# Patient Record
Sex: FEMALE | Race: BLACK OR AFRICAN AMERICAN | NOT HISPANIC OR LATINO | Employment: FULL TIME | URBAN - METROPOLITAN AREA
[De-identification: names, ages, dates, MRNs, and addresses within clinical notes are randomized per-mention and may not be internally consistent; named-entity substitution may affect disease eponyms.]

---

## 2019-10-15 ENCOUNTER — HOSPITAL ENCOUNTER (EMERGENCY)
Facility: HOSPITAL | Age: 54
Discharge: HOME | End: 2019-10-15
Attending: EMERGENCY MEDICINE
Payer: COMMERCIAL

## 2019-10-15 VITALS
TEMPERATURE: 98 F | HEART RATE: 98 BPM | SYSTOLIC BLOOD PRESSURE: 154 MMHG | WEIGHT: 143 LBS | HEIGHT: 63 IN | OXYGEN SATURATION: 98 % | DIASTOLIC BLOOD PRESSURE: 99 MMHG | BODY MASS INDEX: 25.34 KG/M2 | RESPIRATION RATE: 18 BRPM
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
## 2019-10-15 DIAGNOSIS — M25.512 ACUTE PAIN OF LEFT SHOULDER: ICD-10-CM

## 2019-10-15 LAB
B-HCG UR QL: NEGATIVE
BILIRUB UR QL STRIP: NEGATIVE
CLARITY UR: CLEAR
COLOR UR: YELLOW
CTP QC/QA: YES
GLUCOSE UR QL STRIP: NEGATIVE
HGB UR QL STRIP: ABNORMAL
KETONES UR QL STRIP: NEGATIVE
LEUKOCYTE ESTERASE UR QL STRIP: NEGATIVE
NITRITE UR QL STRIP: NEGATIVE
PH UR STRIP: 6 [PH] (ref 5–8)
PROT UR QL STRIP: NEGATIVE
SP GR UR STRIP: <=1.005 (ref 1–1.03)
URN SPEC COLLECT METH UR: ABNORMAL
UROBILINOGEN UR STRIP-ACNC: NEGATIVE EU/DL

## 2019-10-15 PROCEDURE — 25000003 PHARM REV CODE 250: Performed by: EMERGENCY MEDICINE

## 2019-10-15 PROCEDURE — 81003 URINALYSIS AUTO W/O SCOPE: CPT

## 2019-10-15 PROCEDURE — 81025 URINE PREGNANCY TEST: CPT | Performed by: EMERGENCY MEDICINE

## 2019-10-15 PROCEDURE — 99284 EMERGENCY DEPT VISIT MOD MDM: CPT | Mod: 25

## 2019-10-15 RX ORDER — ACETAMINOPHEN 325 MG/1
650 TABLET ORAL
Status: COMPLETED | OUTPATIENT
Start: 2019-10-15 | End: 2019-10-15

## 2019-10-15 RX ORDER — TRAMADOL HYDROCHLORIDE 50 MG/1
50 TABLET ORAL EVERY 8 HOURS PRN
Qty: 12 TABLET | Refills: 0 | Status: SHIPPED | OUTPATIENT
Start: 2019-10-15 | End: 2020-06-15

## 2019-10-15 RX ORDER — CYCLOBENZAPRINE HCL 10 MG
10 TABLET ORAL EVERY 8 HOURS PRN
Qty: 14 TABLET | Refills: 0 | Status: SHIPPED | OUTPATIENT
Start: 2019-10-15 | End: 2019-10-20

## 2019-10-15 RX ORDER — PROCHLORPERAZINE MALEATE 10 MG
10 TABLET ORAL EVERY 8 HOURS PRN
Qty: 14 TABLET | Refills: 0 | Status: SHIPPED | OUTPATIENT
Start: 2019-10-15 | End: 2020-06-15

## 2019-10-15 RX ADMIN — ACETAMINOPHEN 650 MG: 325 TABLET ORAL at 10:10
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
## 2019-10-16 NOTE — ED NOTES
Pt educated on discharge instructions, follow up care, and medications. Pt verbalized understanding.
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
## 2020-01-02 ENCOUNTER — HOSPITAL ENCOUNTER (EMERGENCY)
Facility: HOSPITAL | Age: 55
Discharge: HOME | End: 2020-01-02
Attending: EMERGENCY MEDICINE
Payer: COMMERCIAL

## 2020-01-02 VITALS
HEIGHT: 63 IN | DIASTOLIC BLOOD PRESSURE: 88 MMHG | WEIGHT: 153 LBS | TEMPERATURE: 98 F | OXYGEN SATURATION: 98 % | BODY MASS INDEX: 27.11 KG/M2 | SYSTOLIC BLOOD PRESSURE: 132 MMHG | HEART RATE: 82 BPM | RESPIRATION RATE: 20 BRPM

## 2020-01-02 DIAGNOSIS — R00.2 PALPITATIONS: ICD-10-CM

## 2020-01-02 DIAGNOSIS — G43.909 MIGRAINE WITHOUT STATUS MIGRAINOSUS, NOT INTRACTABLE, UNSPECIFIED MIGRAINE TYPE: Primary | ICD-10-CM

## 2020-01-02 LAB
ALBUMIN SERPL BCP-MCNC: 3.8 G/DL (ref 3.5–5.2)
ALP SERPL-CCNC: 47 U/L (ref 55–135)
ALT SERPL W/O P-5'-P-CCNC: 22 U/L (ref 10–44)
ANION GAP SERPL CALC-SCNC: 13 MMOL/L (ref 8–16)
AST SERPL-CCNC: 22 U/L (ref 10–40)
B-HCG UR QL: NEGATIVE
BASOPHILS # BLD AUTO: 0.03 K/UL (ref 0–0.2)
BASOPHILS NFR BLD: 0.4 % (ref 0–1.9)
BILIRUB SERPL-MCNC: 0.4 MG/DL (ref 0.1–1)
BUN SERPL-MCNC: 5 MG/DL (ref 6–20)
CALCIUM SERPL-MCNC: 9.8 MG/DL (ref 8.7–10.5)
CHLORIDE SERPL-SCNC: 101 MMOL/L (ref 95–110)
CO2 SERPL-SCNC: 24 MMOL/L (ref 23–29)
CREAT SERPL-MCNC: 0.7 MG/DL (ref 0.5–1.4)
CTP QC/QA: YES
DIFFERENTIAL METHOD: NORMAL
EOSINOPHIL # BLD AUTO: 0.1 K/UL (ref 0–0.5)
EOSINOPHIL NFR BLD: 1.5 % (ref 0–8)
ERYTHROCYTE [DISTWIDTH] IN BLOOD BY AUTOMATED COUNT: 13.5 % (ref 11.5–14.5)
EST. GFR  (AFRICAN AMERICAN): >60 ML/MIN/1.73 M^2
EST. GFR  (NON AFRICAN AMERICAN): >60 ML/MIN/1.73 M^2
GLUCOSE SERPL-MCNC: 99 MG/DL (ref 70–110)
HCT VFR BLD AUTO: 41.6 % (ref 37–48.5)
HGB BLD-MCNC: 14.2 G/DL (ref 12–16)
LYMPHOCYTES # BLD AUTO: 2.5 K/UL (ref 1–4.8)
LYMPHOCYTES NFR BLD: 31.2 % (ref 18–48)
MAGNESIUM SERPL-MCNC: 2.1 MG/DL (ref 1.6–2.6)
MCH RBC QN AUTO: 29.4 PG (ref 27–31)
MCHC RBC AUTO-ENTMCNC: 34.1 G/DL (ref 32–36)
MCV RBC AUTO: 86 FL (ref 82–98)
MONOCYTES # BLD AUTO: 0.5 K/UL (ref 0.3–1)
MONOCYTES NFR BLD: 5.7 % (ref 4–15)
NEUTROPHILS # BLD AUTO: 4.8 K/UL (ref 1.8–7.7)
NEUTROPHILS NFR BLD: 61.2 % (ref 38–73)
PLATELET # BLD AUTO: 314 K/UL (ref 150–350)
PMV BLD AUTO: 9.5 FL (ref 9.2–12.9)
POTASSIUM SERPL-SCNC: 3.8 MMOL/L (ref 3.5–5.1)
PROT SERPL-MCNC: 7.9 G/DL (ref 6–8.4)
RBC # BLD AUTO: 4.83 M/UL (ref 4–5.4)
SODIUM SERPL-SCNC: 138 MMOL/L (ref 136–145)
T4 FREE SERPL-MCNC: 0.94 NG/DL (ref 0.71–1.51)
TROPONIN I SERPL DL<=0.01 NG/ML-MCNC: <0.006 NG/ML (ref 0–0.03)
TSH SERPL DL<=0.005 MIU/L-ACNC: 6.73 UIU/ML (ref 0.4–4)
WBC # BLD AUTO: 7.91 K/UL (ref 3.9–12.7)

## 2020-01-02 PROCEDURE — 83735 ASSAY OF MAGNESIUM: CPT

## 2020-01-02 PROCEDURE — 81025 URINE PREGNANCY TEST: CPT | Performed by: EMERGENCY MEDICINE

## 2020-01-02 PROCEDURE — 96375 TX/PRO/DX INJ NEW DRUG ADDON: CPT

## 2020-01-02 PROCEDURE — 84439 ASSAY OF FREE THYROXINE: CPT

## 2020-01-02 PROCEDURE — 63600175 PHARM REV CODE 636 W HCPCS: Performed by: EMERGENCY MEDICINE

## 2020-01-02 PROCEDURE — 93005 ELECTROCARDIOGRAM TRACING: CPT

## 2020-01-02 PROCEDURE — 80053 COMPREHEN METABOLIC PANEL: CPT

## 2020-01-02 PROCEDURE — 96361 HYDRATE IV INFUSION ADD-ON: CPT

## 2020-01-02 PROCEDURE — 93010 ELECTROCARDIOGRAM REPORT: CPT | Mod: ,,, | Performed by: INTERNAL MEDICINE

## 2020-01-02 PROCEDURE — 99285 EMERGENCY DEPT VISIT HI MDM: CPT | Mod: 25

## 2020-01-02 PROCEDURE — 84443 ASSAY THYROID STIM HORMONE: CPT

## 2020-01-02 PROCEDURE — 93010 EKG 12-LEAD: ICD-10-PCS | Mod: ,,, | Performed by: INTERNAL MEDICINE

## 2020-01-02 PROCEDURE — 85025 COMPLETE CBC W/AUTO DIFF WBC: CPT

## 2020-01-02 PROCEDURE — 84484 ASSAY OF TROPONIN QUANT: CPT

## 2020-01-02 PROCEDURE — 96374 THER/PROPH/DIAG INJ IV PUSH: CPT

## 2020-01-02 RX ORDER — METOCLOPRAMIDE 10 MG/1
10 TABLET ORAL EVERY 6 HOURS PRN
Qty: 12 TABLET | Refills: 0 | Status: SHIPPED | OUTPATIENT
Start: 2020-01-02 | End: 2020-08-06

## 2020-01-02 RX ORDER — METOCLOPRAMIDE HYDROCHLORIDE 5 MG/ML
10 INJECTION INTRAMUSCULAR; INTRAVENOUS
Status: COMPLETED | OUTPATIENT
Start: 2020-01-02 | End: 2020-01-02

## 2020-01-02 RX ORDER — KETOROLAC TROMETHAMINE 30 MG/ML
15 INJECTION, SOLUTION INTRAMUSCULAR; INTRAVENOUS
Status: COMPLETED | OUTPATIENT
Start: 2020-01-02 | End: 2020-01-02

## 2020-01-02 RX ORDER — LEVOTHYROXINE SODIUM 125 UG/1
CAPSULE ORAL
COMMUNITY
End: 2020-08-06

## 2020-01-02 RX ORDER — MEDROXYPROGESTERONE ACETATE 10 MG/1
10 TABLET ORAL DAILY
COMMUNITY
End: 2020-08-06

## 2020-01-02 RX ORDER — MELOXICAM 7.5 MG/1
7.5 TABLET ORAL DAILY PRN
Qty: 5 TABLET | Refills: 0 | Status: SHIPPED | OUTPATIENT
Start: 2020-01-02 | End: 2020-05-14

## 2020-01-02 RX ADMIN — SODIUM CHLORIDE, SODIUM LACTATE, POTASSIUM CHLORIDE, AND CALCIUM CHLORIDE 1000 ML: .6; .31; .03; .02 INJECTION, SOLUTION INTRAVENOUS at 06:01

## 2020-01-02 RX ADMIN — METOCLOPRAMIDE 10 MG: 5 INJECTION, SOLUTION INTRAMUSCULAR; INTRAVENOUS at 06:01

## 2020-01-02 RX ADMIN — KETOROLAC TROMETHAMINE 15 MG: 30 INJECTION, SOLUTION INTRAMUSCULAR at 06:01

## 2020-01-02 NOTE — ED PROVIDER NOTES
Encounter Date: 1/2/2020    SCRIBE #1 NOTE: I, Clara Frank, am scribing for, and in the presence of,  Dr. Lyons. I have scribed the entire note.       History     Chief Complaint   Patient presents with    Palpitations     Chest palpitations that started yesterday at work around 1 pm. Palpitations have been intermittent. States she has been hospitalized in past for same symptoms.     Migraine     Migraine that start on Tuesday. Has been taking tylenol for pain. Hx of migraines with menstural cycle.      Mary Alice Garcia is a 54 y.o. female who  has a past medical history of Arthritis, Depression, Thyroid disease, and Tuberculosis.    The patient presents to the ED due to palpitations. She reports onset of symptoms was 1 day ago. The patient had a an episode of heart racing yesterday as well as this morning. This morning she was lying in bed when the symptoms began. The symptoms have been intermittent since onset. She denies any associated chest pain, leg swelling, dizziness, loss of consciousness leg swelling/pain or sweating. The patient has been evaluated in the past for palpitations years ago. However, the patient does not recall her diagnosis nor was she started on medications for arrhythmia.  In addition the patient complains of gradually worsening headache. She reports onset of symptoms was 2 day days ago. The pain is located on the left side of the head. She has associated nausea, blurred vision and light sensitivity but denies any vomiting or neck pain. The patient reports taking Tylenol for pain with minimal improvement. She typically has a Migraine when on her menstrual cycle. Pain is located to L side of head and associated with photophobia, similar to previous migraines. The patient admits she has been having vaginal bleeding since Dec. 4th. With occasional blood clots. The bleeding has improved after starting on Provera. Of note the patient is s/p thyroidectomy    The history is provided by the  patient.     Review of patient's allergies indicates:   Allergen Reactions    Paxil [paroxetine hcl] Rash     Past Medical History:   Diagnosis Date    Arthritis     Depression     Thyroid disease     Tuberculosis      Past Surgical History:   Procedure Laterality Date     SECTION      HEMORRHOID SURGERY      KNEE SURGERY      right knee    thyroid removed      TONSILLECTOMY      TUBAL LIGATION       Family History   Problem Relation Age of Onset    Cancer Mother         breast cancer    Hyperlipidemia Father     Hypertension Brother      Social History     Tobacco Use    Smoking status: Never Smoker   Substance Use Topics    Alcohol use: Yes     Alcohol/week: 3.0 standard drinks     Types: 3 Glasses of wine per week    Drug use: No     Review of Systems   Constitutional: Negative for chills and fever.   HENT: Negative for sore throat.    Respiratory: Negative for cough and shortness of breath.    Cardiovascular: Positive for palpitations. Negative for chest pain.   Gastrointestinal: Negative for nausea and vomiting.   Genitourinary: Negative for dysuria, frequency and urgency.   Musculoskeletal: Negative for back pain, neck pain and neck stiffness.   Skin: Negative for rash and wound.   Neurological: Positive for headaches. Negative for syncope and weakness.   Hematological: Does not bruise/bleed easily.   Psychiatric/Behavioral: Negative for agitation, behavioral problems and confusion.       Physical Exam     Initial Vitals [20 0540]   BP Pulse Resp Temp SpO2   (!) 160/82 89 18 98.1 °F (36.7 °C) 100 %      MAP       --         Physical Exam    Nursing note and vitals reviewed.  Constitutional: She appears well-developed and well-nourished. She is not diaphoretic. No distress.   HENT:   Head: Normocephalic and atraumatic.   Mouth/Throat: Oropharynx is clear and moist.   Eyes: Conjunctivae and EOM are normal. Pupils are equal, round, and reactive to light.   No visual field deficits    Neck: Normal range of motion. Neck supple.   No neck pain or stiffness; FROM   Cardiovascular: Normal rate, regular rhythm and normal heart sounds. Exam reveals no gallop and no friction rub.    No murmur heard.  Pulmonary/Chest: Breath sounds normal. She has no wheezes. She has no rhonchi. She has no rales.   Musculoskeletal: Normal range of motion. She exhibits no edema.   Lymphadenopathy:     She has no cervical adenopathy.   Neurological: She is alert and oriented to person, place, and time. She has normal strength.   CN 2-12 intact  Finger to nose within normal limits  Negative romberg  Gait normal  Equal strength to bilateral upper extremities, SILT  Equal strength to bilateral lower extremities, SILT   Skin: Skin is warm and dry. No rash noted.         ED Course   Procedures  Labs Reviewed   COMPREHENSIVE METABOLIC PANEL - Abnormal; Notable for the following components:       Result Value    BUN, Bld 5 (*)     Alkaline Phosphatase 47 (*)     All other components within normal limits   TSH - Abnormal; Notable for the following components:    TSH 6.734 (*)     All other components within normal limits   CBC W/ AUTO DIFFERENTIAL   TROPONIN I   MAGNESIUM   T4, FREE   POCT URINE PREGNANCY          Imaging Results          X-Ray Chest AP Portable (Final result)  Result time 01/02/20 07:00:20    Final result by Raquel Mena MD (01/02/20 07:00:20)                 Impression:      No acute intrathoracic abnormality identified on this single radiographic view of the chest.      Electronically signed by: Raquel Mena MD  Date:    01/02/2020  Time:    07:00             Narrative:    EXAMINATION:  XR CHEST AP PORTABLE    CLINICAL HISTORY:  palpitations;    TECHNIQUE:  Single frontal view of the chest was performed.    COMPARISON:  02/09/2015    FINDINGS:  Cardiac monitoring leads overlie the chest.  The visualized airway is unremarkable.  The lungs appear symmetrically aerated without definite focal alveolar  consolidation. No large pleural effusion or pneumothorax is appreciated.Visualized osseous structures are intact.                                 Medical Decision Making:   Differential Diagnosis:   Differential Diagnosis includes, but is not limited to:  Ischemic stroke, hemorrhagic stroke, subarachnoid hemorrhage/ruptured aneurysm, intracranial lesion/mass, meningitis/encephalitis, epidural hematoma, subdural hematoma, pseudotumor cerebri, venous sinus thrombosis, CO poisoning, hypertensive encephalopathy, MI/ACS, head trauma/contusion, concussion, sinus headache, dehydration, anxiety, medication non-compliance, primary headache (tension/cluster/migraine).  Arrhythmia, electrolyte abnormality, thyroid disorder, medication effect, ACS    Clinical Tests:   Lab Tests: Ordered and Reviewed  Radiological Study: Ordered and Reviewed  Medical Tests: Ordered and Reviewed  ED Management:  Patient's labs without significant abnormality.    EKG sinus rhythm.  Patient was observed on cardiac monitoring without evidence of dysrhythmia.    Will refer to cardiology.    Headache improved after headache cocktail.  Will discharge with Mobic and Reglan.    Return precautions were given.  Patient felt better on reassessment and is comfortable with discharge.    After taking into careful account the historical factors and physical exam findings of the patient's presentation today, in conjunction with the empirical and objective data obtained on ED workup, no acute emergent medical condition has been identified. The patient appears to be low risk for an emergent medical condition and I feel it is safe and appropriate at this time for the patient to be discharged to follow-up as detailed in their discharge instructions for reevaluation and possible continued outpatient workup and management. I have discussed the specifics of the workup with the patient and the patient has verbalized understanding of the details of the workup, the  diagnosis, the treatment plan, and the need for outpatient follow-up.  Although the patient has no emergent etiology today this does not preclude the development of an emergent condition so in addition, I have advised the patient that they can return to the ED and/or activate EMS at any time with worsening of their symptoms, change of their symptoms, or with any other medical complaint.  The patient remained comfortable and stable during their visit in the ED.  Discharge and follow-up instructions discussed with the patient who expressed understanding and willingness to comply with my recommendations.                     ED Course as of Jan 02 0819   Thu Jan 02, 2020   0603 ECG: rate 89 Normal sinus rhythm. No STEMI. No Ectopy.     [RN]   0620 Patient presents with palpitations and headache. She reports similar pattern of headache with her menstrual period.  She reports having been admitted many years ago in [place] for palpitations however does not remember her diagnosis or take any anti rhythmic medications.  On exam she has a nonfocal neuro exam her heart beat is regular will obtain labs treat symptoms and will reassess.    [RN]   0658 CBC auto differential [RN]   0658 Troponin I [RN]   0752 Patient's pain is improved.  She feels comfortable going home.  Labs reviewed no significant abnormalities.  Will refer to PCP cardiology palpitations become worse.  Return precautions for dizziness weakness loss of consciousness worsening headache numbness weakness vision changes or any other concerns.    [RN]      ED Course User Index  [RN] Deng Lyons Jr., MD                Clinical Impression:     1. Migraine without status migrainosus, not intractable, unspecified migraine type    2. Palpitations        Disposition:   Disposition: Discharged  Condition: Stable    Scribe attestation I, Deng Lyons,  personally performed the services described in this documentation. All medical record entries made by the scribe were  at my direction and in my presence.  I have reviewed the chart and agree that the record reflects my personal performance and is accurate and complete. Deng Lyons M.D. 8:23 AM01/02/2020    Portions of this note were dictated using voice recognition software and may contain dictation related errors in spelling/grammar/syntax not found on text review                   Deng Lyons Jr., MD  01/02/20 0811

## 2020-01-02 NOTE — PROVIDER PROGRESS NOTES - EMERGENCY DEPT.
Encounter Date: 1/2/2020    ED Physician Progress Notes         EKG - STEMI Decision  Initial Reading: No STEMI present.  Response: No Action Needed.     EKG:  Normal sinus rhythm at 89 bpm, nl axis, nl intervals, no hypertrophy, no ST-T changes as read by me (Dr. Cordoba).  No previous EKG for comparison impression normal.

## 2020-01-02 NOTE — ED NOTES
Patient stated HA pain was now an 8/10 from a 10. Fluids complete. Nurse let her know new nurse would be in soon for introduction. Still waiting for xray to result.

## 2020-01-02 NOTE — ED NOTES
Assumed care of this patient. Pt is resting on stretcher with eyes closed. SR up and CB in reach. No distress noted. Will continue to monitor.

## 2020-01-02 NOTE — ED TRIAGE NOTES
Patient presents to the ED with complaints of a left sided migraine that started on Tuesday and chest palpitations that started yesterday at 1 pm while at work. Patient has history of both. States migraines usually occur when she is on her period although she has been bleeding since beginning of December. Migraines always on left side. She has been to GYN and was started on hormone medication to help with bleeding. Bleeding let up on Tuesday but did become heavy again on Wednesday. Has not bled through a pad in less than an hour. She took tylenol last at midnight with minimal relief. Patient teary eyed. States pain 10/10 with photophobia. Chest palpitations occurred before coming to ER. Resolved at this point.     Review of patient's allergies indicates:   Allergen Reactions    Paxil [paroxetine hcl] Rash        Patient has verified the spelling of their name and  on armband.   APPEARANCE: Patient is alert, calm, oriented x 4, and teary eyed. Squinting eyes   SKIN: Skin is normal for race, warm, and dry. Normal skin turgor and mucous membranes moist.  CARDIAC: Normal rate and rhythm, no murmur heard. Denies chest pain or palpitations  RESPIRATORY:Normal rate and effort. Breath sounds clear bilaterally throughout chest. Respirations are equal and unlabored.    GASTRO: Bowel sounds normal, abdomen is soft, no tenderness, and no abdominal distention.  MUSCLE: Full ROM. No bony tenderness or soft tissue tenderness. No obvious deformity.  PERIPHERAL VASCULAR: peripheral pulses present. Normal cap refill. No edema. Warm to touch.  NEURO: 5/5 strength major flexors/extensors bilaterally. Sensory intact to light touch bilaterally. Sarah coma scale: eyes open spontaneously-4, oriented & converses-5, obeys commands-6. No neurological abnormalities.   MENTAL STATUS: awake, alert and aware of environment.  EYE: No overt deficits noted. No drainage. Sclera WNL +photophobia due to migraine  ENT: EARS: no obvious drainage.  NOSE: no active bleeding. THROAT: no redness or swelling.  : Voids without complication +vaginal bleeding which has been present since beginning of Dec. Has not bled through a pad in less than an hour   HEAD: left frontal/parietal lobe HA rated 10/10 on pain scale. Patient states her migraines normally reside on that side

## 2020-02-18 ENCOUNTER — TELEPHONE (OUTPATIENT)
Dept: SURGERY | Facility: CLINIC | Age: 55
End: 2020-02-18

## 2020-02-18 NOTE — TELEPHONE ENCOUNTER
----- Message from Meli Delacruz sent at 2/18/2020  3:52 PM CST -----  Contact: [phone]-self  Patient is requesting a call back concerning pt called yesterday to cancel her appt because the Dr wanted her to at least have six weeks of the medication before she sees the Dr, pt called because the appt was not canceled. Please call    2/18/20  4:09pm  Spoke to patient regarding above message. All questions answered. Patient verbalized understanding.

## 2020-04-07 ENCOUNTER — TELEPHONE (OUTPATIENT)
Dept: ORTHOPEDICS | Facility: CLINIC | Age: 55
End: 2020-04-07

## 2020-04-07 NOTE — TELEPHONE ENCOUNTER
----- Message from Zoila Scanlon sent at 4/7/2020 11:07 AM CDT -----  Contact: patient  Type:  Same Day Appointment Request    Caller is requesting a same day appointment.  Caller declined first available appointment listed below.    Name of Caller: Mary Alice  When is the first available appointment?unknown  Symptoms:Shoulder pain  Best Call Back Number:[phone]  Additional Information: Patient is complaining of shoulder pain and would like to be seen as soon as something is available

## 2020-04-20 ENCOUNTER — TELEPHONE (OUTPATIENT)
Dept: ORTHOPEDICS | Facility: CLINIC | Age: 55
End: 2020-04-20

## 2020-04-20 NOTE — TELEPHONE ENCOUNTER
----- Message from Deng Lutz Jr., MD sent at 4/20/2020  8:56 AM CDT -----  Contact: Patient   Needs appt please  ----- Message -----  From: Roshni Dixon  Sent: 4/13/2020   9:24 AM CDT  To: Deng Lutz Jr., MD    Name of caller: Mary Alice Garcia  Reason for Visit/Symptoms: problem with shoulder  Best contact # or Confirm MyChart preferred: [phone]  Preferred date/time of Appt. Yes time  Interested in Virtual visit? No  Added Info: Left messages for office and hasn't received a call back.

## 2020-05-11 ENCOUNTER — TELEPHONE (OUTPATIENT)
Dept: ORTHOPEDICS | Facility: CLINIC | Age: 55
End: 2020-05-11

## 2020-05-11 NOTE — TELEPHONE ENCOUNTER
----- Message from Joselin Lo sent at 5/11/2020  9:17 AM CDT -----  Contact: Self [phone]  Patient would like to speak with you about not canceling her appointment and needs to be seen today for shoulder pain. Please advise

## 2020-05-13 ENCOUNTER — TELEPHONE (OUTPATIENT)
Dept: ORTHOPEDICS | Facility: CLINIC | Age: 55
End: 2020-05-13

## 2020-05-13 DIAGNOSIS — M25.512 LEFT SHOULDER PAIN, UNSPECIFIED CHRONICITY: Primary | ICD-10-CM

## 2020-05-14 ENCOUNTER — OFFICE VISIT (OUTPATIENT)
Dept: ORTHOPEDICS | Facility: CLINIC | Age: 55
End: 2020-05-14
Payer: COMMERCIAL

## 2020-05-14 ENCOUNTER — HOSPITAL ENCOUNTER (OUTPATIENT)
Dept: RADIOLOGY | Facility: HOSPITAL | Age: 55
Discharge: HOME | End: 2020-05-14
Attending: ORTHOPAEDIC SURGERY
Payer: COMMERCIAL

## 2020-05-14 VITALS — HEIGHT: 63 IN | BODY MASS INDEX: 27.11 KG/M2 | WEIGHT: 153 LBS

## 2020-05-14 DIAGNOSIS — M25.512 LEFT SHOULDER PAIN, UNSPECIFIED CHRONICITY: ICD-10-CM

## 2020-05-14 DIAGNOSIS — S46.012A TRAUMATIC INCOMPLETE TEAR OF LEFT ROTATOR CUFF, INITIAL ENCOUNTER: Primary | ICD-10-CM

## 2020-05-14 PROCEDURE — 73030 X-RAY EXAM OF SHOULDER: CPT | Mod: 26,LT,, | Performed by: RADIOLOGY

## 2020-05-14 PROCEDURE — 99999 PR PBB SHADOW E&M-EST. PATIENT-LVL II: ICD-10-PCS | Mod: PBBFAC,,, | Performed by: ORTHOPAEDIC SURGERY

## 2020-05-14 PROCEDURE — 99203 PR OFFICE/OUTPT VISIT, NEW, LEVL III, 30-44 MIN: ICD-10-PCS | Mod: S$GLB,,, | Performed by: ORTHOPAEDIC SURGERY

## 2020-05-14 PROCEDURE — 99999 PR PBB SHADOW E&M-EST. PATIENT-LVL II: CPT | Mod: PBBFAC,,, | Performed by: ORTHOPAEDIC SURGERY

## 2020-05-14 PROCEDURE — 99203 OFFICE O/P NEW LOW 30 MIN: CPT | Mod: S$GLB,,, | Performed by: ORTHOPAEDIC SURGERY

## 2020-05-14 PROCEDURE — 73030 X-RAY EXAM OF SHOULDER: CPT | Mod: TC,PN,LT

## 2020-05-14 PROCEDURE — 3008F PR BODY MASS INDEX (BMI) DOCUMENTED: ICD-10-PCS | Mod: CPTII,S$GLB,, | Performed by: ORTHOPAEDIC SURGERY

## 2020-05-14 PROCEDURE — 3008F BODY MASS INDEX DOCD: CPT | Mod: CPTII,S$GLB,, | Performed by: ORTHOPAEDIC SURGERY

## 2020-05-14 PROCEDURE — 73030 XR SHOULDER COMPLETE 2 OR MORE VIEWS LEFT: ICD-10-PCS | Mod: 26,LT,, | Performed by: RADIOLOGY

## 2020-05-14 RX ORDER — NABUMETONE 750 MG/1
750 TABLET, FILM COATED ORAL 2 TIMES DAILY WITH MEALS
Qty: 60 TABLET | Refills: 1 | Status: SHIPPED | OUTPATIENT
Start: 2020-05-14 | End: 2020-06-15
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
## 2020-05-22 ENCOUNTER — TELEPHONE (OUTPATIENT)
Dept: ORTHOPEDICS | Facility: CLINIC | Age: 55
End: 2020-05-22

## 2020-05-22 NOTE — TELEPHONE ENCOUNTER
----- Message from Elisabeth Mclean sent at 5/22/2020  2:28 PM CDT -----  Contact: [phone]/self  Patient is requesting a call back regarding the MRI is too expensive. Please call her to discuss changing to a location that her insurance will cover fully. Thanks

## 2020-05-22 NOTE — TELEPHONE ENCOUNTER
----- Message from Shay Torres sent at 5/22/2020 11:41 AM CDT -----  Contact: Patient  Patient called in and wanted to speak with the office regarding her upcoming appointment for her MRI. She would like a call back from the office and can be reached at    [phone]

## 2020-05-29 ENCOUNTER — TELEPHONE (OUTPATIENT)
Dept: ENDOCRINOLOGY | Facility: CLINIC | Age: 55
End: 2020-05-29

## 2020-06-01 ENCOUNTER — OFFICE VISIT (OUTPATIENT)
Dept: ENDOCRINOLOGY | Facility: CLINIC | Age: 55
End: 2020-06-01
Payer: COMMERCIAL

## 2020-06-01 VITALS
SYSTOLIC BLOOD PRESSURE: 130 MMHG | BODY MASS INDEX: 28.12 KG/M2 | DIASTOLIC BLOOD PRESSURE: 70 MMHG | HEIGHT: 63 IN | WEIGHT: 158.69 LBS

## 2020-06-01 DIAGNOSIS — E66.3 OVERWEIGHT (BMI 25.0-29.9): ICD-10-CM

## 2020-06-01 DIAGNOSIS — K21.9 GASTROESOPHAGEAL REFLUX DISEASE, ESOPHAGITIS PRESENCE NOT SPECIFIED: ICD-10-CM

## 2020-06-01 DIAGNOSIS — E03.9 HYPOTHYROIDISM, UNSPECIFIED TYPE: ICD-10-CM

## 2020-06-01 PROCEDURE — 99204 PR OFFICE/OUTPT VISIT, NEW, LEVL IV, 45-59 MIN: ICD-10-PCS | Mod: S$GLB,,, | Performed by: INTERNAL MEDICINE

## 2020-06-01 PROCEDURE — 3075F SYST BP GE 130 - 139MM HG: CPT | Mod: CPTII,S$GLB,, | Performed by: INTERNAL MEDICINE

## 2020-06-01 PROCEDURE — 3008F BODY MASS INDEX DOCD: CPT | Mod: CPTII,S$GLB,, | Performed by: INTERNAL MEDICINE

## 2020-06-01 PROCEDURE — 3008F PR BODY MASS INDEX (BMI) DOCUMENTED: ICD-10-PCS | Mod: CPTII,S$GLB,, | Performed by: INTERNAL MEDICINE

## 2020-06-01 PROCEDURE — 3075F PR MOST RECENT SYSTOLIC BLOOD PRESS GE 130-139MM HG: ICD-10-PCS | Mod: CPTII,S$GLB,, | Performed by: INTERNAL MEDICINE

## 2020-06-01 PROCEDURE — 3078F PR MOST RECENT DIASTOLIC BLOOD PRESSURE < 80 MM HG: ICD-10-PCS | Mod: CPTII,S$GLB,, | Performed by: INTERNAL MEDICINE

## 2020-06-01 PROCEDURE — 99999 PR PBB SHADOW E&M-EST. PATIENT-LVL III: ICD-10-PCS | Mod: PBBFAC,,, | Performed by: INTERNAL MEDICINE

## 2020-06-01 PROCEDURE — 99999 PR PBB SHADOW E&M-EST. PATIENT-LVL III: CPT | Mod: PBBFAC,,, | Performed by: INTERNAL MEDICINE

## 2020-06-01 PROCEDURE — 3078F DIAST BP <80 MM HG: CPT | Mod: CPTII,S$GLB,, | Performed by: INTERNAL MEDICINE

## 2020-06-01 PROCEDURE — 99204 OFFICE O/P NEW MOD 45 MIN: CPT | Mod: S$GLB,,, | Performed by: INTERNAL MEDICINE

## 2020-06-01 NOTE — PROGRESS NOTES
ENDOCRINOLOGY INITIAL VISIT  06/01/2020    Reason for Visit:  Mary Alice Garcia is a 55 y.o. 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      With regards to her hypothyroidism:    was found a thyroid nodule in 2007 and had growth of nodule so underwent total thyroidectomy in 2009 at [facility], reportedly benign pathology.  She has been on levothyroxine replacement since then but reports fluctuating TSH despite taking levothyroxine appropriately.    PGM had thyroid problems requiring thyroidectomy, unknown problem.      Etiology determine to be: postsurgical      Labs:  Lab Results   Component Value Date    TSH 6.734 (H) 01/02/2020    TSH 2.511 02/05/2015    TSH 2.176 05/29/2013    FREET4 0.94 01/02/2020    FREET4 1.31 05/29/2013    FREET4 0.99 04/22/2013       Current medication:  generic lt4 112 mcg daily (reduced from 125 mcg 2 mo ago).  In the past was on 200 mcg has been gradually reduced.  On 10/2019 was changed from levothyroxine to unithroid due to fluctuations in TSH but she reports she had increased menstrual bleeding on this formulation which resolved when she switched back to levothyroxine.      Takes thyroid medication properly without food first thing in the morning at 3 am (with alarm), wakes at 5 am and eats.  Takes other blood pressure med at 9 am at the end of her am break at work ([employer]).  Takes Ca at least 4 hours after LT4.      Thyroid Symptoms  + fatigue since thyroidectomy    Weight change:  [x]  Gain []  Loss  []  Denies    up 10 lbs in past few months  Has been cutting back on starches  Active at work, walking all day at [facility] [employer] [facility] [employer]    Temperature intolerance:  [x]  Cold (chronic) []  Hot   []  Denies      GI:  []  Diarrhea [x]  Constipation []  Denies    Integument:  [x]  Hair loss []  Dry skin  []  Denies    Other:  [x]  Palpitation w/ exertion []  Tremor       [x]  Increased anxiety    []  Denies      Past Medical History:   Diagnosis Date    Arthritis     Depression     Thyroid disease     Tuberculosis        Past Surgical History:   Procedure Laterality Date     SECTION      HEMORRHOID SURGERY      KNEE SURGERY      right knee    thyroid removed      TONSILLECTOMY      TUBAL LIGATION         Review of patient's allergies indicates:   Allergen Reactions    Paxil [paroxetine hcl] Rash         Current Outpatient Medications:     acyclovir (ZOVIRAX) 800 MG Tab, Take 1 tablet (800 mg total) by mouth 2 (two) times daily., Disp: 14 tablet, Rfl: 1    calcium carbonate (OS-FLO) 500 mg calcium (1,250 mg) tablet, Take 2 tablets by mouth 2 (two) times daily.  , Disp: , Rfl:     fish oil-omega-3 fatty acids 300-1,000 mg capsule, Take 2 g by mouth once daily., Disp: , Rfl:     hydrOXYzine (ATARAX) 50 MG tablet, Take 1 tablet (50 mg total) by mouth nightly as needed for Itching. (Patient not taking: Reported on 2020), Disp: 90 tablet, Rfl: 0    levothyroxine 125 mcg Cap, Take by mouth., Disp: , Rfl:     medroxyPROGESTERone (PROVERA) 10 MG tablet, Take 10 mg by mouth once daily., Disp: , Rfl:     metoclopramide HCl (REGLAN) 10 MG tablet, Take 1 tablet (10 mg total) by mouth every 6 (six) hours as needed. (Patient not taking: Reported on 2020), Disp: 12 tablet, Rfl: 0    nabumetone (RELAFEN) 750 MG tablet, Take 1 tablet (750 mg total) by mouth 2 (two) times daily with meals., Disp: 60 tablet, Rfl: 1    prochlorperazine (COMPAZINE) 10 MG tablet, Take 1 tablet (10 mg total) by mouth every 8 (eight) hours as needed (Nausea). (Patient not taking: Reported on 2020), Disp: 14 tablet, Rfl: 0    ranitidine (ZANTAC) 150 MG tablet, Take 1 tablet (150 mg total) by mouth 2 (two) times daily., Disp: 60 tablet, Rfl: 0    traMADol (ULTRAM) 50 mg tablet, Take 1 tablet (50 mg total) by mouth every 8 (eight) hours as needed for Pain. (Patient not taking:  "Reported on 5/14/2020), Disp: 12 tablet, Rfl: 0    triamterene-hydrochlorothiazide 37.5-25 mg (MAXZIDE-25) 37.5-25 mg per tablet, Take 1 tablet by mouth once daily., Disp: 90 tablet, Rfl: 3    Current Facility-Administered Medications:     dexamethasone injection 4 mg, 4 mg, Other, 1 time in Clinic/HOD, Cecily Huang MD    Social History     Socioeconomic History    Marital status:      Spouse name: Not on file    Number of children: Not on file    Years of education: Not on file    Highest education level: Not on file   Occupational History    Not on file   Social Needs    Financial resource strain: Not on file    Food insecurity:     Worry: Not on file     Inability: Not on file    Transportation needs:     Medical: Not on file     Non-medical: Not on file   Tobacco Use    Smoking status: Never Smoker   Substance and Sexual Activity    Alcohol use: Yes     Alcohol/week: 3.0 standard drinks     Types: 3 Glasses of wine per week    Drug use: No    Sexual activity: Yes     Partners: Male   Lifestyle    Physical activity:     Days per week: Not on file     Minutes per session: Not on file    Stress: Not on file   Relationships    Social connections:     Talks on phone: Not on file     Gets together: Not on file     Attends [employer] service: Not on file     Active member of club or organization: Not on file     Attends meetings of clubs or organizations: Not on file     Relationship status: Not on file   Other Topics Concern    Not on file   Social History Narrative    Not on file       Family History   Problem Relation Age of Onset    Cancer Mother         breast cancer    Hyperlipidemia Father     Hypertension Brother        REVIEW OF SYSTEMS  A 14 point ROS was obtained with pertinent positives and negatives per HPI as well as positive for pruritic papular rash on right chest, all others negative.       PHYSICAL EXAM  /70   Ht 5' 3" (1.6 m)   Wt 72 kg (158 lb 11.2 oz)  "  BMI 28.11 kg/m²   Wt Readings from Last 3 Encounters:   06/01/20 72 kg (158 lb 11.2 oz)   05/14/20 69.4 kg (153 lb)   01/02/20 69.4 kg (153 lb)   ]    Constitutional:  Pleasant, in no acute distress.   HENT:   Head:    Normocephalic and atraumatic.   Mouth/Throat:   Oropharynx is clear and moist. No oropharyngeal exudate.   Eyes:    EOMI, No scleral icterus.   Neck:    No palpable thyroid tissue, no cervical LAD  Cardiovascular:  Normal rate, regular rhythm  Respiratory:   Effort normal and breath sounds normal.   Gastrointestinal: Soft, nontender  Neurological:  No tremor, normal speech  Skin:    Skin is warm, dry  Psych:   Normal mood and affect.   Extremity:  No edema or wounds, no deformity    LABORATORY REVIEW:  Thyroid Labs Latest Ref Rng & Units 5/29/2013 2/5/2015 1/2/2020   TSH 0.400 - 4.000 uIU/mL 2.176 2.511 6.734(H)   Free T4 0.71 - 1.51 ng/dL 1.31 - 0.94   Sodium 136 - 145 mmol/L - 137 138   Potassium 3.5 - 5.1 mmol/L - 4.4 3.8   Chloride 95 - 110 mmol/L - 103 101   Carbon Dioxide 23 - 29 mmol/L - 26 24   Glucose 70 - 110 mg/dL - 107 99   Blood Urea Nitrogen 6 - 20 mg/dL - 6 5(L)   Creatinine 0.5 - 1.4 mg/dL - 0.8 0.7   Calcium 8.7 - 10.5 mg/dL - 9.5 9.8   Total Protein 6.0 - 8.4 g/dL - 8.1 7.9   Albumin 3.5 - 5.2 g/dL - 3.6 3.8   Total Bilirubin 0.1 - 1.0 mg/dL - 0.4 0.4   AST 10 - 40 U/L - 20 22   ALT 10 - 44 U/L - 13 22   Anion Gap 8 - 16 mmol/L - 8 13   eGFR (African American) >60 mL/min/1.73 m:2 - >60.0 >60   eGFR (Non-African American) >60 mL/min/1.73 m:2 - >60.0 >60   WBC 3.90 - 12.70 K/uL - 8.82 7.91   RBC 4.00 - 5.40 M/uL - 4.87 4.83   Hemoglobin 12.0 - 16.0 g/dL - 14.1 14.2   Hematocrit 37.0 - 48.5 % - 41.1 41.6   MCV 82 - 98 fL - 84 86   MCH 27.0 - 31.0 pg - 29.0 29.4   MCHC 32.0 - 36.0 g/dL - 34.3 34.1   RDW 11.5 - 14.5 % - 13.2 13.5   Platelets 150 - 350 K/uL - 278 314   MPV 9.2 - 12.9 fL - 10.6 9.5   Gran # 1.8 - 7.7 K/uL - 5.3 4.8   Lymph # 1.0 - 4.8 K/uL - 2.8 2.5   Mono # 0.3 - 1.0  K/uL - 0.6 0.5   Eos # 0.0 - 0.5 K/uL - 0.1 0.1   Baso # 0.00 - 0.20 K/uL - 0.08 0.03   Gran % 38.0 - 73.0 % - 59.6 61.2   Lymph % 18.0 - 48.0 % - 31.2 31.2   Mono% 4.0 - 15.0 % - 6.5 5.7   Eos % 0.0 - 8.0 % - 1.6 1.5   Baso % 0.0 - 1.9 % - 0.9 0.4        ASSESSMENT/PLAN    Problem List Items Addressed This Visit        1 - High    Hypothyroid     Labs from January 2020 show elevated TSH indicative of under replacement.  At that time she was taking taking 125 mcg levothyroxine daily however her dose was decreased to 112 mcg daily which is her weight based dose.  She has been taking this dose appropriately for the past 2 months.  We discussed that the symptoms that she is having are nonspecific (some consistent with hyperthyroidism and others consistent with hypothyroidism).  Will titrate levothyroxine dose to keep TSH in the lower half of the normal range.  Will recheck TSH today to see if we need to adjust dose.  She reports history of fluctuating TSH however weight has been stable and she is taking levothyroxine as indicated and  Calcium in other meds by 4 hours.         Relevant Orders    TSH    TSH    Ambulatory referral/consult to Internal Medicine       2     GERD (gastroesophageal reflux disease)     Having acid reflux especially at night when laying down.  Currently not on any treatment but in the past was on medication which may have contributed to her fluctuating TSH.  I recommended getting a wedge to sleep on.            3     Overweight (BMI 25.0-29.9)     Discussed lifestyle modifications including improving diet with portion control and not snacking.  She will also try to increase physical activity with the goal of getting at least 150 minutes of moderate intensity aerobic activity weekly (separate from the activity she gets at work).               RTC 6 mo, labs today and again in 6 months.  Referral placed to Internal Medicine for her to reestablish care here    Anny Mills MD

## 2020-06-01 NOTE — ASSESSMENT & PLAN NOTE
Labs from January 2020 show elevated TSH indicative of under replacement.  At that time she was taking taking 125 mcg levothyroxine daily however her dose was decreased to 112 mcg daily which is her weight based dose.  She has been taking this dose appropriately for the past 2 months.  We discussed that the symptoms that she is having are nonspecific (some consistent with hyperthyroidism and others consistent with hypothyroidism).  Will titrate levothyroxine dose to keep TSH in the lower half of the normal range.  Will recheck TSH today to see if we need to adjust dose.  She reports history of fluctuating TSH however weight has been stable and she is taking levothyroxine as indicated and  Calcium in other meds by 4 hours.

## 2020-06-01 NOTE — ASSESSMENT & PLAN NOTE
Discussed lifestyle modifications including improving diet with portion control and not snacking.  She will also try to increase physical activity with the goal of getting at least 150 minutes of moderate intensity aerobic activity weekly (separate from the activity she gets at work).

## 2020-06-01 NOTE — ASSESSMENT & PLAN NOTE
Having acid reflux especially at night when laying down.  Currently not on any treatment but in the past was on medication which may have contributed to her fluctuating TSH.  I recommended getting a wedge to sleep on.

## 2020-06-01 NOTE — PATIENT INSTRUCTIONS
Thank you for enrolling in [facility]. Please follow the instructions below to securely access your online medical record. My allows you to send messages to your doctor, view your test results, renew your prescriptions, schedule appointments, and more.     How Do I Sign Up?  1. In your Internet browser, go to [email or web address].  2. In the lower right of the page, click the Activate Now link located under the Have Access Code? Title.  3. Enter your [facility] Access Code exactly as it appears below. You will not need to use this code after youve completed the sign-up process. If you do not sign up before the expiration date, you must request a new code.  [facility] Access Code: [email or web address]  Expires: 7/11/2020  9:26 AM    4. Enter Date of Birth (mm/dd/yyyy) as indicated and click the Next button. You will be taken to the next sign-up page.  5. Create a [facility] ID. This will be your new [facility] login ID and cannot be changed, so think of one that is secure and easy to remember.  6. Create a [facility] password.  Your password must be at least 8 characters long and contain at least 1 letter and 1 number.  You can change your password at any time.  7. Enter your Password Reset Question and Answer, then click the Next button.   8. Enter your e-mail address. You will receive e-mail notification when new information is available in [facility].  9. Click Sign Up. You can now view your medical record.     Additional Information  If you have questions, you can email [email or web address] or call [phone]  to talk to our [facility] staff. Remember, [facility] is NOT to be used for urgent needs. For medical emergencies, dial 911.

## 2020-06-09 ENCOUNTER — TELEPHONE (OUTPATIENT)
Dept: ORTHOPEDICS | Facility: CLINIC | Age: 55
End: 2020-06-09

## 2020-06-09 NOTE — TELEPHONE ENCOUNTER
Clinical notes faxed. Informed them that we did not cancel patients appointment. [facility] stated they need to reauthorize MRI.

## 2020-06-09 NOTE — TELEPHONE ENCOUNTER
----- Message from Alfreda Mccracken sent at 6/9/2020  9:38 AM CDT -----  Contact: [facility]  Type:  Needs Medical Advice    Who Called: [facility]  Reason:Needs authorization and clinical notes  Would the patient rather a call back or a response via [facility]? callback  Best Call Back Number: [phone] ext [phone]   Fax: [phone]  Additional Information: none

## 2020-06-16 ENCOUNTER — TELEPHONE (OUTPATIENT)
Dept: ORTHOPEDICS | Facility: CLINIC | Age: 55
End: 2020-06-16

## 2020-06-16 NOTE — TELEPHONE ENCOUNTER
----- Message from Amada Salcido sent at 6/16/2020  1:57 PM CDT -----  Regarding: MRI Results  Type:  Received MRI Results      Name of Caller   patient  need to speak with nurse want to know if doctor received results of MRI  When is the first available appointment?  Symptoms:  Best Call Back Number:[phone]  Additional Information:

## 2020-08-06 ENCOUNTER — OFFICE VISIT (OUTPATIENT)
Dept: CARDIOLOGY | Facility: CLINIC | Age: 55
End: 2020-08-06
Payer: COMMERCIAL

## 2020-08-06 VITALS
HEIGHT: 63 IN | DIASTOLIC BLOOD PRESSURE: 100 MMHG | BODY MASS INDEX: 27.8 KG/M2 | SYSTOLIC BLOOD PRESSURE: 136 MMHG | WEIGHT: 156.88 LBS | HEART RATE: 102 BPM

## 2020-08-06 DIAGNOSIS — I10 ESSENTIAL HYPERTENSION: ICD-10-CM

## 2020-08-06 DIAGNOSIS — E03.9 ACQUIRED HYPOTHYROIDISM: Primary | ICD-10-CM

## 2020-08-06 DIAGNOSIS — R07.9 CHEST PAIN OF UNCERTAIN ETIOLOGY: ICD-10-CM

## 2020-08-06 DIAGNOSIS — R00.0 SINUS TACHYCARDIA: ICD-10-CM

## 2020-08-06 DIAGNOSIS — R06.09 DYSPNEA ON EXERTION: ICD-10-CM

## 2020-08-06 DIAGNOSIS — R00.2 PALPITATIONS: ICD-10-CM

## 2020-08-06 PROCEDURE — 99205 PR OFFICE/OUTPT VISIT, NEW, LEVL V, 60-74 MIN: ICD-10-PCS | Mod: S$GLB,,, | Performed by: INTERNAL MEDICINE

## 2020-08-06 PROCEDURE — 3008F PR BODY MASS INDEX (BMI) DOCUMENTED: ICD-10-PCS | Mod: CPTII,S$GLB,, | Performed by: INTERNAL MEDICINE

## 2020-08-06 PROCEDURE — 99205 OFFICE O/P NEW HI 60 MIN: CPT | Mod: S$GLB,,, | Performed by: INTERNAL MEDICINE

## 2020-08-06 PROCEDURE — 3075F PR MOST RECENT SYSTOLIC BLOOD PRESS GE 130-139MM HG: ICD-10-PCS | Mod: CPTII,S$GLB,, | Performed by: INTERNAL MEDICINE

## 2020-08-06 PROCEDURE — 93000 EKG 12-LEAD: ICD-10-PCS | Mod: S$GLB,,, | Performed by: INTERNAL MEDICINE

## 2020-08-06 PROCEDURE — 3078F PR MOST RECENT DIASTOLIC BLOOD PRESSURE < 80 MM HG: ICD-10-PCS | Mod: CPTII,S$GLB,, | Performed by: INTERNAL MEDICINE

## 2020-08-06 PROCEDURE — 3008F BODY MASS INDEX DOCD: CPT | Mod: CPTII,S$GLB,, | Performed by: INTERNAL MEDICINE

## 2020-08-06 PROCEDURE — 99999 PR PBB SHADOW E&M-EST. PATIENT-LVL III: ICD-10-PCS | Mod: PBBFAC,,, | Performed by: INTERNAL MEDICINE

## 2020-08-06 PROCEDURE — 93000 ELECTROCARDIOGRAM COMPLETE: CPT | Mod: S$GLB,,, | Performed by: INTERNAL MEDICINE

## 2020-08-06 PROCEDURE — 99999 PR PBB SHADOW E&M-EST. PATIENT-LVL III: CPT | Mod: PBBFAC,,, | Performed by: INTERNAL MEDICINE

## 2020-08-06 PROCEDURE — 3075F SYST BP GE 130 - 139MM HG: CPT | Mod: CPTII,S$GLB,, | Performed by: INTERNAL MEDICINE

## 2020-08-06 PROCEDURE — 3078F DIAST BP <80 MM HG: CPT | Mod: CPTII,S$GLB,, | Performed by: INTERNAL MEDICINE

## 2020-08-06 RX ORDER — DOCUSATE SODIUM 100 MG/1
100 CAPSULE, LIQUID FILLED ORAL DAILY
COMMUNITY
End: 2023-07-11

## 2020-08-06 RX ORDER — LEVOTHYROXINE SODIUM 112 UG/1
TABLET ORAL
COMMUNITY
Start: 2020-07-31 | End: 2020-12-22

## 2020-08-06 RX ORDER — CHOLECALCIFEROL (VITAMIN D3) 125 MCG
5000 CAPSULE ORAL DAILY
COMMUNITY
End: 2024-02-26

## 2020-08-06 RX ORDER — LOSARTAN POTASSIUM 25 MG/1
25 TABLET ORAL DAILY
Qty: 90 TABLET | Refills: 3 | Status: SHIPPED | OUTPATIENT
Start: 2020-08-06 | End: 2023-07-11

## 2020-08-06 RX ORDER — HYDROCHLOROTHIAZIDE 25 MG/1
25 TABLET ORAL DAILY
Qty: 90 TABLET | Refills: 3 | Status: SHIPPED | OUTPATIENT
Start: 2020-08-06 | End: 2020-10-16 | Stop reason: SDUPTHER

## 2020-08-06 RX ORDER — HYDROCHLOROTHIAZIDE 25 MG/1
25 TABLET ORAL DAILY
COMMUNITY
Start: 2020-06-23 | End: 2020-08-06 | Stop reason: SDUPTHER

## 2020-08-06 NOTE — PROGRESS NOTES
Subjective:      Patient ID: Mary Alice Garcia is a 55 y.o. female.    Chief Complaint: Hypertension (Ref by Dr Arjun Martinez), Shortness of Breath (with exertion), and Headache (for 1 1/2 weeks)    HPI:  BP on pt's machine has been elevated 142/116 and pt had been experiencing headaches for the past week and a half.    Pt  C/o pain behind her left eye.  Last Tuesday the left eye was red.    Last eye exam was about 8 months ago.    Pt works for [employer].    Pt maintains equipment    Review of Systems   Cardiovascular: Positive for chest pain (Pt had pain under her right breast for 3 hours yesterday.  Pt took 2 Tylenol.  Pt has had several episodes of chest tightness over the past few days.  ), dyspnea on exertion (when climbing stairs), palpitations (Pt feels her her heart beat fast for about a minute.  Palpitations occur twice a day for the past 7 days) and syncope (fainted a year ago while taking a passport photo--hospitalized for 3 days  at [facility] in [place].  Pt needed BP meds and potassium and thyroid meds.  Pt had a CT scan). Negative for claudication, irregular heartbeat, leg swelling, near-syncope and orthopnea.      Pt has had a stress test and a holter monitor in the past    I have migraine headaches every time I have my menses    Pt is contemplating left rotator cuff surgery      Past Medical History:   Diagnosis Date    Arthritis     Depression     Hyperlipidemia     Hypertension     2013    Syncope and collapse     Thyroid disease     Tuberculosis         Past Surgical History:   Procedure Laterality Date     SECTION      HEMORRHOID SURGERY      KNEE SURGERY      right knee    thyroid removed      TONSILLECTOMY      TUBAL LIGATION         Family History   Problem Relation Age of Onset    Cancer Mother         breast cancer    Hypertension Mother     Hyperlipidemia Father     Hypertension Brother        Social History     Socioeconomic History    Marital  status:      Spouse name: Not on file    Number of children: Not on file    Years of education: Not on file    Highest education level: Not on file   Occupational History    Not on file   Social Needs    Financial resource strain: Not on file    Food insecurity     Worry: Not on file     Inability: Not on file    Transportation needs     Medical: Not on file     Non-medical: Not on file   Tobacco Use    Smoking status: Never Smoker    Smokeless tobacco: Never Used   Substance and Sexual Activity    Alcohol use: Not Currently     Alcohol/week: 3.0 standard drinks     Types: 3 Glasses of wine per week    Drug use: No    Sexual activity: Yes     Partners: Male   Lifestyle    Physical activity     Days per week: Not on file     Minutes per session: Not on file    Stress: Not on file   Relationships    Social connections     Talks on phone: Not on file     Gets together: Not on file     Attends [employer] service: Not on file     Active member of club or organization: Not on file     Attends meetings of clubs or organizations: Not on file     Relationship status: Not on file   Other Topics Concern    Not on file   Social History Narrative    Not on file       Current Outpatient Medications on File Prior to Visit   Medication Sig Dispense Refill    calcium carbonate (OS-FLO) 500 mg calcium (1,250 mg) tablet Take 2 tablets by mouth 2 (two) times daily.        cholecalciferol, vitamin D3, 125 mcg (5,000 unit) capsule Take 5,000 Units by mouth once daily.      docusate sodium (COLACE) 100 MG capsule Take 100 mg by mouth once daily.      fish oil-omega-3 fatty acids 300-1,000 mg capsule Take 2 g by mouth once daily.      hydroCHLOROthiazide (HYDRODIURIL) 25 MG tablet Take 25 mg by mouth once daily.       levothyroxine (SYNTHROID) 112 MCG tablet       [DISCONTINUED] acyclovir (ZOVIRAX) 800 MG Tab Take 1 tablet (800 mg total) by mouth 2 (two) times daily. 14 tablet 1    [DISCONTINUED]  "levothyroxine 125 mcg Cap Take by mouth.      [DISCONTINUED] medroxyPROGESTERone (PROVERA) 10 MG tablet Take 10 mg by mouth once daily.      [DISCONTINUED] metoclopramide HCl (REGLAN) 10 MG tablet Take 1 tablet (10 mg total) by mouth every 6 (six) hours as needed. 12 tablet 0    [DISCONTINUED] triamterene-hydrochlorothiazide 37.5-25 mg (MAXZIDE-25) 37.5-25 mg per tablet Take 1 tablet by mouth once daily. 90 tablet 3     Current Facility-Administered Medications on File Prior to Visit   Medication Dose Route Frequency Provider Last Rate Last Dose    [DISCONTINUED] dexamethasone injection 4 mg  4 mg Other 1 time in Clinic/HOD Cecily Huang MD           Review of patient's allergies indicates:   Allergen Reactions    Paroxetine hcl Rash and Nausea And Vomiting     Objective:     Vitals:    08/06/20 1555 08/06/20 1626   BP: 129/88 (!) 124/94   BP Location: Left arm Left arm   Patient Position: Sitting Sitting   BP Method: Large (Automatic)    Pulse: 102    Weight: 71.2 kg (156 lb 13.7 oz)    Height: 5' 3" (1.6 m)         Physical Exam   Constitutional: She is oriented to person, place, and time. She appears well-developed and well-nourished.   Eyes: No scleral icterus.   Neck: No JVD present. Carotid bruit is not present.   Cardiovascular: Regular rhythm. Tachycardia present. Exam reveals no gallop.   Murmur (II/VI systolic) heard.  Pulses:       Dorsalis pedis pulses are 2+ on the right side and 2+ on the left side.   Pulmonary/Chest: Breath sounds normal.   Abdominal: Soft. She exhibits no abdominal bruit, no pulsatile midline mass and no mass. There is no hepatosplenomegaly. There is no abdominal tenderness.   Musculoskeletal:         General: No edema.   Neurological: She is alert and oriented to person, place, and time.   Skin: Skin is warm and dry.   Psychiatric: She has a normal mood and affect. Her behavior is normal. Judgment and thought content normal.   Vitals reviewed.     ECG: sinus " tachycardia, otherwise normal ECG    Lab 6/1/20: TSH 2.48    Lab 1/2/20 Hgb 14.2, CMP WNL    Lab 2/5/15  Chol 193    HDL 47      Assessment:     1. Acquired hypothyroidism    2. Essential hypertension    3. Palpitations    4. Chest pain of uncertain etiology    5. Dyspnea on exertion    6. Sinus tachycardia      Plan:   Mary Alice was seen today for hypertension, shortness of breath and headache.    Diagnoses and all orders for this visit:    Acquired hypothyroidism  -     IN OFFICE EKG 12-LEAD (to Muse)  -     TSH; Future  -     T4, free; Future    Essential hypertension  -     IN OFFICE EKG 12-LEAD (to Muse)  -     CBC auto differential; Future  -     Comprehensive metabolic panel; Future  -     Lipid Panel; Future    Palpitations  -     IN OFFICE EKG 12-LEAD (to Muse)  -     Holter monitor - 24 hour; Future    Chest pain of uncertain etiology  -     IN OFFICE EKG 12-LEAD (to Muse)  -     Exercise Stress - EKG  -     Echo Color Flow Doppler? Yes; Future    Dyspnea on exertion  -     IN OFFICE EKG 12-LEAD (to Muse)  -     Comprehensive metabolic panel; Future  -     Echo Color Flow Doppler? Yes; Future    Sinus tachycardia  -     CBC auto differential; Future  -     Comprehensive metabolic panel; Future  -     TSH; Future  -     T4, free; Future  -     Echo Color Flow Doppler? Yes; Future  -     Holter monitor - 24 hour; Future    Other orders  -     losartan (COZAAR) 25 MG tablet; Take 1 tablet (25 mg total) by mouth once daily.    f/u  With eye MD    Add losartan 25 mg daily to HCTZ 25 mg daily    Stress test     Echocardiogram with doppler    24 hour holter     Repeat lab including TFT's and CBC and CMP and lipids    RTC one month    Bring in home machine next visit    Follow up in about 4 weeks (around 9/3/2020).

## 2020-08-07 ENCOUNTER — TELEPHONE (OUTPATIENT)
Dept: GASTROENTEROLOGY | Facility: CLINIC | Age: 55
End: 2020-08-07

## 2020-08-07 ENCOUNTER — TELEPHONE (OUTPATIENT)
Dept: ENDOCRINOLOGY | Facility: CLINIC | Age: 55
End: 2020-08-07

## 2020-08-07 NOTE — TELEPHONE ENCOUNTER
Advised pt that  does not have any sooner appts. Pt verbalized understanding.      ----- Message from Bassam Pollard sent at 8/7/2020  8:39 AM CDT -----  Contact: Patient  Pt called and would like to schedule an appt    She is having upper stomach pain    Pt can be reached at [phone]

## 2020-08-07 NOTE — TELEPHONE ENCOUNTER
Contacted PT to inform here that provider said it it too early for labs. PT understood and said she will get them done at a later date.      ----- Message from Anny Mills MD sent at 8/7/2020  2:11 PM CDT -----  Regarding: Thyroid labs  It looks like she just had her thyroid blood work done when she saw me 2 months ago.  The next time she needs to get it done is 4 months from now so getting it done now is too early.  ----- Message -----  From: Malorie Olivier MA  Sent: 8/7/2020  10:22 AM CDT  To: Anny Mills MD    PT wanted to know if she can get labs done that you had ordered for her today due to her seeing her cardiologist yesterday  ----- Message -----  From: Shiloh Elena  Sent: 8/7/2020   9:28 AM CDT  To: Gene Mccormick Staff    Pt states her cardiologist yesterday and told her thyroid levels are off she states she would like to do blood work early and come back this month instead of next month please call back to discuss

## 2020-08-13 ENCOUNTER — OFFICE VISIT (OUTPATIENT)
Dept: INTERNAL MEDICINE | Facility: CLINIC | Age: 55
End: 2020-08-13
Payer: COMMERCIAL

## 2020-08-13 VITALS
WEIGHT: 156.94 LBS | DIASTOLIC BLOOD PRESSURE: 86 MMHG | BODY MASS INDEX: 26.79 KG/M2 | HEIGHT: 64 IN | RESPIRATION RATE: 16 BRPM | OXYGEN SATURATION: 99 % | TEMPERATURE: 98 F | HEART RATE: 94 BPM | SYSTOLIC BLOOD PRESSURE: 116 MMHG

## 2020-08-13 DIAGNOSIS — E03.9 ACQUIRED HYPOTHYROIDISM: ICD-10-CM

## 2020-08-13 DIAGNOSIS — K21.9 GASTROESOPHAGEAL REFLUX DISEASE, ESOPHAGITIS PRESENCE NOT SPECIFIED: ICD-10-CM

## 2020-08-13 DIAGNOSIS — F33.9 EPISODE OF RECURRENT MAJOR DEPRESSIVE DISORDER, UNSPECIFIED DEPRESSION EPISODE SEVERITY: Primary | ICD-10-CM

## 2020-08-13 DIAGNOSIS — Z83.3 FAMILY HISTORY OF DIABETES MELLITUS: ICD-10-CM

## 2020-08-13 DIAGNOSIS — E87.6 HYPOKALEMIA: ICD-10-CM

## 2020-08-13 DIAGNOSIS — I10 ESSENTIAL HYPERTENSION: ICD-10-CM

## 2020-08-13 DIAGNOSIS — R35.0 URINARY FREQUENCY: ICD-10-CM

## 2020-08-13 DIAGNOSIS — G43.909 MIGRAINE WITHOUT STATUS MIGRAINOSUS, NOT INTRACTABLE, UNSPECIFIED MIGRAINE TYPE: ICD-10-CM

## 2020-08-13 DIAGNOSIS — E55.9 VITAMIN D DEFICIENCY: ICD-10-CM

## 2020-08-13 DIAGNOSIS — R63.1 INCREASED THIRST: ICD-10-CM

## 2020-08-13 DIAGNOSIS — Z76.89 ENCOUNTER TO ESTABLISH CARE: ICD-10-CM

## 2020-08-13 PROCEDURE — 99386 PR PREVENTIVE VISIT,NEW,40-64: ICD-10-PCS | Mod: S$GLB,,, | Performed by: NURSE PRACTITIONER

## 2020-08-13 PROCEDURE — 99386 PREV VISIT NEW AGE 40-64: CPT | Mod: S$GLB,,, | Performed by: NURSE PRACTITIONER

## 2020-08-13 PROCEDURE — 99999 PR PBB SHADOW E&M-EST. PATIENT-LVL IV: ICD-10-PCS | Mod: PBBFAC,,, | Performed by: NURSE PRACTITIONER

## 2020-08-13 PROCEDURE — 99999 PR PBB SHADOW E&M-EST. PATIENT-LVL IV: CPT | Mod: PBBFAC,,, | Performed by: NURSE PRACTITIONER

## 2020-08-13 RX ORDER — FLUOXETINE 10 MG/1
10 CAPSULE ORAL DAILY
Qty: 30 CAPSULE | Refills: 11 | Status: SHIPPED | OUTPATIENT
Start: 2020-08-13 | End: 2020-10-21

## 2020-08-13 RX ORDER — BUTALBITAL, ACETAMINOPHEN AND CAFFEINE 50; 325; 40 MG/1; MG/1; MG/1
1 TABLET ORAL EVERY 4 HOURS PRN
Qty: 30 TABLET | Refills: 0 | Status: SHIPPED | OUTPATIENT
Start: 2020-08-13 | End: 2021-04-16

## 2020-08-13 NOTE — PROGRESS NOTES
[facility] Note    Chief Complaint      Chief Complaint   Patient presents with    Pain     head, lower back and center, middle asb    Establish Care    Medication Management     History of Present Illness      Mary Alice Garcia is a 55 y.o. female patient who is new to me and presents today for establish care. , denies shortness of breath or chest pain, reviewed meds and history patient.  Patient reports she suffers with depression on and off, is currently having depression over the past few months.  Would like to restart Prozac.  Has tried Zoloft and Wellbutrin as well over the past years without resolution of depression with these meds.  Suffers with migraine headaches-causes sensitivity to light and sound during episode.    Labs-ordered  Colonoscopy-2-3 years ago  Gyn- saud Rivas np  Mammogram-yearly, mother had breast cancer    Problem List Items Addressed This Visit        Cardiac/Vascular    Essential hypertension       Endocrine    Hypothyroid    Overview     Postsurgical hypothyroidism since 2009 after thyroidectomy for growing nodule, found to be benign            GI    GERD (gastroesophageal reflux disease)      Other Visit Diagnoses     Episode of recurrent major depressive disorder, unspecified depression episode severity    -  Primary    Relevant Medications    FLUoxetine 10 MG capsule    Other Relevant Orders    Lipid Panel (Completed)    TSH (Completed)    T4, free (Completed)    CBC auto differential (Completed)    Comprehensive metabolic panel (Completed)    Hemoglobin A1C (Completed)    Vitamin D (Completed)    URINALYSIS    Encounter to establish care        Relevant Orders    Lipid Panel (Completed)    TSH (Completed)    T4, free (Completed)    CBC auto differential (Completed)    Comprehensive metabolic panel (Completed)    Hemoglobin A1C (Completed)    Vitamin D (Completed)    URINALYSIS    Increased thirst        Relevant Orders    Lipid Panel (Completed)    TSH  (Completed)    T4, free (Completed)    CBC auto differential (Completed)    Comprehensive metabolic panel (Completed)    Hemoglobin A1C (Completed)    Vitamin D (Completed)    URINALYSIS    Urinary frequency        Relevant Orders    Lipid Panel (Completed)    TSH (Completed)    T4, free (Completed)    CBC auto differential (Completed)    Comprehensive metabolic panel (Completed)    Hemoglobin A1C (Completed)    Vitamin D (Completed)    URINALYSIS    Family history of diabetes mellitus        Relevant Orders    Lipid Panel (Completed)    TSH (Completed)    T4, free (Completed)    CBC auto differential (Completed)    Comprehensive metabolic panel (Completed)    Hemoglobin A1C (Completed)    Vitamin D (Completed)    URINALYSIS    Vitamin D deficiency        Relevant Orders    Lipid Panel (Completed)    TSH (Completed)    T4, free (Completed)    CBC auto differential (Completed)    Comprehensive metabolic panel (Completed)    Hemoglobin A1C (Completed)    Vitamin D (Completed)    URINALYSIS    Hypokalemia        Relevant Orders    Lipid Panel (Completed)    TSH (Completed)    T4, free (Completed)    CBC auto differential (Completed)    Comprehensive metabolic panel (Completed)    Hemoglobin A1C (Completed)    Vitamin D (Completed)    URINALYSIS    Migraine without status migrainosus, not intractable, unspecified migraine type        Relevant Medications    butalbital-acetaminophen-caffeine -40 mg (FIORICET, ESGIC) -40 mg per tablet          Health Maintenance   Topic Date Due    Hepatitis C Screening  1965    TETANUS VACCINE  1985    Mammogram  2021    Lipid Panel  2025       Past Medical History:   Diagnosis Date    Arthritis     Depression     Hyperlipidemia     Hypertension     2013    Syncope and collapse     Thyroid disease     Tuberculosis        Past Surgical History:   Procedure Laterality Date     SECTION      HEMORRHOID SURGERY      KNEE SURGERY       right knee    thyroid removed      TONSILLECTOMY      TUBAL LIGATION         family history includes Cancer in her mother; Hyperlipidemia in her father; Hypertension in her brother and mother.    Social History     Tobacco Use    Smoking status: Never Smoker    Smokeless tobacco: Never Used   Substance Use Topics    Alcohol use: Not Currently     Alcohol/week: 3.0 standard drinks     Types: 3 Glasses of wine per week    Drug use: No       Review of Systems   Constitutional: Negative for chills and fever.   HENT: Negative for congestion and sore throat.    Eyes: Negative for blurred vision.   Respiratory: Negative for shortness of breath.    Cardiovascular: Negative for chest pain and palpitations.   Gastrointestinal: Negative for abdominal pain, constipation, diarrhea, nausea and vomiting.   Genitourinary: Negative for dysuria.   Musculoskeletal: Negative for myalgias.   Neurological: Negative for dizziness, weakness and headaches.   Psychiatric/Behavioral: Positive for depression. Negative for suicidal ideas. The patient is not nervous/anxious.         Outpatient Encounter Medications as of 8/13/2020   Medication Sig Dispense Refill    calcium carbonate (OS-FLO) 500 mg calcium (1,250 mg) tablet Take 2 tablets by mouth 2 (two) times daily.        cholecalciferol, vitamin D3, 125 mcg (5,000 unit) capsule Take 5,000 Units by mouth once daily.      docusate sodium (COLACE) 100 MG capsule Take 100 mg by mouth once daily.      fish oil-omega-3 fatty acids 300-1,000 mg capsule Take 2 g by mouth once daily.      hydroCHLOROthiazide (HYDRODIURIL) 25 MG tablet Take 1 tablet (25 mg total) by mouth once daily. 90 tablet 3    levothyroxine (SYNTHROID) 112 MCG tablet       losartan (COZAAR) 25 MG tablet Take 1 tablet (25 mg total) by mouth once daily. 90 tablet 3    butalbital-acetaminophen-caffeine -40 mg (FIORICET, ESGIC) -40 mg per tablet Take 1 tablet by mouth every 4 (four) hours as needed for  "Pain or Headaches. 30 tablet 0    FLUoxetine 10 MG capsule Take 1 capsule (10 mg total) by mouth once daily. 30 capsule 11     No facility-administered encounter medications on file as of 8/13/2020.         Review of patient's allergies indicates:   Allergen Reactions    Paroxetine hcl Rash and Nausea And Vomiting       Physical Exam      Vital Signs  Temp: 97.7 °F (36.5 °C)  Temp src: Temporal  Pulse: 94  Resp: 16  SpO2: 99 %  BP: 116/86  BP Location: Left arm  Patient Position: Sitting  Pain Score:   9  Pain Loc: Abdomen  Height and Weight  Height: 5' 3.5" (161.3 cm)  Weight: 71.2 kg (156 lb 15.5 oz)  BSA (Calculated - sq m): 1.79 sq meters  BMI (Calculated): 27.4  Weight in (lb) to have BMI = 25: 143.1]    Physical Exam  Vitals signs and nursing note reviewed.   Constitutional:       Appearance: Normal appearance. She is well-developed.   HENT:      Head: Normocephalic and atraumatic.      Right Ear: External ear normal.      Left Ear: External ear normal.   Eyes:      Conjunctiva/sclera: Conjunctivae normal.      Pupils: Pupils are equal, round, and reactive to light.   Neck:      Musculoskeletal: Normal range of motion and neck supple.      Thyroid: No thyromegaly.      Vascular: No JVD.      Trachea: No tracheal deviation.   Cardiovascular:      Rate and Rhythm: Normal rate and regular rhythm.      Heart sounds: Normal heart sounds. No murmur.   Pulmonary:      Effort: Pulmonary effort is normal.      Breath sounds: Normal breath sounds.   Abdominal:      General: Bowel sounds are normal.      Palpations: Abdomen is soft.   Musculoskeletal: Normal range of motion.   Lymphadenopathy:      Cervical: No cervical adenopathy.   Skin:     General: Skin is warm and dry.   Neurological:      Mental Status: She is alert and oriented to person, place, and time.   Psychiatric:         Behavior: Behavior normal.         Thought Content: Thought content normal.         Judgment: Judgment normal.      Comments: Patient " appears to be depressed          Laboratory:  CBC:  Recent Labs   Lab Result Units 08/14/20  0720   WBC K/uL 7.63   RBC M/uL 4.75   Hemoglobin g/dL 13.6   Hematocrit % 40.7   Platelets K/uL 320   Mean Corpuscular Volume fL 86   Mean Corpuscular Hemoglobin pg 28.6   Mean Corpuscular Hemoglobin Conc g/dL 33.4     CMP:  Recent Labs   Lab Result Units 08/14/20  0720   Glucose mg/dL 101   Calcium mg/dL 9.4   Albumin g/dL 3.8   Total Protein g/dL 7.7   Sodium mmol/L 139   Potassium mmol/L 4.0   CO2 mmol/L 28   Chloride mmol/L 101   BUN, Bld mg/dL 10   Alkaline Phosphatase U/L 46*   ALT U/L 17   AST U/L 19   Total Bilirubin mg/dL 0.4     URINALYSIS:  No results for input(s): COLORU, CLARITYU, SPECGRAV, PHUR, PROTEINUA, GLUCOSEU, BILIRUBINCON, BLOODU, WBCU, RBCU, BACTERIA, MUCUS, NITRITE, LEUKOCYTESUR, UROBILINOGEN, HYALINECASTS in the last 2160 hours.   LIPIDS:  Recent Labs   Lab Result Units 06/01/20  1705 08/14/20  0720   TSH uIU/mL 2.480 3.486   HDL mg/dL  --  45   Cholesterol mg/dL  --  202*   Triglycerides mg/dL  --  107   LDL Cholesterol mg/dL  --  135.6   Hdl/Cholesterol Ratio %  --  22.3   Non-HDL Cholesterol mg/dL  --  157   Total Cholesterol/HDL Ratio   --  4.5     TSH:  Recent Labs   Lab Result Units 06/01/20  1705 08/14/20  0720   TSH uIU/mL 2.480 3.486     A1C:  Recent Labs   Lab Result Units 08/14/20  0720   Hemoglobin A1C % 5.6         Assessment/Plan     Mary Alice Garcia is a 55 y.o.female with:    1. Encounter to establish care  - Lipid Panel; Future  - TSH; Future  - T4, free; Future  - CBC auto differential; Future  - Comprehensive metabolic panel; Future  - Hemoglobin A1C; Future  - Vitamin D; Future  - URINALYSIS    2. Episode of recurrent major depressive disorder, unspecified depression episode severity  - FLUoxetine 10 MG capsule; Take 1 capsule (10 mg total) by mouth once daily.  Dispense: 30 capsule; Refill: 11  - Lipid Panel; Future  - TSH; Future  - T4, free; Future  - CBC auto differential;  Future  - Comprehensive metabolic panel; Future  - Hemoglobin A1C; Future  - Vitamin D; Future  - URINALYSIS    3. Acquired hypothyroidism    4. Increased thirst  - Lipid Panel; Future  - TSH; Future  - T4, free; Future  - CBC auto differential; Future  - Comprehensive metabolic panel; Future  - Hemoglobin A1C; Future  - Vitamin D; Future  - URINALYSIS    5. Urinary frequency  - Lipid Panel; Future  - TSH; Future  - T4, free; Future  - CBC auto differential; Future  - Comprehensive metabolic panel; Future  - Hemoglobin A1C; Future  - Vitamin D; Future  - URINALYSIS    6. Family history of diabetes mellitus  - Lipid Panel; Future  - TSH; Future  - T4, free; Future  - CBC auto differential; Future  - Comprehensive metabolic panel; Future  - Hemoglobin A1C; Future  - Vitamin D; Future  - URINALYSIS    7. Vitamin D deficiency  - Lipid Panel; Future  - TSH; Future  - T4, free; Future  - CBC auto differential; Future  - Comprehensive metabolic panel; Future  - Hemoglobin A1C; Future  - Vitamin D; Future  - URINALYSIS    8. Hypokalemia  - Lipid Panel; Future  - TSH; Future  - T4, free; Future  - CBC auto differential; Future  - Comprehensive metabolic panel; Future  - Hemoglobin A1C; Future  - Vitamin D; Future  - URINALYSIS    9. Migraine without status migrainosus, not intractable, unspecified migraine type  Patient will trial med  - butalbital-acetaminophen-caffeine -40 mg (FIORICET, ESGIC) -40 mg per tablet; Take 1 tablet by mouth every 4 (four) hours as needed for Pain or Headaches.  Dispense: 30 tablet; Refill: 0    10. Essential hypertension  /86    11. Gastroesophageal reflux disease, esophagitis presence not specified    Will restart Prozac at 10 mg, patient return to clinic in a month for follow-up or sooner for any concerns.    -Continue current medications and maintain follow up with specialists.  Return to clinic in 3 months for establish with physician.        Erin Jiminez, NP-C [facility] [place]

## 2020-08-14 ENCOUNTER — LAB VISIT (OUTPATIENT)
Dept: LAB | Facility: HOSPITAL | Age: 55
End: 2020-08-14
Attending: NURSE PRACTITIONER
Payer: COMMERCIAL

## 2020-08-14 DIAGNOSIS — R35.0 URINARY FREQUENCY: ICD-10-CM

## 2020-08-14 DIAGNOSIS — Z00.00 ANNUAL PHYSICAL EXAM: ICD-10-CM

## 2020-08-14 DIAGNOSIS — F33.9 EPISODE OF RECURRENT MAJOR DEPRESSIVE DISORDER, UNSPECIFIED DEPRESSION EPISODE SEVERITY: ICD-10-CM

## 2020-08-14 DIAGNOSIS — R63.1 INCREASED THIRST: ICD-10-CM

## 2020-08-14 DIAGNOSIS — E55.9 VITAMIN D DEFICIENCY: ICD-10-CM

## 2020-08-14 DIAGNOSIS — Z83.3 FAMILY HISTORY OF DIABETES MELLITUS: ICD-10-CM

## 2020-08-14 DIAGNOSIS — E87.6 HYPOKALEMIA: ICD-10-CM

## 2020-08-14 LAB
25(OH)D3+25(OH)D2 SERPL-MCNC: 62 NG/ML (ref 30–96)
ALBUMIN SERPL BCP-MCNC: 3.8 G/DL (ref 3.5–5.2)
ALP SERPL-CCNC: 46 U/L (ref 55–135)
ALT SERPL W/O P-5'-P-CCNC: 17 U/L (ref 10–44)
ANION GAP SERPL CALC-SCNC: 10 MMOL/L (ref 8–16)
AST SERPL-CCNC: 19 U/L (ref 10–40)
BASOPHILS # BLD AUTO: 0.07 K/UL (ref 0–0.2)
BASOPHILS NFR BLD: 0.9 % (ref 0–1.9)
BILIRUB SERPL-MCNC: 0.4 MG/DL (ref 0.1–1)
BUN SERPL-MCNC: 10 MG/DL (ref 6–20)
CALCIUM SERPL-MCNC: 9.4 MG/DL (ref 8.7–10.5)
CHLORIDE SERPL-SCNC: 101 MMOL/L (ref 95–110)
CHOLEST SERPL-MCNC: 202 MG/DL (ref 120–199)
CHOLEST/HDLC SERPL: 4.5 {RATIO} (ref 2–5)
CO2 SERPL-SCNC: 28 MMOL/L (ref 23–29)
CREAT SERPL-MCNC: 0.7 MG/DL (ref 0.5–1.4)
DIFFERENTIAL METHOD: NORMAL
EOSINOPHIL # BLD AUTO: 0.3 K/UL (ref 0–0.5)
EOSINOPHIL NFR BLD: 3.8 % (ref 0–8)
ERYTHROCYTE [DISTWIDTH] IN BLOOD BY AUTOMATED COUNT: 12.6 % (ref 11.5–14.5)
EST. GFR  (AFRICAN AMERICAN): >60 ML/MIN/1.73 M^2
EST. GFR  (NON AFRICAN AMERICAN): >60 ML/MIN/1.73 M^2
ESTIMATED AVG GLUCOSE: 114 MG/DL (ref 68–131)
GLUCOSE SERPL-MCNC: 101 MG/DL (ref 70–110)
HBA1C MFR BLD HPLC: 5.6 % (ref 4–5.6)
HCT VFR BLD AUTO: 40.7 % (ref 37–48.5)
HDLC SERPL-MCNC: 45 MG/DL (ref 40–75)
HDLC SERPL: 22.3 % (ref 20–50)
HGB BLD-MCNC: 13.6 G/DL (ref 12–16)
IMM GRANULOCYTES # BLD AUTO: 0.01 K/UL (ref 0–0.04)
IMM GRANULOCYTES NFR BLD AUTO: 0.1 % (ref 0–0.5)
LDLC SERPL CALC-MCNC: 135.6 MG/DL (ref 63–159)
LYMPHOCYTES # BLD AUTO: 3.2 K/UL (ref 1–4.8)
LYMPHOCYTES NFR BLD: 42.1 % (ref 18–48)
MCH RBC QN AUTO: 28.6 PG (ref 27–31)
MCHC RBC AUTO-ENTMCNC: 33.4 G/DL (ref 32–36)
MCV RBC AUTO: 86 FL (ref 82–98)
MONOCYTES # BLD AUTO: 0.5 K/UL (ref 0.3–1)
MONOCYTES NFR BLD: 6.9 % (ref 4–15)
NEUTROPHILS # BLD AUTO: 3.5 K/UL (ref 1.8–7.7)
NEUTROPHILS NFR BLD: 46.2 % (ref 38–73)
NONHDLC SERPL-MCNC: 157 MG/DL
NRBC BLD-RTO: 0 /100 WBC
PLATELET # BLD AUTO: 320 K/UL (ref 150–350)
PMV BLD AUTO: 9.8 FL (ref 9.2–12.9)
POTASSIUM SERPL-SCNC: 4 MMOL/L (ref 3.5–5.1)
PROT SERPL-MCNC: 7.7 G/DL (ref 6–8.4)
RBC # BLD AUTO: 4.75 M/UL (ref 4–5.4)
SODIUM SERPL-SCNC: 139 MMOL/L (ref 136–145)
T4 FREE SERPL-MCNC: 1.03 NG/DL (ref 0.71–1.51)
TRIGL SERPL-MCNC: 107 MG/DL (ref 30–150)
TSH SERPL DL<=0.005 MIU/L-ACNC: 3.49 UIU/ML (ref 0.4–4)
WBC # BLD AUTO: 7.63 K/UL (ref 3.9–12.7)

## 2020-08-14 PROCEDURE — 80053 COMPREHEN METABOLIC PANEL: CPT

## 2020-08-14 PROCEDURE — 84443 ASSAY THYROID STIM HORMONE: CPT

## 2020-08-14 PROCEDURE — 82306 VITAMIN D 25 HYDROXY: CPT

## 2020-08-14 PROCEDURE — 36415 COLL VENOUS BLD VENIPUNCTURE: CPT

## 2020-08-14 PROCEDURE — 80061 LIPID PANEL: CPT

## 2020-08-14 PROCEDURE — 84439 ASSAY OF FREE THYROXINE: CPT

## 2020-08-14 PROCEDURE — 85025 COMPLETE CBC W/AUTO DIFF WBC: CPT

## 2020-08-14 PROCEDURE — 83036 HEMOGLOBIN GLYCOSYLATED A1C: CPT

## 2020-08-17 ENCOUNTER — PATIENT MESSAGE (OUTPATIENT)
Dept: INTERNAL MEDICINE | Facility: CLINIC | Age: 55
End: 2020-08-17

## 2020-08-17 NOTE — TELEPHONE ENCOUNTER
All labs look good  Total cholesterol at 202, want less than 200, not concerned, triglycerides and LDL are normal

## 2020-08-18 ENCOUNTER — TELEPHONE (OUTPATIENT)
Dept: ORTHOPEDICS | Facility: CLINIC | Age: 55
End: 2020-08-18

## 2020-08-18 ENCOUNTER — PATIENT MESSAGE (OUTPATIENT)
Dept: INTERNAL MEDICINE | Facility: CLINIC | Age: 55
End: 2020-08-18

## 2020-08-18 NOTE — TELEPHONE ENCOUNTER
Spoke to patient. Answered questions she had regarding surgery and how long she would be out of work.

## 2020-08-18 NOTE — TELEPHONE ENCOUNTER
----- Message from Meli Delacruz sent at 8/18/2020  3:37 PM CDT -----  Contact: [phone]  Type:  Needs Medical Advice    Who Called:  PT  Reason for Call: regarding if the Dr accepts Federal Comp Cases  Would the patient rather a call back or a response via [facility]?  Call back  Best Call Back Number: [phone]

## 2020-08-18 NOTE — TELEPHONE ENCOUNTER
----- Message from Meli Delacruz sent at 8/18/2020 10:56 AM CDT -----  Contact: [phone]  Type:  Needs Medical Advice    Who Called:  PT  Reason for call; regarding pt would like to speak with a nurse, pt did not say why  Would the patient rather a call back or a response via [facility]?  Call back  Best Call Back Number: [phone]

## 2020-08-18 NOTE — TELEPHONE ENCOUNTER
----- Message from Bryson Pressley sent at 8/18/2020 11:22 AM CDT -----  Regarding: Call back  Type:  Patient Returning Call    Who Called: patient  Who Left Message for Patient: Kenya  Does the patient know what this is regarding?: yes  Would the patient rather a call back or a response via [facility]?  Call back  Best Call Back Number: [phone]  Additional Information:  n/a

## 2020-08-18 NOTE — TELEPHONE ENCOUNTER
Spoke to patient. Gave number for [employer] in work comp. To give to  to have work comp claim placed in patients chart.

## 2020-08-19 ENCOUNTER — TELEPHONE (OUTPATIENT)
Dept: INTERNAL MEDICINE | Facility: CLINIC | Age: 55
End: 2020-08-19

## 2020-09-08 ENCOUNTER — PATIENT MESSAGE (OUTPATIENT)
Dept: INTERNAL MEDICINE | Facility: CLINIC | Age: 55
End: 2020-09-08

## 2020-09-15 ENCOUNTER — PATIENT MESSAGE (OUTPATIENT)
Dept: INTERNAL MEDICINE | Facility: CLINIC | Age: 55
End: 2020-09-15

## 2020-09-15 NOTE — TELEPHONE ENCOUNTER
When did she start the med, I saw her a month ago, she wouldn't be having a rash reaction now, it would have been in the first week. She can take zyrtec in am and benadryl in pm, stay hydrated with water.     The dose is low, we can increase it, or change it to something else. My suggestion is to increase med before trying to change

## 2020-10-16 RX ORDER — HYDROCHLOROTHIAZIDE 25 MG/1
25 TABLET ORAL DAILY
Qty: 90 TABLET | Refills: 3 | Status: SHIPPED | OUTPATIENT
Start: 2020-10-16 | End: 2023-07-11

## 2020-10-21 ENCOUNTER — HOSPITAL ENCOUNTER (OUTPATIENT)
Dept: RADIOLOGY | Facility: HOSPITAL | Age: 55
Discharge: HOME | End: 2020-10-21
Attending: NURSE PRACTITIONER
Payer: COMMERCIAL

## 2020-10-21 ENCOUNTER — OFFICE VISIT (OUTPATIENT)
Dept: INTERNAL MEDICINE | Facility: CLINIC | Age: 55
End: 2020-10-21
Payer: COMMERCIAL

## 2020-10-21 VITALS
SYSTOLIC BLOOD PRESSURE: 150 MMHG | HEIGHT: 64 IN | TEMPERATURE: 97 F | RESPIRATION RATE: 18 BRPM | BODY MASS INDEX: 26.72 KG/M2 | OXYGEN SATURATION: 98 % | HEART RATE: 88 BPM | WEIGHT: 156.5 LBS | DIASTOLIC BLOOD PRESSURE: 86 MMHG

## 2020-10-21 DIAGNOSIS — F41.9 ANXIETY AND DEPRESSION: ICD-10-CM

## 2020-10-21 DIAGNOSIS — M54.50 RECURRENT LOW BACK PAIN: ICD-10-CM

## 2020-10-21 DIAGNOSIS — T14.8XXA MUSCLE STRAIN: ICD-10-CM

## 2020-10-21 DIAGNOSIS — F32.A ANXIETY AND DEPRESSION: ICD-10-CM

## 2020-10-21 DIAGNOSIS — M54.50 ACUTE BILATERAL LOW BACK PAIN WITHOUT SCIATICA: Primary | ICD-10-CM

## 2020-10-21 DIAGNOSIS — M54.9 DORSALGIA, UNSPECIFIED: ICD-10-CM

## 2020-10-21 DIAGNOSIS — K21.9 GASTROESOPHAGEAL REFLUX DISEASE, UNSPECIFIED WHETHER ESOPHAGITIS PRESENT: ICD-10-CM

## 2020-10-21 DIAGNOSIS — R06.02 SOB (SHORTNESS OF BREATH): ICD-10-CM

## 2020-10-21 PROCEDURE — 99215 OFFICE O/P EST HI 40 MIN: CPT | Mod: S$GLB,,, | Performed by: NURSE PRACTITIONER

## 2020-10-21 PROCEDURE — 72110 X-RAY EXAM L-2 SPINE 4/>VWS: CPT | Mod: TC,PO

## 2020-10-21 PROCEDURE — 72110 XR LUMBAR SPINE COMPLETE 5 VIEW: ICD-10-PCS | Mod: 26,,, | Performed by: RADIOLOGY

## 2020-10-21 PROCEDURE — 72110 X-RAY EXAM L-2 SPINE 4/>VWS: CPT | Mod: 26,,, | Performed by: RADIOLOGY

## 2020-10-21 PROCEDURE — 99215 PR OFFICE/OUTPT VISIT, EST, LEVL V, 40-54 MIN: ICD-10-PCS | Mod: S$GLB,,, | Performed by: NURSE PRACTITIONER

## 2020-10-21 PROCEDURE — 99999 PR PBB SHADOW E&M-EST. PATIENT-LVL IV: CPT | Mod: PBBFAC,,, | Performed by: NURSE PRACTITIONER

## 2020-10-21 PROCEDURE — 99999 PR PBB SHADOW E&M-EST. PATIENT-LVL IV: ICD-10-PCS | Mod: PBBFAC,,, | Performed by: NURSE PRACTITIONER

## 2020-10-21 RX ORDER — METHYLPREDNISOLONE 4 MG/1
TABLET ORAL
Qty: 1 PACKAGE | Refills: 0 | Status: SHIPPED | OUTPATIENT
Start: 2020-10-21 | End: 2020-11-11

## 2020-10-21 RX ORDER — ESCITALOPRAM OXALATE 10 MG/1
10 TABLET ORAL DAILY
Qty: 30 TABLET | Refills: 6 | Status: SHIPPED | OUTPATIENT
Start: 2020-10-21 | End: 2021-04-16

## 2020-10-21 RX ORDER — PANTOPRAZOLE SODIUM 20 MG/1
20 TABLET, DELAYED RELEASE ORAL DAILY
Qty: 30 TABLET | Refills: 0 | Status: SHIPPED | OUTPATIENT
Start: 2020-10-21 | End: 2020-11-30

## 2020-10-21 RX ORDER — METHOCARBAMOL 500 MG/1
500 TABLET, FILM COATED ORAL 2 TIMES DAILY PRN
Qty: 20 TABLET | Refills: 0 | Status: SHIPPED | OUTPATIENT
Start: 2020-10-21 | End: 2020-10-31

## 2020-10-21 RX ORDER — METHOCARBAMOL 500 MG/1
500 TABLET, FILM COATED ORAL 4 TIMES DAILY
Qty: 40 TABLET | Refills: 0 | Status: SHIPPED | OUTPATIENT
Start: 2020-10-21 | End: 2020-10-21

## 2020-10-21 NOTE — PROGRESS NOTES
[facility] Note    Chief Complaint      Chief Complaint   Patient presents with    Back Pain    Shortness of Breath    Gastroesophageal Reflux     Emily seltzer, Rolaids, Tums and Pepto Bismul is not effective     History of Present Illness      Mary Alice Garcia is a 55 y.o. female patient of Dr. Azul who presents today for c/o worsening reflux, dyspneic on exertion, anxiety/depression, low back pain.  BP noted elevated in clinic today    Patient also complains worsening depression lately.  Patient has tried Zoloft, Wellbutrin, Paxil, and Prozac.  Patient would like to start another medication.  Discussed Lexapro.    Problem List Items Addressed This Visit        GI    GERD (gastroesophageal reflux disease)    Relevant Medications    escitalopram oxalate (LEXAPRO) 10 MG tablet    pantoprazole (PROTONIX) 20 MG tablet    methylPREDNISolone (MEDROL DOSEPACK) 4 mg tablet    methocarbamoL (ROBAXIN) 500 MG Tab      Other Visit Diagnoses     Acute bilateral low back pain without sciatica    -  Primary    Relevant Medications    escitalopram oxalate (LEXAPRO) 10 MG tablet    pantoprazole (PROTONIX) 20 MG tablet    methylPREDNISolone (MEDROL DOSEPACK) 4 mg tablet    methocarbamoL (ROBAXIN) 500 MG Tab    Muscle strain        Relevant Medications    escitalopram oxalate (LEXAPRO) 10 MG tablet    pantoprazole (PROTONIX) 20 MG tablet    methylPREDNISolone (MEDROL DOSEPACK) 4 mg tablet    methocarbamoL (ROBAXIN) 500 MG Tab    Other Relevant Orders    X-Ray Lumbar Spine Complete 5 View (Completed)    Dorsalgia, unspecified        Relevant Orders    X-Ray Lumbar Spine Complete 5 View (Completed)    Recurrent low back pain        Relevant Orders    X-Ray Lumbar Spine Complete 5 View (Completed)    Anxiety and depression        Relevant Medications    escitalopram oxalate (LEXAPRO) 10 MG tablet    pantoprazole (PROTONIX) 20 MG tablet    methylPREDNISolone (MEDROL DOSEPACK) 4 mg tablet    methocarbamoL (ROBAXIN) 500 MG  Tab    SOB (shortness of breath)        Relevant Medications    escitalopram oxalate (LEXAPRO) 10 MG tablet    pantoprazole (PROTONIX) 20 MG tablet    methylPREDNISolone (MEDROL DOSEPACK) 4 mg tablet    methocarbamoL (ROBAXIN) 500 MG Tab          Health Maintenance   Topic Date Due    Hepatitis C Screening  1965    TETANUS VACCINE  1985    Mammogram  2021    Lipid Panel  2025       Past Medical History:   Diagnosis Date    Arthritis     Depression     Hyperlipidemia     Hypertension     2013    Syncope and collapse     Thyroid disease     Tuberculosis        Past Surgical History:   Procedure Laterality Date     SECTION      HEMORRHOID SURGERY      KNEE SURGERY      right knee    thyroid removed      TONSILLECTOMY      TUBAL LIGATION         family history includes Cancer in her mother; Hyperlipidemia in her father; Hypertension in her brother and mother.    Social History     Tobacco Use    Smoking status: Never Smoker    Smokeless tobacco: Never Used   Substance Use Topics    Alcohol use: Not Currently     Alcohol/week: 3.0 standard drinks     Types: 3 Glasses of wine per week    Drug use: No       Review of Systems   Constitutional: Negative for fever.   Respiratory: Negative for shortness of breath.    Cardiovascular: Negative for chest pain and palpitations.   Gastrointestinal: Positive for heartburn. Negative for abdominal pain, constipation, diarrhea, nausea and vomiting.   Musculoskeletal: Positive for back pain.   Neurological: Negative for headaches.   Psychiatric/Behavioral: Positive for depression. The patient is nervous/anxious.         Outpatient Encounter Medications as of 10/21/2020   Medication Sig Dispense Refill    butalbital-acetaminophen-caffeine -40 mg (FIORICET, ESGIC) -40 mg per tablet Take 1 tablet by mouth every 4 (four) hours as needed for Pain or Headaches. 30 tablet 0    calcium carbonate (OS-FLO) 500 mg calcium (1,250  "mg) tablet Take 2 tablets by mouth 2 (two) times daily.        cholecalciferol, vitamin D3, 125 mcg (5,000 unit) capsule Take 5,000 Units by mouth once daily.      docusate sodium (COLACE) 100 MG capsule Take 100 mg by mouth once daily.      fish oil-omega-3 fatty acids 300-1,000 mg capsule Take 2 g by mouth once daily.      hydroCHLOROthiazide (HYDRODIURIL) 25 MG tablet Take 1 tablet (25 mg total) by mouth once daily. 90 tablet 3    levothyroxine (SYNTHROID) 112 MCG tablet       losartan (COZAAR) 25 MG tablet Take 1 tablet (25 mg total) by mouth once daily. 90 tablet 3    [DISCONTINUED] FLUoxetine 10 MG capsule Take 1 capsule (10 mg total) by mouth once daily. 30 capsule 11    escitalopram oxalate (LEXAPRO) 10 MG tablet Take 1 tablet (10 mg total) by mouth once daily. 30 tablet 6    methocarbamoL (ROBAXIN) 500 MG Tab Take 1 tablet (500 mg total) by mouth 2 (two) times daily as needed. 20 tablet 0    methylPREDNISolone (MEDROL DOSEPACK) 4 mg tablet use as directed 1 Package 0    pantoprazole (PROTONIX) 20 MG tablet Take 1 tablet (20 mg total) by mouth once daily. 30 tablet 0    [DISCONTINUED] methocarbamoL (ROBAXIN) 500 MG Tab Take 1 tablet (500 mg total) by mouth 4 (four) times daily. for 10 days 40 tablet 0     No facility-administered encounter medications on file as of 10/21/2020.         Review of patient's allergies indicates:   Allergen Reactions    Paroxetine hcl Rash and Nausea And Vomiting       Physical Exam      Vital Signs  Temp: 97.3 °F (36.3 °C)  Temp src: Temporal  Pulse: 88  Resp: 18  SpO2: 98 %  BP: (!) 150/86  BP Location: Left arm  Patient Position: Sitting  Pain Score: 0-No pain  Height and Weight  Height: 5' 3.5" (161.3 cm)  Weight: 71 kg (156 lb 8.4 oz)  BSA (Calculated - sq m): 1.78 sq meters  BMI (Calculated): 27.3  Weight in (lb) to have BMI = 25: 143.1]    Physical Exam  Vitals signs and nursing note reviewed.   Constitutional:       Appearance: Normal appearance. She is " well-developed.   HENT:      Head: Normocephalic and atraumatic.      Right Ear: External ear normal.      Left Ear: External ear normal.   Eyes:      Conjunctiva/sclera: Conjunctivae normal.   Neck:      Musculoskeletal: Normal range of motion and neck supple.      Thyroid: No thyromegaly.      Vascular: No JVD.      Trachea: No tracheal deviation.   Cardiovascular:      Rate and Rhythm: Normal rate and regular rhythm.      Heart sounds: Normal heart sounds.   Pulmonary:      Effort: Pulmonary effort is normal.      Breath sounds: Normal breath sounds.   Abdominal:      Palpations: Abdomen is soft.   Musculoskeletal: Normal range of motion.         General: Tenderness and signs of injury present. No swelling or deformity.      Right lower leg: No edema.      Left lower leg: No edema.   Skin:     General: Skin is warm and dry.   Neurological:      Mental Status: She is alert and oriented to person, place, and time.   Psychiatric:         Behavior: Behavior normal.         Thought Content: Thought content normal.         Judgment: Judgment normal.          Laboratory:  CBC:  Recent Labs   Lab Result Units 08/14/20  0720   WBC K/uL 7.63   RBC M/uL 4.75   Hemoglobin g/dL 13.6   Hematocrit % 40.7   Platelets K/uL 320   MCV fL 86   MCH pg 28.6   MCHC g/dL 33.4     CMP:  Recent Labs   Lab Result Units 08/14/20  0720   Glucose mg/dL 101   Calcium mg/dL 9.4   Albumin g/dL 3.8   Total Protein g/dL 7.7   Sodium mmol/L 139   Potassium mmol/L 4.0   CO2 mmol/L 28   Chloride mmol/L 101   BUN mg/dL 10   Alkaline Phosphatase U/L 46*   ALT U/L 17   AST U/L 19   Total Bilirubin mg/dL 0.4     URINALYSIS:  No results for input(s): COLORU, CLARITYU, SPECGRAV, PHUR, PROTEINUA, GLUCOSEU, BILIRUBINCON, BLOODU, WBCU, RBCU, BACTERIA, MUCUS, NITRITE, LEUKOCYTESUR, UROBILINOGEN, HYALINECASTS in the last 2160 hours.   LIPIDS:  Recent Labs   Lab Result Units 08/14/20  0720   TSH uIU/mL 3.486   HDL mg/dL 45   Cholesterol mg/dL 202*    Triglycerides mg/dL 107   LDL Cholesterol mg/dL 135.6   HDL/Cholesterol Ratio % 22.3   Non-HDL Cholesterol mg/dL 157   Total Cholesterol/HDL Ratio  4.5     TSH:  Recent Labs   Lab Result Units 08/14/20  0720   TSH uIU/mL 3.486     A1C:  Recent Labs   Lab Result Units 08/14/20  0720   Hemoglobin A1C % 5.6         Assessment/Plan     Mary Alice Garcia is a 55 y.o.female with:    Encounter Diagnoses   Name Primary?    Acute bilateral low back pain without sciatica Yes    Muscle strain     Dorsalgia, unspecified     Recurrent low back pain     Anxiety and depression     Gastroesophageal reflux disease, unspecified whether esophagitis present     SOB (shortness of breath)         PLAN    Stay hydrated, lytes stretches, may apply warm and cold compresses to site.  Return to clinic in 1 month for follow-up on depression/anxiety med.    -Continue current medications and maintain follow up with specialists.  Return to clinic as needed for any concerns.            Erin Jiminez, NP-C [facility] [place]

## 2020-10-22 ENCOUNTER — PATIENT MESSAGE (OUTPATIENT)
Dept: INTERNAL MEDICINE | Facility: CLINIC | Age: 55
End: 2020-10-22

## 2020-10-22 NOTE — TELEPHONE ENCOUNTER
Back xray:    FINDINGS:  Vertebral body heights are well maintained.  Disc spaces are maintained.  Mild degenerative facet hypertrophic changes L5-S1.  AP alignment is anatomic.     Impression:     Mild degenerative change lower lumbar spine.  Complete rx but for future  Take anti-inflammatory when having a flare, watch posture, practice good body mechanics.

## 2020-10-29 NOTE — TELEPHONE ENCOUNTER
I recommend that the patient taken over-the-counter probiotic to assist with diarrhea.  It is okay for the patient take a few days off of work.  Does the patient need a letter?

## 2020-11-02 ENCOUNTER — PATIENT MESSAGE (OUTPATIENT)
Dept: INTERNAL MEDICINE | Facility: CLINIC | Age: 55
End: 2020-11-02

## 2020-11-02 NOTE — LETTER
November 2, 2020      [facility]  [address]  [place] [address]  Phone: [phone]  Fax: [phone]       Patient: Mary Alice Garcia   YOB: 1965  Date of Visit: 11/02/2020    To Whom It May Concern:    Anuel Garcia  was at [facility] on 10/29/2020. She may return to work/school on 11/03/2020 with no restrictions. If you have any questions or concerns, or if I can be of further assistance, please do not hesitate to contact me.    Sincerely,    Kelley Badillo, NPC

## 2020-11-18 ENCOUNTER — OFFICE VISIT (OUTPATIENT)
Dept: GASTROENTEROLOGY | Facility: CLINIC | Age: 55
End: 2020-11-18
Payer: COMMERCIAL

## 2020-11-18 ENCOUNTER — TELEPHONE (OUTPATIENT)
Dept: GASTROENTEROLOGY | Facility: CLINIC | Age: 55
End: 2020-11-18

## 2020-11-18 VITALS
BODY MASS INDEX: 27.59 KG/M2 | WEIGHT: 161.63 LBS | HEIGHT: 64 IN | SYSTOLIC BLOOD PRESSURE: 143 MMHG | DIASTOLIC BLOOD PRESSURE: 97 MMHG

## 2020-11-18 DIAGNOSIS — R10.13 EPIGASTRIC PAIN: ICD-10-CM

## 2020-11-18 DIAGNOSIS — R14.0 ABDOMINAL BLOATING: ICD-10-CM

## 2020-11-18 DIAGNOSIS — K21.9 GASTROESOPHAGEAL REFLUX DISEASE, UNSPECIFIED WHETHER ESOPHAGITIS PRESENT: Primary | ICD-10-CM

## 2020-11-18 DIAGNOSIS — K59.00 CONSTIPATION, UNSPECIFIED CONSTIPATION TYPE: ICD-10-CM

## 2020-11-18 PROCEDURE — 1126F AMNT PAIN NOTED NONE PRSNT: CPT | Mod: S$GLB,,, | Performed by: NURSE PRACTITIONER

## 2020-11-18 PROCEDURE — 3080F PR MOST RECENT DIASTOLIC BLOOD PRESSURE >= 90 MM HG: ICD-10-PCS | Mod: CPTII,S$GLB,, | Performed by: NURSE PRACTITIONER

## 2020-11-18 PROCEDURE — 99214 PR OFFICE/OUTPT VISIT, EST, LEVL IV, 30-39 MIN: ICD-10-PCS | Mod: S$GLB,,, | Performed by: NURSE PRACTITIONER

## 2020-11-18 PROCEDURE — 3077F PR MOST RECENT SYSTOLIC BLOOD PRESSURE >= 140 MM HG: ICD-10-PCS | Mod: CPTII,S$GLB,, | Performed by: NURSE PRACTITIONER

## 2020-11-18 PROCEDURE — 3080F DIAST BP >= 90 MM HG: CPT | Mod: CPTII,S$GLB,, | Performed by: NURSE PRACTITIONER

## 2020-11-18 PROCEDURE — 3008F BODY MASS INDEX DOCD: CPT | Mod: CPTII,S$GLB,, | Performed by: NURSE PRACTITIONER

## 2020-11-18 PROCEDURE — 3077F SYST BP >= 140 MM HG: CPT | Mod: CPTII,S$GLB,, | Performed by: NURSE PRACTITIONER

## 2020-11-18 PROCEDURE — 3008F PR BODY MASS INDEX (BMI) DOCUMENTED: ICD-10-PCS | Mod: CPTII,S$GLB,, | Performed by: NURSE PRACTITIONER

## 2020-11-18 PROCEDURE — 99999 PR PBB SHADOW E&M-EST. PATIENT-LVL III: ICD-10-PCS | Mod: PBBFAC,,, | Performed by: NURSE PRACTITIONER

## 2020-11-18 PROCEDURE — 1126F PR PAIN SEVERITY QUANTIFIED, NO PAIN PRESENT: ICD-10-PCS | Mod: S$GLB,,, | Performed by: NURSE PRACTITIONER

## 2020-11-18 PROCEDURE — 99999 PR PBB SHADOW E&M-EST. PATIENT-LVL III: CPT | Mod: PBBFAC,,, | Performed by: NURSE PRACTITIONER

## 2020-11-18 PROCEDURE — 99214 OFFICE O/P EST MOD 30 MIN: CPT | Mod: S$GLB,,, | Performed by: NURSE PRACTITIONER

## 2020-11-18 NOTE — H&P (VIEW-ONLY)
GASTROENTEROLOGY CLINIC NOTE    Chief Complaint: There were no encounter diagnoses.  Referring provider/PCP: Giovanni Martinez MD    HPI:  Mary Alice Garcia is a 55 y.o. female who is a new patient to me with a PMH that is significant for Arthritis, Depression, Hyperlipidemia, Hypertension, Syncope and collapse, Thyroid disease, and Tuberculosis.  Shee is here today to establish care for GERD and bloating.  This is a new problem that has been intermittent over the last three months.  She reports her symptoms would come an go over the last three months but have worsened over the last few weeks and are occurring daily.  She reports epigastric pain, bloated, feeling full, water brash, occasional dysphagia, and occasional regurgitation. She currently denies nocturnal symptoms but reports it has occurred in the past. Symptoms occur approximately 30-45 minutes following meals.  She has tried Alkaseltzer, Pepto Bismol,and Rolaids. She is currently taking Protonix 20mg and reports it is not helping with her symptoms. She denies hoarseness, coughing, or constant throat clearing. She denies late meals and NSAID usage. She does endorse occasional caffeine intake and reports daily ETOH use of 12 ox beer. She does also report to me she has a h/o constipation but is controlled with OTC stool softeners. Denies melena, hematochezia, changes in bowel habits, or unexplained weight loss.     Prior Upper Endoscopy: No  Prior Colonoscopy: 2017. Per patient, no abnormal finding.   Family h/o Colon Cancer: No  Family h/o Crohn's Disease or Ulcerative Colitis: No  Abdominal Surgeries: No    GI ROS:  Reflux: Yes  Dysphagia: No   Constipation: yes, controlled.   Diarrhea: No  Rectal bleeding/Melena/hematemesis: No  NSAIDs: No  Anticoagulation or Antiplatelet: No    Review of Systems   Constitutional: Negative for weight loss.   HENT: Negative for sore throat.    Eyes: Negative for blurred vision.   Respiratory: Negative for cough.     Cardiovascular: Negative for chest pain.   Gastrointestinal: Positive for abdominal pain (Epigastric), constipation, heartburn and nausea. Negative for blood in stool, diarrhea, melena and vomiting.   Genitourinary: Negative for dysuria.   Musculoskeletal: Negative for myalgias.   Skin: Negative for rash.   Neurological: Negative for headaches.   Endo/Heme/Allergies: Negative for environmental allergies.   Psychiatric/Behavioral: Negative for suicidal ideas. The patient is not nervous/anxious.        Past Medical History: has a past medical history of Arthritis, Depression, Hyperlipidemia, Hypertension, Syncope and collapse, Thyroid disease, and Tuberculosis.    Past Surgical History: has a past surgical history that includes  section; Tonsillectomy; Tubal ligation; Knee surgery; Hemorrhoid surgery; and thyroid removed.    Family History:family history includes Cancer in her mother; Hyperlipidemia in her father; Hypertension in her brother and mother.    Allergies:   Review of patient's allergies indicates:   Allergen Reactions    Paroxetine hcl Rash and Nausea And Vomiting       Social History: reports that she has never smoked. She has never used smokeless tobacco. She reports previous alcohol use of about 3.0 standard drinks of alcohol per week. She reports that she does not use drugs.    Home medications:   Current Outpatient Medications on File Prior to Visit   Medication Sig Dispense Refill    butalbital-acetaminophen-caffeine -40 mg (FIORICET, ESGIC) -40 mg per tablet Take 1 tablet by mouth every 4 (four) hours as needed for Pain or Headaches. 30 tablet 0    calcium carbonate (OS-FLO) 500 mg calcium (1,250 mg) tablet Take 2 tablets by mouth 2 (two) times daily.        cholecalciferol, vitamin D3, 125 mcg (5,000 unit) capsule Take 5,000 Units by mouth once daily.      docusate sodium (COLACE) 100 MG capsule Take 100 mg by mouth once daily.      fish oil-omega-3 fatty acids 300-1,000  "mg capsule Take 2 g by mouth once daily.      hydroCHLOROthiazide (HYDRODIURIL) 25 MG tablet Take 1 tablet (25 mg total) by mouth once daily. 90 tablet 3    levothyroxine (SYNTHROID) 112 MCG tablet       losartan (COZAAR) 25 MG tablet Take 1 tablet (25 mg total) by mouth once daily. 90 tablet 3    pantoprazole (PROTONIX) 20 MG tablet Take 1 tablet (20 mg total) by mouth once daily. 30 tablet 0    escitalopram oxalate (LEXAPRO) 10 MG tablet Take 1 tablet (10 mg total) by mouth once daily. (Patient not taking: Reported on 11/18/2020) 30 tablet 6     No current facility-administered medications on file prior to visit.        Vital signs:  BP (!) 143/97   Ht 5' 3.5" (1.613 m)   Wt 73.3 kg (161 lb 9.6 oz)   BMI 28.18 kg/m²     Physical Exam  Vitals signs reviewed.   Constitutional:       General: She is not in acute distress.     Appearance: Normal appearance. She is not ill-appearing.   HENT:      Head: Normocephalic.   Cardiovascular:      Rate and Rhythm: Normal rate and regular rhythm.      Heart sounds: Normal heart sounds. No murmur.   Pulmonary:      Effort: Pulmonary effort is normal. No respiratory distress.      Breath sounds: Normal breath sounds.   Chest:      Chest wall: No tenderness.   Abdominal:      General: Bowel sounds are normal. There is no distension.      Palpations: Abdomen is soft.      Tenderness: There is no abdominal tenderness. Negative signs include Samuel's sign.      Hernia: No hernia is present.   Skin:     General: Skin is warm.   Neurological:      Mental Status: She is alert and oriented to person, place, and time.   Psychiatric:         Mood and Affect: Mood normal.         Behavior: Behavior normal.         Routine labs:  Lab Results   Component Value Date    WBC 7.63 08/14/2020    HGB 13.6 08/14/2020    HCT 40.7 08/14/2020    MCV 86 08/14/2020     08/14/2020     No results found for: INR  No results found for: IRON, FERRITIN, TIBC, FESATURATED  Lab Results   Component " Value Date     08/14/2020    K 4.0 08/14/2020     08/14/2020    CO2 28 08/14/2020    BUN 10 08/14/2020    CREATININE 0.7 08/14/2020     Lab Results   Component Value Date    ALBUMIN 3.8 08/14/2020    ALT 17 08/14/2020    AST 19 08/14/2020    ALKPHOS 46 (L) 08/14/2020    BILITOT 0.4 08/14/2020     No results found for: GLUCOSE  Lab Results   Component Value Date    TSH 3.486 08/14/2020     Lab Results   Component Value Date    CALCIUM 9.4 08/14/2020       Imaging:      I have reviewed prior labs, imaging, and notes.      Assessment:  1. Gastroesophageal reflux disease, unspecified whether esophagitis present    2. Epigastric pain    3. Abdominal bloating    4. Constipation, unspecified constipation type        Plan:  Orders Placed This Encounter    COVID-19 Routine Screening    Case request GI: EGD (ESOPHAGOGASTRODUODENOSCOPY)     EGD. Consider biopsy for H.pylori  Continue Protonix 20mg daily. Take 30 minutes before first meal of the day.  Continue stool softener as needed for constipation    Consider increasing Protonix dose to 40mg daily pending EGD results.     Plan of care discussed with patient who is in agreement and verbalized understanding.           JAM Kyle,FNP-BC  [facility]

## 2020-11-18 NOTE — LETTER
November 18, 2020      [facility]  [address], [address]  [place] [address]  Phone: [phone]       Patient: Mary Alice Garcia   YOB: 1965  Date of Visit: 11/18/2020    To Whom It May Concern:    Anuel Garcia  was at [facility] on 11/18/2020. She may return to work/school on 11/19/20 with no restrictions. If you have any questions or concerns, or if I can be of further assistance, please do not hesitate to contact me.    Sincerely,          Ly Astorga NP

## 2020-11-18 NOTE — TELEPHONE ENCOUNTER
----- Message from Alfreda Mccracken sent at 11/18/2020  8:04 AM CST -----  Type:  Needs Medical Advice    Who Called: self  Reason:returning call  Would the patient rather a call back or a response via [email or web address]? call  Best Call Back Number: [phone]  Additional Information: none

## 2020-11-18 NOTE — PATIENT INSTRUCTIONS
EGD Prep Instructions    [facility] [address] [place] [address]    You are scheduled for an EGD with Dr. Galeano on 11/23/20 at [facility] located at [address] [place] [address].  Check in at the admit desk, first floor of the hospital (which is the building on the left).   You will receive a call 2-3 days before your EGD to tell you the time to arrive.  If you have not received a call by the day before your procedure, call the Endoscopy Lab at [phone].    A responsible adult (family member or friend) must come with you and transport you home.  You are not allowed to drive, take a taxi/ride share or bus or leave the Endoscopy Center alone.  If you do not have a responsible adult with you to take you home, your exam will be cancelled.     Screening Covid test is mandated 72 hours before procedure.      Nothing to eat or drink after midnight before the procedure.  You MAY brush your teeth.    You MAY take your blood pressure, heart, and seizure medication on the morning of the procedure, with a SIP of water.  Hold ALL other medications until after the procedure.    If you are on blood thinners THAT YOU HAVE BEEN INSTRUCTED TO HOLD BY YOUR DOCTOR FOR THIS PROCEDURE, then do NOT take this the morning of your EGD.  Do NOT stop these medications on your own, they must be approved to be held by your doctor.  Your EGD can NOT be done if you are on these medications.  Examples of blood thinners include: Coumadin, Aggrenox, Plavix, Pradaxa, Reapro, Pletal, Xarelto, Ticagrelor, Brilinta, Eliquis, and high dose aspirin (325 mg).  You do not have to stop baby aspirin 81 mg.    If  you have questions about procedure or procedure instructions, please call the office at .    Thank you for choosing [facility].

## 2020-11-18 NOTE — PROGRESS NOTES
GASTROENTEROLOGY CLINIC NOTE    Chief Complaint: There were no encounter diagnoses.  Referring provider/PCP: Giovanni Martinez MD    HPI:  Mary Alice Garcia is a 55 y.o. female who is a new patient to me with a PMH that is significant for Arthritis, Depression, Hyperlipidemia, Hypertension, Syncope and collapse, Thyroid disease, and Tuberculosis.  Shee is here today to establish care for GERD and bloating.  This is a new problem that has been intermittent over the last three months.  She reports her symptoms would come an go over the last three months but have worsened over the last few weeks and are occurring daily.  She reports epigastric pain, bloated, feeling full, water brash, occasional dysphagia, and occasional regurgitation. She currently denies nocturnal symptoms but reports it has occurred in the past. Symptoms occur approximately 30-45 minutes following meals.  She has tried Alkaseltzer, Pepto Bismol,and Rolaids. She is currently taking Protonix 20mg and reports it is not helping with her symptoms. She denies hoarseness, coughing, or constant throat clearing. She denies late meals and NSAID usage. She does endorse occasional caffeine intake and reports daily ETOH use of 12 ox beer. She does also report to me she has a h/o constipation but is controlled with OTC stool softeners. Denies melena, hematochezia, changes in bowel habits, or unexplained weight loss.     Prior Upper Endoscopy: No  Prior Colonoscopy: 2017. Per patient, no abnormal finding.   Family h/o Colon Cancer: No  Family h/o Crohn's Disease or Ulcerative Colitis: No  Abdominal Surgeries: No    GI ROS:  Reflux: Yes  Dysphagia: No   Constipation: yes, controlled.   Diarrhea: No  Rectal bleeding/Melena/hematemesis: No  NSAIDs: No  Anticoagulation or Antiplatelet: No    Review of Systems   Constitutional: Negative for weight loss.   HENT: Negative for sore throat.    Eyes: Negative for blurred vision.   Respiratory: Negative for cough.     Cardiovascular: Negative for chest pain.   Gastrointestinal: Positive for abdominal pain (Epigastric), constipation, heartburn and nausea. Negative for blood in stool, diarrhea, melena and vomiting.   Genitourinary: Negative for dysuria.   Musculoskeletal: Negative for myalgias.   Skin: Negative for rash.   Neurological: Negative for headaches.   Endo/Heme/Allergies: Negative for environmental allergies.   Psychiatric/Behavioral: Negative for suicidal ideas. The patient is not nervous/anxious.        Past Medical History: has a past medical history of Arthritis, Depression, Hyperlipidemia, Hypertension, Syncope and collapse, Thyroid disease, and Tuberculosis.    Past Surgical History: has a past surgical history that includes  section; Tonsillectomy; Tubal ligation; Knee surgery; Hemorrhoid surgery; and thyroid removed.    Family History:family history includes Cancer in her mother; Hyperlipidemia in her father; Hypertension in her brother and mother.    Allergies:   Review of patient's allergies indicates:   Allergen Reactions    Paroxetine hcl Rash and Nausea And Vomiting       Social History: reports that she has never smoked. She has never used smokeless tobacco. She reports previous alcohol use of about 3.0 standard drinks of alcohol per week. She reports that she does not use drugs.    Home medications:   Current Outpatient Medications on File Prior to Visit   Medication Sig Dispense Refill    butalbital-acetaminophen-caffeine -40 mg (FIORICET, ESGIC) -40 mg per tablet Take 1 tablet by mouth every 4 (four) hours as needed for Pain or Headaches. 30 tablet 0    calcium carbonate (OS-FLO) 500 mg calcium (1,250 mg) tablet Take 2 tablets by mouth 2 (two) times daily.        cholecalciferol, vitamin D3, 125 mcg (5,000 unit) capsule Take 5,000 Units by mouth once daily.      docusate sodium (COLACE) 100 MG capsule Take 100 mg by mouth once daily.      fish oil-omega-3 fatty acids 300-1,000  "mg capsule Take 2 g by mouth once daily.      hydroCHLOROthiazide (HYDRODIURIL) 25 MG tablet Take 1 tablet (25 mg total) by mouth once daily. 90 tablet 3    levothyroxine (SYNTHROID) 112 MCG tablet       losartan (COZAAR) 25 MG tablet Take 1 tablet (25 mg total) by mouth once daily. 90 tablet 3    pantoprazole (PROTONIX) 20 MG tablet Take 1 tablet (20 mg total) by mouth once daily. 30 tablet 0    escitalopram oxalate (LEXAPRO) 10 MG tablet Take 1 tablet (10 mg total) by mouth once daily. (Patient not taking: Reported on 11/18/2020) 30 tablet 6     No current facility-administered medications on file prior to visit.        Vital signs:  BP (!) 143/97   Ht 5' 3.5" (1.613 m)   Wt 73.3 kg (161 lb 9.6 oz)   BMI 28.18 kg/m²     Physical Exam  Vitals signs reviewed.   Constitutional:       General: She is not in acute distress.     Appearance: Normal appearance. She is not ill-appearing.   HENT:      Head: Normocephalic.   Cardiovascular:      Rate and Rhythm: Normal rate and regular rhythm.      Heart sounds: Normal heart sounds. No murmur.   Pulmonary:      Effort: Pulmonary effort is normal. No respiratory distress.      Breath sounds: Normal breath sounds.   Chest:      Chest wall: No tenderness.   Abdominal:      General: Bowel sounds are normal. There is no distension.      Palpations: Abdomen is soft.      Tenderness: There is no abdominal tenderness. Negative signs include Samuel's sign.      Hernia: No hernia is present.   Skin:     General: Skin is warm.   Neurological:      Mental Status: She is alert and oriented to person, place, and time.   Psychiatric:         Mood and Affect: Mood normal.         Behavior: Behavior normal.         Routine labs:  Lab Results   Component Value Date    WBC 7.63 08/14/2020    HGB 13.6 08/14/2020    HCT 40.7 08/14/2020    MCV 86 08/14/2020     08/14/2020     No results found for: INR  No results found for: IRON, FERRITIN, TIBC, FESATURATED  Lab Results   Component " Value Date     08/14/2020    K 4.0 08/14/2020     08/14/2020    CO2 28 08/14/2020    BUN 10 08/14/2020    CREATININE 0.7 08/14/2020     Lab Results   Component Value Date    ALBUMIN 3.8 08/14/2020    ALT 17 08/14/2020    AST 19 08/14/2020    ALKPHOS 46 (L) 08/14/2020    BILITOT 0.4 08/14/2020     No results found for: GLUCOSE  Lab Results   Component Value Date    TSH 3.486 08/14/2020     Lab Results   Component Value Date    CALCIUM 9.4 08/14/2020       Imaging:      I have reviewed prior labs, imaging, and notes.      Assessment:  1. Gastroesophageal reflux disease, unspecified whether esophagitis present    2. Epigastric pain    3. Abdominal bloating    4. Constipation, unspecified constipation type        Plan:  Orders Placed This Encounter    COVID-19 Routine Screening    Case request GI: EGD (ESOPHAGOGASTRODUODENOSCOPY)     EGD. Consider biopsy for H.pylori  Continue Protonix 20mg daily. Take 30 minutes before first meal of the day.  Continue stool softener as needed for constipation    Consider increasing Protonix dose to 40mg daily pending EGD results.     Plan of care discussed with patient who is in agreement and verbalized understanding.           JAM Kyle,FNP-BC  [facility]

## 2020-11-19 ENCOUNTER — TELEPHONE (OUTPATIENT)
Dept: ENDOSCOPY | Facility: HOSPITAL | Age: 55
End: 2020-11-19

## 2020-11-19 NOTE — TELEPHONE ENCOUNTER
Left message instructing patient to call dept @ [phone] between 8am-4pm.    Arrival time to be given @ 0700  EGD  (Message sent via [facility] portal)

## 2020-11-19 NOTE — TELEPHONE ENCOUNTER
Spoke with patient about arrival time @ 0700.   Covid test = pnd    NPO status reviewed: Patient must have nothing to eat after midnight.   Medications: Do not take Insulin or oral diabetic medications the day of the procedure.  Take as prescribed: heart, seizure and blood pressure medication in the morning with a sip of water (less than an ounce).  Take any breathing medications and bring inhalers to hospital with you Leave all valuables and jewelry at home.     Wear comfortable clothes to procedure to change into hospital gown You cannot drive for 24 hours after your procedure because you will receive sedation for your procedure to make you comfortable.  A ride must be provided at discharge.

## 2020-11-20 ENCOUNTER — LAB VISIT (OUTPATIENT)
Dept: FAMILY MEDICINE | Facility: CLINIC | Age: 55
End: 2020-11-20
Payer: COMMERCIAL

## 2020-11-20 DIAGNOSIS — K21.9 GASTROESOPHAGEAL REFLUX DISEASE, UNSPECIFIED WHETHER ESOPHAGITIS PRESENT: ICD-10-CM

## 2020-11-20 PROCEDURE — U0003 INFECTIOUS AGENT DETECTION BY NUCLEIC ACID (DNA OR RNA); SEVERE ACUTE RESPIRATORY SYNDROME CORONAVIRUS 2 (SARS-COV-2) (CORONAVIRUS DISEASE [COVID-19]), AMPLIFIED PROBE TECHNIQUE, MAKING USE OF HIGH THROUGHPUT TECHNOLOGIES AS DESCRIBED BY CMS-2020-01-R: HCPCS

## 2020-11-21 LAB — SARS-COV-2 RNA RESP QL NAA+PROBE: NOT DETECTED

## 2020-11-23 ENCOUNTER — ANESTHESIA EVENT (OUTPATIENT)
Dept: ENDOSCOPY | Facility: HOSPITAL | Age: 55
End: 2020-11-23
Payer: COMMERCIAL

## 2020-11-23 ENCOUNTER — ANESTHESIA (OUTPATIENT)
Dept: ENDOSCOPY | Facility: HOSPITAL | Age: 55
End: 2020-11-23
Payer: COMMERCIAL

## 2020-11-23 ENCOUNTER — PATIENT MESSAGE (OUTPATIENT)
Dept: GASTROENTEROLOGY | Facility: CLINIC | Age: 55
End: 2020-11-23

## 2020-11-23 ENCOUNTER — HOSPITAL ENCOUNTER (OUTPATIENT)
Facility: HOSPITAL | Age: 55
Discharge: HOME | End: 2020-11-23
Attending: INTERNAL MEDICINE | Admitting: INTERNAL MEDICINE
Payer: COMMERCIAL

## 2020-11-23 VITALS
WEIGHT: 160 LBS | TEMPERATURE: 98 F | DIASTOLIC BLOOD PRESSURE: 88 MMHG | OXYGEN SATURATION: 100 % | HEART RATE: 68 BPM | HEIGHT: 63 IN | RESPIRATION RATE: 18 BRPM | SYSTOLIC BLOOD PRESSURE: 130 MMHG | BODY MASS INDEX: 28.35 KG/M2

## 2020-11-23 DIAGNOSIS — K21.9 GERD (GASTROESOPHAGEAL REFLUX DISEASE): ICD-10-CM

## 2020-11-23 LAB
B-HCG UR QL: NEGATIVE
CTP QC/QA: YES

## 2020-11-23 PROCEDURE — 88305 TISSUE EXAM BY PATHOLOGIST: CPT | Mod: 26,,, | Performed by: PATHOLOGY

## 2020-11-23 PROCEDURE — 63600175 PHARM REV CODE 636 W HCPCS: Performed by: NURSE ANESTHETIST, CERTIFIED REGISTERED

## 2020-11-23 PROCEDURE — 88305 TISSUE EXAM BY PATHOLOGIST: CPT | Performed by: PATHOLOGY

## 2020-11-23 PROCEDURE — 88342 IMHCHEM/IMCYTCHM 1ST ANTB: CPT | Performed by: PATHOLOGY

## 2020-11-23 PROCEDURE — 37000008 HC ANESTHESIA 1ST 15 MINUTES: Performed by: INTERNAL MEDICINE

## 2020-11-23 PROCEDURE — 25000003 PHARM REV CODE 250: Performed by: INTERNAL MEDICINE

## 2020-11-23 PROCEDURE — 43239 EGD BIOPSY SINGLE/MULTIPLE: CPT | Performed by: INTERNAL MEDICINE

## 2020-11-23 PROCEDURE — 81025 URINE PREGNANCY TEST: CPT | Performed by: INTERNAL MEDICINE

## 2020-11-23 PROCEDURE — 88305 TISSUE EXAM BY PATHOLOGIST: ICD-10-PCS | Mod: 26,,, | Performed by: PATHOLOGY

## 2020-11-23 PROCEDURE — 43248 EGD GUIDE WIRE INSERTION: CPT | Mod: ,,, | Performed by: INTERNAL MEDICINE

## 2020-11-23 PROCEDURE — 88342 IMHCHEM/IMCYTCHM 1ST ANTB: CPT | Mod: 26,,, | Performed by: PATHOLOGY

## 2020-11-23 PROCEDURE — 27201012 HC FORCEPS, HOT/COLD, DISP: Performed by: INTERNAL MEDICINE

## 2020-11-23 PROCEDURE — 43248 PR EGD, FLEX, W/DILATION OVER GUIDEWIRE: ICD-10-PCS | Mod: ,,, | Performed by: INTERNAL MEDICINE

## 2020-11-23 PROCEDURE — 43239 PR EGD, FLEX, W/BIOPSY, SGL/MULTI: ICD-10-PCS | Mod: 59,,, | Performed by: INTERNAL MEDICINE

## 2020-11-23 PROCEDURE — 25000003 PHARM REV CODE 250: Performed by: NURSE ANESTHETIST, CERTIFIED REGISTERED

## 2020-11-23 PROCEDURE — 43239 EGD BIOPSY SINGLE/MULTIPLE: CPT | Mod: 59,,, | Performed by: INTERNAL MEDICINE

## 2020-11-23 PROCEDURE — 43248 EGD GUIDE WIRE INSERTION: CPT | Performed by: INTERNAL MEDICINE

## 2020-11-23 PROCEDURE — 88342 CHG IMMUNOCYTOCHEMISTRY: ICD-10-PCS | Mod: 26,,, | Performed by: PATHOLOGY

## 2020-11-23 PROCEDURE — 37000009 HC ANESTHESIA EA ADD 15 MINS: Performed by: INTERNAL MEDICINE

## 2020-11-23 PROCEDURE — C1769 GUIDE WIRE: HCPCS | Performed by: INTERNAL MEDICINE

## 2020-11-23 RX ORDER — SODIUM CHLORIDE 0.9 % (FLUSH) 0.9 %
10 SYRINGE (ML) INJECTION
Status: DISCONTINUED | OUTPATIENT
Start: 2020-11-23 | End: 2020-11-23 | Stop reason: HOSPADM

## 2020-11-23 RX ORDER — SODIUM CHLORIDE 9 MG/ML
INJECTION, SOLUTION INTRAVENOUS CONTINUOUS
Status: DISCONTINUED | OUTPATIENT
Start: 2020-11-23 | End: 2020-11-23 | Stop reason: HOSPADM

## 2020-11-23 RX ORDER — LIDOCAINE HYDROCHLORIDE 20 MG/ML
INJECTION INTRAVENOUS
Status: DISCONTINUED | OUTPATIENT
Start: 2020-11-23 | End: 2020-11-23

## 2020-11-23 RX ORDER — PROPOFOL 10 MG/ML
VIAL (ML) INTRAVENOUS
Status: DISCONTINUED | OUTPATIENT
Start: 2020-11-23 | End: 2020-11-23

## 2020-11-23 RX ORDER — FENTANYL CITRATE 50 UG/ML
INJECTION, SOLUTION INTRAMUSCULAR; INTRAVENOUS
Status: DISCONTINUED | OUTPATIENT
Start: 2020-11-23 | End: 2020-11-23

## 2020-11-23 RX ADMIN — PROPOFOL 20 MG: 10 INJECTION, EMULSION INTRAVENOUS at 08:11

## 2020-11-23 RX ADMIN — FENTANYL CITRATE 100 MCG: 50 INJECTION, SOLUTION INTRAMUSCULAR; INTRAVENOUS at 08:11

## 2020-11-23 RX ADMIN — PROPOFOL 30 MG: 10 INJECTION, EMULSION INTRAVENOUS at 08:11

## 2020-11-23 RX ADMIN — LIDOCAINE HYDROCHLORIDE 100 MG: 20 INJECTION, SOLUTION INTRAVENOUS at 08:11

## 2020-11-23 RX ADMIN — PROPOFOL 70 MG: 10 INJECTION, EMULSION INTRAVENOUS at 08:11

## 2020-11-23 RX ADMIN — SODIUM CHLORIDE: 0.9 INJECTION, SOLUTION INTRAVENOUS at 07:11

## 2020-11-23 NOTE — [employer] PATIENT INSTRUCTIONS
Discharge Summary/Instructions after an Endoscopic Procedure  Patient Name: Mary Alice Garcia  Patient MRN: [id number]  Patient YOB: 1965 Monday, November 23, 2020  Rio Galeano MD  Your health is very important to us during the Covid Crisis. Following your   procedure today, you will receive a daily text for 2 weeks asking about   signs or symptoms of Covid 19.  Please respond to this text when you   receive it so we can follow up and keep you as safe as possible.   RESTRICTIONS:  During your procedure today, you received medications for sedation.  These   medications may affect your judgment, balance and coordination.  Therefore,   for 24 hours, you have the following restrictions:   - DO NOT drive a car, operate machinery, make legal/financial decisions,   sign important papers or drink alcohol.    ACTIVITY:  Today: no heavy lifting, straining or running due to procedural   sedation/anesthesia.  The following day: return to full activity including work.  DIET:  Eat and drink normally unless instructed otherwise.     TREATMENT FOR COMMON SIDE EFFECTS:  - Mild abdominal pain, nausea, belching, bloating or excessive gas:  rest,   eat lightly and use a heating pad.  - Sore Throat: treat with throat lozenges and/or gargle with warm salt   water.  - Because air was used during the procedure, expelling large amounts of air   from your rectum or belching is normal.  - If a bowel prep was taken, you may not have a bowel movement for 1-3 days.    This is normal.  SYMPTOMS TO WATCH FOR AND REPORT TO YOUR PHYSICIAN:  1. Abdominal pain or bloating, other than gas cramps.  2. Chest pain.  3. Back pain.  4. Signs of infection such as: chills or fever occurring within 24 hours   after the procedure.  5. Rectal bleeding, which would show as bright red, maroon, or black stools.   (A tablespoon of blood from the rectum is not serious, especially if   hemorrhoids are present.)  6. Vomiting.  7. Weakness or dizziness.  GO  DIRECTLY TO THE NEAREST EMERGENCY ROOM IF YOU HAVE ANY OF THE FOLLOWING:      Difficulty breathing              Chills and/or fever over 101 F   Persistent vomiting and/or vomiting blood   Severe abdominal pain   Severe chest pain   Black, tarry stools   Bleeding- more than one tablespoon   Any other symptom or condition that you feel may need urgent attention  Your doctor recommends these additional instructions:  If any biopsies were taken, your doctors clinic will contact you in 1 to 2   weeks with any results.  - Discharge patient to home.   - Patient has a contact number available for emergencies.  The signs and   symptoms of potential delayed complications were discussed with the   patient.  Return to normal activities tomorrow.  Written discharge   instructions were provided to the patient.   - Resume previous diet.   - Continue present medications.   - Await pathology results.   - Return to nurse practitioner PRN.  For questions, problems or results please call your physician - Rio Galeano MD.  EMERGENCY PHONE NUMBER: [phone],  LAB RESULTS: [phone]  IF A COMPLICATION OR EMERGENCY SITUATION ARISES AND YOU ARE UNABLE TO REACH   YOUR PHYSICIAN - GO DIRECTLY TO THE EMERGENCY ROOM.  Rio Galeano MD  11/23/2020 8:37:58 AM  This report has been verified and signed electronically.  [employer]

## 2020-11-23 NOTE — ANESTHESIA POSTPROCEDURE EVALUATION
Anesthesia Post Evaluation    Patient: Mary Alice Garcia    Procedure(s) Performed: Procedure(s) (LRB):  EGD (ESOPHAGOGASTRODUODENOSCOPY) (N/A)    Final Anesthesia Type: MAC    Patient location during evaluation: GI PACU  Patient participation: Yes- Able to Participate  Level of consciousness: awake and alert and oriented  Post-procedure vital signs: reviewed and stable  Pain management: adequate  Airway patency: patent    PONV status at discharge: No PONV  Anesthetic complications: no      Cardiovascular status: blood pressure returned to baseline, hemodynamically stable and stable  Respiratory status: unassisted, spontaneous ventilation and room air  Hydration status: euvolemic  Follow-up not needed.          Vitals Value Taken Time   /80 11/23/20 0748   Temp 36.1 °C (97 °F) 11/23/20 0748   Pulse 94 11/23/20 0748   Resp 18 11/23/20 0748   SpO2 99 % 11/23/20 0748         No case tracking events are documented in the log.      Pain/Nahomy Score: No data recorded

## 2020-11-23 NOTE — TELEPHONE ENCOUNTER
Spoke with patient.  She went home from procedure and had macaroni and cheese and breakfast sausage and then a few hours later ate toasters strudel and drank milk.  She is now c/o epigastric burning.  Pain stated after eating the macaroni and sausage and worsened after second meal.  She did not take her Protonix today.  Advised patient to take her protonix today and resume taking it tomorrow in the morning as she regularly takes it.  Also recommend bland foods for today and over the next two days. Sent lifestyle modifications for reflux to patient via portal. Patient verbalized understanding.

## 2020-11-23 NOTE — TRANSFER OF CARE
"Anesthesia Transfer of Care Note    Patient: Mary Alice Garcia    Procedure(s) Performed: Procedure(s) (LRB):  EGD (ESOPHAGOGASTRODUODENOSCOPY) (N/A)    Patient location: GI    Anesthesia Type: MAC    Transport from OR: Transported from OR on room air with adequate spontaneous ventilation    Post pain: adequate analgesia    Post assessment: no apparent anesthetic complications and tolerated procedure well    Post vital signs: stable    Level of consciousness: awake, alert and oriented    Nausea/Vomiting: no nausea/vomiting    Complications: none    Transfer of care protocol was followed      Last vitals:   Visit Vitals  /80 (BP Location: Left arm, Patient Position: Lying)   Pulse 94   Temp 36.1 °C (97 °F) (Skin)   Resp 18   Ht 5' 3" (1.6 m)   Wt 72.6 kg (160 lb)   SpO2 99%   Breastfeeding No   BMI 28.34 kg/m²     "

## 2020-11-23 NOTE — ANESTHESIA PREPROCEDURE EVALUATION
2020  Mary Alice Garcia is a 55 y.o., female for EGD under MAC    Past Medical History:   Diagnosis Date    Arthritis     Depression     Hyperlipidemia     Hypertension     2013    Syncope and collapse     Thyroid disease     Tuberculosis      Past Surgical History:   Procedure Laterality Date     SECTION      HEMORRHOID SURGERY      KNEE SURGERY      right knee    thyroid removed      TONSILLECTOMY      TUBAL LIGATION           Anesthesia Evaluation    I have reviewed the Patient Summary Reports.   I have reviewed the NPO Status.   I have reviewed the Medications.     Review of Systems  Social:  Non-Smoker        Physical Exam  General:  Well nourished    Airway/Jaw/Neck:  Airway Findings: Mallampati: II      Chest/Lungs:  Chest/Lungs Clear    Heart/Vascular:  Heart Findings: Normal            Anesthesia Plan  Type of Anesthesia, risks & benefits discussed:  Anesthesia Type:  MAC  Patient's Preference:   Intra-op Monitoring Plan: standard ASA monitors  Intra-op Monitoring Plan Comments:   Post Op Pain Control Plan: multimodal analgesia  Post Op Pain Control Plan Comments:   Induction:    Beta Blocker:  Patient is not currently on a Beta-Blocker (No further documentation required).       Informed Consent: Patient understands risks and agrees with Anesthesia plan.  Questions answered. Anesthesia consent signed with patient.  ASA Score: 2     Day of Surgery Review of History & Physical:            Ready For Surgery From Anesthesia Perspective.

## 2020-11-27 ENCOUNTER — PATIENT MESSAGE (OUTPATIENT)
Dept: GASTROENTEROLOGY | Facility: CLINIC | Age: 55
End: 2020-11-27

## 2020-11-30 ENCOUNTER — PATIENT MESSAGE (OUTPATIENT)
Dept: GASTROENTEROLOGY | Facility: CLINIC | Age: 55
End: 2020-11-30

## 2020-11-30 RX ORDER — PANTOPRAZOLE SODIUM 40 MG/1
40 TABLET, DELAYED RELEASE ORAL DAILY
Qty: 30 TABLET | Refills: 12 | Status: SHIPPED | OUTPATIENT
Start: 2020-11-30 | End: 2023-07-11

## 2020-12-02 ENCOUNTER — LAB VISIT (OUTPATIENT)
Dept: LAB | Facility: HOSPITAL | Age: 55
End: 2020-12-02
Attending: INTERNAL MEDICINE
Payer: COMMERCIAL

## 2020-12-02 DIAGNOSIS — E03.9 HYPOTHYROIDISM, UNSPECIFIED TYPE: ICD-10-CM

## 2020-12-02 LAB
T4 FREE SERPL-MCNC: 0.98 NG/DL (ref 0.71–1.51)
TSH SERPL DL<=0.005 MIU/L-ACNC: 12.84 UIU/ML (ref 0.4–4)

## 2020-12-02 PROCEDURE — 84439 ASSAY OF FREE THYROXINE: CPT

## 2020-12-02 PROCEDURE — 84443 ASSAY THYROID STIM HORMONE: CPT

## 2020-12-02 PROCEDURE — 36415 COLL VENOUS BLD VENIPUNCTURE: CPT

## 2020-12-03 ENCOUNTER — PATIENT MESSAGE (OUTPATIENT)
Dept: GASTROENTEROLOGY | Facility: CLINIC | Age: 55
End: 2020-12-03

## 2020-12-03 LAB
FINAL PATHOLOGIC DIAGNOSIS: NORMAL
GROSS: NORMAL
Lab: NORMAL
SUPPLEMENTAL DIAGNOSIS: NORMAL

## 2020-12-04 ENCOUNTER — TELEPHONE (OUTPATIENT)
Dept: GASTROENTEROLOGY | Facility: CLINIC | Age: 55
End: 2020-12-04

## 2020-12-04 NOTE — TELEPHONE ENCOUNTER
Spoke with patient. Biopsy was negative. Discussed results with patient. Continue Protonix 40mg daily.  Recommend follow up in 3 months. Patient verbalized understanding.

## 2020-12-06 ENCOUNTER — PATIENT OUTREACH (OUTPATIENT)
Dept: ADMINISTRATIVE | Facility: OTHER | Age: 55
End: 2020-12-06

## 2020-12-06 DIAGNOSIS — Z12.11 ENCOUNTER FOR FIT (FECAL IMMUNOCHEMICAL TEST) SCREENING: Primary | ICD-10-CM

## 2020-12-07 ENCOUNTER — TELEPHONE (OUTPATIENT)
Dept: ENDOCRINOLOGY | Facility: CLINIC | Age: 55
End: 2020-12-07

## 2020-12-07 NOTE — TELEPHONE ENCOUNTER
----- Message from Estela Nix sent at 12/7/2020  8:30 AM CST -----  Contact: MARY ALICE NICE [[id number]]  Type:  Patient Requesting Call    Who Called: Mary Alice Brown Call Back Number: [phone]  Additional Information:  requesting call back in regards to pt's thyroid levels

## 2020-12-07 NOTE — PROGRESS NOTES
Care Everywhere: updated  Immunization: updated  Health Maintenance: updated  Media Review: review for outside colon cancer report  Legacy Review:   Order placed: fit kit   Upcoming appts:

## 2020-12-16 ENCOUNTER — PATIENT MESSAGE (OUTPATIENT)
Dept: GASTROENTEROLOGY | Facility: CLINIC | Age: 55
End: 2020-12-16

## 2020-12-21 ENCOUNTER — TELEPHONE (OUTPATIENT)
Dept: ENDOCRINOLOGY | Facility: CLINIC | Age: 55
End: 2020-12-21

## 2020-12-21 NOTE — PROGRESS NOTES
Subjective:      Patient ID: Mary Alice Garcia is a 55 y.o. female.    Chief Complaint:  No chief complaint on file.    History of Present Illness  Mary Alice Garcia is here for follow up of hypothyroidism.  Previously seen by Dr. Mills.  Last seen 6/1/2020.  This is their first visit with me.    This is a [employer] video visit.    The patient location is: [place]  The chief complaint leading to consultation is: hypothyroidism  Visit type: Virtual visit with synchronous audio and video  Total time spent with patient: see below  Each patient to whom he or she provides medical services by telemedicine is:  (1) informed of the relationship between the physician and patient and the respective role of any other health care provider with respect to management of the patient; and (2) notified that he or she may decline to receive medical services by telemedicine and may withdraw from such care at any time.    With regards to hypothyroidism:    She was found a thyroid nodule in 2007 and had growth of nodule so underwent total thyroidectomy in 2009 at [facility], reportedly benign pathology.    Lab Results   Component Value Date    TSH 12.838 (H) 12/02/2020    FREET4 0.98 12/02/2020     Current Medication:  LT4 112mcg daily    Takes thyroid medication properly without food first thing in the morning.    Current Symptoms:   + Weight gain  + Fatigue   - Constipation   + Hair loss  - Brittle nails  - Mental fog   - CP or sob  + Palpitations - rare  - Heat intolerance  + Cold intolerance      Review of Systems   Constitutional: Positive for fatigue and unexpected weight change (weight gain).   Eyes: Negative for visual disturbance.   Respiratory: Negative for shortness of breath.    Cardiovascular: Negative for chest pain.   Gastrointestinal: Negative for abdominal pain.   Endocrine: Positive for cold intolerance.   Musculoskeletal: Negative for arthralgias.   Skin: Negative for wound.   Neurological: Negative for headaches.    Hematological: Does not bruise/bleed easily.   Psychiatric/Behavioral: Negative for sleep disturbance.       Still having regular menstrual cycles.     There were no vitals taken for this visit.    There is no height or weight on file to calculate BMI.    Lab Review:   Lab Results   Component Value Date    HGBA1C 5.6 08/14/2020       Lab Results   Component Value Date    CHOL 202 (H) 08/14/2020    HDL 45 08/14/2020    LDLCALC 135.6 08/14/2020    TRIG 107 08/14/2020    CHOLHDL 22.3 08/14/2020     Lab Results   Component Value Date     08/14/2020    K 4.0 08/14/2020     08/14/2020    CO2 28 08/14/2020     08/14/2020    BUN 10 08/14/2020    CREATININE 0.7 08/14/2020    CALCIUM 9.4 08/14/2020    PROT 7.7 08/14/2020    ALBUMIN 3.8 08/14/2020    BILITOT 0.4 08/14/2020    ALKPHOS 46 (L) 08/14/2020    AST 19 08/14/2020    ALT 17 08/14/2020    ANIONGAP 10 08/14/2020    ESTGFRAFRICA >60 08/14/2020    EGFRNONAA >60 08/14/2020    TSH 12.838 (H) 12/02/2020     Vit D, 25-Hydroxy   Date Value Ref Range Status   08/14/2020 62 30 - 96 ng/mL Final     Comment:     Vitamin D deficiency.........<10 ng/mL                              Vitamin D insufficiency......10-29 ng/mL       Vitamin D sufficiency........> or equal to 30 ng/mL  Vitamin D toxicity............>100 ng/mL       Assessment and Plan     1. Acquired hypothyroidism  levothyroxine (SYNTHROID) 125 MCG tablet    TSH   2. Essential hypertension         Hypothyroid  -- Labs in 6 weeks.  -- Medication Changes:   CHANGE:  LT4 125mcg daily.  -- Clinically and biochemically hypothyroid.  -- Goal is a normal TSH.  -- Avoid exogenous hyperthyroidism as this can accelerate bone loss and increase risk of CV complications.  -- Advised to take LT4 on an empty stomach with water and to wait 30-45 minutes before eating or taking other medications   -- Reviewed usual times of thyroid hormone changes  -- Reviewed that symptoms of hypothyroidism may not correlate with tsh,  and a normal TSH is the goal of therapy. Symptoms are not a justification for over treatment.    Essential hypertension  -- BP goals discussed.      Follow up in about 1 year (around 12/22/2021).    I spent 18 minutes face-to-face with the patient, over half of the visit was spent on counseling and/or coordinating the care of the patient.    Counseling includes:  Diagnostic results, impressions, recommendations   Prognosis   Risk and benefits of management/treatment options   Instructions for management treatment and or follow-up   Importance of compliance with management   Risk factor reduction   Patient education

## 2020-12-22 ENCOUNTER — OFFICE VISIT (OUTPATIENT)
Dept: ENDOCRINOLOGY | Facility: CLINIC | Age: 55
End: 2020-12-22
Payer: COMMERCIAL

## 2020-12-22 ENCOUNTER — PATIENT MESSAGE (OUTPATIENT)
Dept: ENDOCRINOLOGY | Facility: CLINIC | Age: 55
End: 2020-12-22

## 2020-12-22 DIAGNOSIS — E03.9 ACQUIRED HYPOTHYROIDISM: Primary | ICD-10-CM

## 2020-12-22 DIAGNOSIS — I10 ESSENTIAL HYPERTENSION: ICD-10-CM

## 2020-12-22 PROCEDURE — 99213 OFFICE O/P EST LOW 20 MIN: CPT | Mod: 95,,, | Performed by: NURSE PRACTITIONER

## 2020-12-22 PROCEDURE — 99213 PR OFFICE/OUTPT VISIT, EST, LEVL III, 20-29 MIN: ICD-10-PCS | Mod: 95,,, | Performed by: NURSE PRACTITIONER

## 2020-12-22 RX ORDER — LEVOTHYROXINE SODIUM 125 UG/1
125 TABLET ORAL
Qty: 30 TABLET | Refills: 11 | Status: SHIPPED | OUTPATIENT
Start: 2020-12-22 | End: 2021-03-24 | Stop reason: SDUPTHER

## 2020-12-22 NOTE — ASSESSMENT & PLAN NOTE
-- Labs in 6 weeks.  -- Medication Changes:   CHANGE:  LT4 125mcg daily.  -- Clinically and biochemically hypothyroid.  -- Goal is a normal TSH.  -- Avoid exogenous hyperthyroidism as this can accelerate bone loss and increase risk of CV complications.  -- Advised to take LT4 on an empty stomach with water and to wait 30-45 minutes before eating or taking other medications   -- Reviewed usual times of thyroid hormone changes  -- Reviewed that symptoms of hypothyroidism may not correlate with tsh, and a normal TSH is the goal of therapy. Symptoms are not a justification for over treatment.

## 2021-01-05 DIAGNOSIS — R06.02 SOB (SHORTNESS OF BREATH): ICD-10-CM

## 2021-01-05 DIAGNOSIS — M54.50 ACUTE BILATERAL LOW BACK PAIN WITHOUT SCIATICA: ICD-10-CM

## 2021-01-05 DIAGNOSIS — K21.9 GASTROESOPHAGEAL REFLUX DISEASE, UNSPECIFIED WHETHER ESOPHAGITIS PRESENT: ICD-10-CM

## 2021-01-05 DIAGNOSIS — F41.9 ANXIETY AND DEPRESSION: ICD-10-CM

## 2021-01-05 DIAGNOSIS — F32.A ANXIETY AND DEPRESSION: ICD-10-CM

## 2021-01-05 DIAGNOSIS — T14.8XXA MUSCLE STRAIN: ICD-10-CM

## 2021-01-05 RX ORDER — METHOCARBAMOL 500 MG/1
TABLET, FILM COATED ORAL
Qty: 40 TABLET | Refills: 0 | OUTPATIENT
Start: 2021-01-05

## 2021-03-24 ENCOUNTER — PATIENT OUTREACH (OUTPATIENT)
Dept: ADMINISTRATIVE | Facility: HOSPITAL | Age: 56
End: 2021-03-24

## 2021-03-24 DIAGNOSIS — E03.9 ACQUIRED HYPOTHYROIDISM: ICD-10-CM

## 2021-03-24 RX ORDER — LEVOTHYROXINE SODIUM 125 UG/1
125 TABLET ORAL
Qty: 30 TABLET | Refills: 6 | Status: SHIPPED | OUTPATIENT
Start: 2021-03-24 | End: 2023-07-11 | Stop reason: DRUGHIGH

## 2021-04-06 ENCOUNTER — PATIENT OUTREACH (OUTPATIENT)
Dept: ADMINISTRATIVE | Facility: HOSPITAL | Age: 56
End: 2021-04-06

## 2021-06-29 ENCOUNTER — PATIENT MESSAGE (OUTPATIENT)
Dept: ADMINISTRATIVE | Facility: HOSPITAL | Age: 56
End: 2021-06-29

## 2021-06-29 ENCOUNTER — PATIENT OUTREACH (OUTPATIENT)
Dept: ADMINISTRATIVE | Facility: HOSPITAL | Age: 56
End: 2021-06-29

## 2023-06-12 ENCOUNTER — OFFICE VISIT (OUTPATIENT)
Dept: OBSTETRICS AND GYNECOLOGY | Facility: CLINIC | Age: 58
End: 2023-06-12
Payer: COMMERCIAL

## 2023-06-12 VITALS
WEIGHT: 154.31 LBS | SYSTOLIC BLOOD PRESSURE: 130 MMHG | DIASTOLIC BLOOD PRESSURE: 84 MMHG | BODY MASS INDEX: 27.34 KG/M2

## 2023-06-12 DIAGNOSIS — Z01.419 GYNECOLOGIC EXAM NORMAL: ICD-10-CM

## 2023-06-12 DIAGNOSIS — R10.2 PELVIC PAIN: ICD-10-CM

## 2023-06-12 DIAGNOSIS — Z12.31 BREAST CANCER SCREENING BY MAMMOGRAM: ICD-10-CM

## 2023-06-12 DIAGNOSIS — N95.1 MENOPAUSAL SYMPTOMS: Primary | ICD-10-CM

## 2023-06-12 DIAGNOSIS — Z80.3 FAMILY HISTORY OF BREAST CANCER IN FIRST DEGREE RELATIVE: ICD-10-CM

## 2023-06-12 PROCEDURE — 3008F PR BODY MASS INDEX (BMI) DOCUMENTED: ICD-10-PCS | Mod: CPTII,S$GLB,, | Performed by: OBSTETRICS & GYNECOLOGY

## 2023-06-12 PROCEDURE — 3079F PR MOST RECENT DIASTOLIC BLOOD PRESSURE 80-89 MM HG: ICD-10-PCS | Mod: CPTII,S$GLB,, | Performed by: OBSTETRICS & GYNECOLOGY

## 2023-06-12 PROCEDURE — 3008F BODY MASS INDEX DOCD: CPT | Mod: CPTII,S$GLB,, | Performed by: OBSTETRICS & GYNECOLOGY

## 2023-06-12 PROCEDURE — 88175 CYTOPATH C/V AUTO FLUID REDO: CPT | Performed by: OBSTETRICS & GYNECOLOGY

## 2023-06-12 PROCEDURE — 3079F DIAST BP 80-89 MM HG: CPT | Mod: CPTII,S$GLB,, | Performed by: OBSTETRICS & GYNECOLOGY

## 2023-06-12 PROCEDURE — 87625 HPV TYPES 16 & 18 ONLY: CPT | Mod: 59 | Performed by: OBSTETRICS & GYNECOLOGY

## 2023-06-12 PROCEDURE — 3075F PR MOST RECENT SYSTOLIC BLOOD PRESS GE 130-139MM HG: ICD-10-PCS | Mod: CPTII,S$GLB,, | Performed by: OBSTETRICS & GYNECOLOGY

## 2023-06-12 PROCEDURE — 99386 PREV VISIT NEW AGE 40-64: CPT | Mod: S$GLB,,, | Performed by: OBSTETRICS & GYNECOLOGY

## 2023-06-12 PROCEDURE — 3075F SYST BP GE 130 - 139MM HG: CPT | Mod: CPTII,S$GLB,, | Performed by: OBSTETRICS & GYNECOLOGY

## 2023-06-12 PROCEDURE — 99999 PR PBB SHADOW E&M-EST. PATIENT-LVL III: CPT | Mod: PBBFAC,,, | Performed by: OBSTETRICS & GYNECOLOGY

## 2023-06-12 PROCEDURE — 1159F MED LIST DOCD IN RCRD: CPT | Mod: CPTII,S$GLB,, | Performed by: OBSTETRICS & GYNECOLOGY

## 2023-06-12 PROCEDURE — 87624 HPV HI-RISK TYP POOLED RSLT: CPT | Performed by: OBSTETRICS & GYNECOLOGY

## 2023-06-12 PROCEDURE — 1159F PR MEDICATION LIST DOCUMENTED IN MEDICAL RECORD: ICD-10-PCS | Mod: CPTII,S$GLB,, | Performed by: OBSTETRICS & GYNECOLOGY

## 2023-06-12 PROCEDURE — 99999 PR PBB SHADOW E&M-EST. PATIENT-LVL III: ICD-10-PCS | Mod: PBBFAC,,, | Performed by: OBSTETRICS & GYNECOLOGY

## 2023-06-12 PROCEDURE — 99386 PR PREVENTIVE VISIT,NEW,40-64: ICD-10-PCS | Mod: S$GLB,,, | Performed by: OBSTETRICS & GYNECOLOGY

## 2023-06-12 RX ORDER — NORETHINDRONE ACETATE AND ETHINYL ESTRADIOL .5; 2.5 MG/1; UG/1
1 TABLET ORAL DAILY
Qty: 30 TABLET | Refills: 0 | Status: SHIPPED | OUTPATIENT
Start: 2023-06-12 | End: 2023-07-26 | Stop reason: SDUPTHER

## 2023-06-12 NOTE — LETTER
June 12, 2023    Mary Alice Garcia  [address] [address]             [facility]  Obstetrics and Gynecology  [address] [place] [address]  Phone: [phone]   June 12, 2023     Patient: Mary Alice Garcia   YOB: 1965   Date of Visit: 6/12/2023       To Whom it May Concern:    Mary Alice Garcia was seen in my clinic on 6/12/2023. She may return to work 06/13/2023.    Please excuse her from any classes or work missed.    If you have any questions or concerns, please don't hesitate to call.    Sincerely,         Piero Velásquez MD

## 2023-06-12 NOTE — PROGRESS NOTES
Subjective:      Patient ID: Mary Alice Garcia is a 58 y.o. female.    Chief Complaint:  Well Woman      History of Present Illness  HPI  Annual Exam-Postmenopausal  Patient presents for annual exam. The patient has no complaints today. The patient is not currently sexually active x 6 months. GYN screening history:  WNL in [place], approx 1 yr ago . The patient is not taking hormone replacement therapy. Patient denies post-menopausal vaginal bleeding. The patient wears seatbelts: yes. The patient participates in regular exercise: no. Has the patient ever been transfused or tattooed?: no. The patient reports that there is not domestic violence in her life.    Pt c/o worsening hot flashes and night sweats.  Pt states sx's are so severe that it is affecting her work by not being well rested.  Pt's mother had breast cancer,  at age 45.  Pt is willing to try HRT due to severity of sx's.    Pt had been having menses until 3/2023.    GYN & OB History  Patient's last menstrual period was 2023.   Date of Last Pap: 10/28/2019    OB History    Para Term  AB Living   3 3 3     3   SAB IAB Ectopic Multiple Live Births           3      # Outcome Date GA Lbr Hi/2nd Weight Sex Delivery Anes PTL Lv   3 Term            2 Term            1 Term                Review of Systems  Review of Systems   Constitutional: Negative.    Respiratory: Negative.     Cardiovascular: Negative.    Gastrointestinal:  Positive for reflux. Negative for abdominal pain, bloating, blood in stool, constipation, diarrhea, nausea, vomiting and fecal incontinence.   Endocrine: Positive for hypothyroidism. Negative for diabetes, hair loss, hot flashes and hyperthyroidism.   Genitourinary:  Positive for hot flashes. Negative for decreased libido, dysmenorrhea, dyspareunia, dysuria, flank pain, frequency, genital sores, hematuria, menorrhagia, menstrual problem, pelvic pain, urgency, vaginal bleeding, vaginal discharge, urinary incontinence,  postcoital bleeding, postmenopausal bleeding, vaginal dryness and vaginal odor.   Musculoskeletal: Negative.    Integumentary:  Negative.   Neurological: Negative.    Hematological: Negative.    Psychiatric/Behavioral: Negative.     Breast: negative.         Objective:     Physical Exam:   Constitutional: She is oriented to person, place, and time. She appears well-developed and well-nourished. No distress.    HENT:   Head: Normocephalic and atraumatic.     Neck: No thyromegaly present.     Pulmonary/Chest: Effort normal. No respiratory distress. Right breast exhibits no inverted nipple, no mass, no nipple discharge, no skin change, no tenderness, presence, no bleeding, no swelling, no mastectomy, no augmentation and no lumpectomy. Left breast exhibits no inverted nipple, no mass, no nipple discharge, no skin change, no tenderness, presence, no bleeding, no swelling, no mastectomy, no augmentation and no lumpectomy. Breasts are symmetrical.        Abdominal: Soft. She exhibits no distension and no mass. There is no abdominal tenderness. There is no rebound and no guarding.     Genitourinary:    Urethra, bladder, vagina, uterus, right adnexa and left adnexa normal.      Pelvic exam was performed with patient in the lithotomy position.   The external female genitalia was normal.   No external genitalia lesions identified,Genitalia hair distrobution normal .   Labial bartholins normal.There is tenderness on the right labia. There is no rash, lesion or injury on the right labia. There is no rash, tenderness, lesion or injury on the left labia. Cervix is normal. No no adexnal prolapse, no nodularity or no masses or organomegaly. Right adnexum displays no mass, no tenderness and no fullness. Left adnexum displays no mass, no tenderness and no fullness. Vagina exhibits no lesion. No erythema,  no vaginal discharge, tenderness, bleeding, rectocele, cystocele or unspecified prolapse of vaginal walls in the vagina.    No  foreign body in the vagina.      No signs of injury in the vagina.   Vagina was moist.Cervix exhibits no motion tenderness, no lesion, no discharge, no friability, no lesion, no tenderness and no polyp.    pap smear completedUterus consistancy normal. and Uerus contour normal  Uterus is not deviated, not enlarged, not fixed, not tender, not hosting fibroids and no uterine prolapse. Normal urethral meatus.Urethral Meatus exhibits: urethral lesion and prolapsedUrethra findings: no urethral mass, no tenderness and no urethral scarringBladder findings: no bladder distention and no bladder tenderness          Musculoskeletal: Normal range of motion and moves all extremeties. No tenderness.       Neurological: She is alert and oriented to person, place, and time. No cranial nerve deficit. Coordination normal.    Skin: She is not diaphoretic.    Psychiatric: She has a normal mood and affect. Her behavior is normal. Judgment and thought content normal.       Assessment:     1. Menopausal symptoms    2. Gynecologic exam normal    3. Pelvic pain    4. Breast cancer screening by mammogram              Plan:      Pelvic sono to assess RLQ tenderness  Pap and HR HPV collected  MMG ordered  Rx Femhrt Lo, po qd x 1 month, re-eval sx's in 1 month

## 2023-06-15 ENCOUNTER — TELEPHONE (OUTPATIENT)
Dept: OBSTETRICS AND GYNECOLOGY | Facility: CLINIC | Age: 58
End: 2023-06-15
Payer: COMMERCIAL

## 2023-06-15 NOTE — TELEPHONE ENCOUNTER
Pt call was return. Pt was informed he order ultrasound and mammogram.          ----- Message from Ale García sent at 6/15/2023 12:57 PM CDT -----  Regarding: Patient Call Back  .Type: Patient Call Back    Who called: Self     What is the request in detail: Requesting a call back in regards to some questions she has. Wants to know if she was supposed to be having an ultrasound on her pelvis or an MRI. Also wants to know if the dr can send something over to her preferred pharmacy for a yeast infection. Please advise.    Can the clinic reply by [facility]? No    Would the patient rather a call back or a response via [facility]? Call back    Best call back number: [phone]     Additional Information:

## 2023-06-29 ENCOUNTER — HOSPITAL ENCOUNTER (OUTPATIENT)
Dept: RADIOLOGY | Facility: HOSPITAL | Age: 58
Discharge: HOME | End: 2023-06-29
Attending: OBSTETRICS & GYNECOLOGY
Payer: COMMERCIAL

## 2023-06-29 DIAGNOSIS — R10.2 PELVIC PAIN: ICD-10-CM

## 2023-06-29 DIAGNOSIS — Z12.31 BREAST CANCER SCREENING BY MAMMOGRAM: ICD-10-CM

## 2023-06-29 LAB
CLINICAL INFO: NORMAL
CYTO CVX: NORMAL
CYTOLOGIST CVX/VAG CYTO: NORMAL
CYTOLOGIST CVX/VAG CYTO: NORMAL
CYTOLOGY CMNT CVX/VAG CYTO-IMP: NORMAL
CYTOLOGY PAP THIN PREP EXPLANATION: NORMAL
DATE OF PREVIOUS PAP: NORMAL
DATE PREVIOUS BX: NO
GEN CATEG CVX/VAG CYTO-IMP: NORMAL
HPV I/H RISK 4 DNA CVX QL NAA+PROBE: DETECTED
HPV16 DNA CVX QL PROBE+SIG AMP: NOT DETECTED
HPV18 DNA CVX QL PROBE+SIG AMP: DETECTED
LMP START DATE: NORMAL
MICROORGANISM CVX/VAG CYTO: NORMAL
PATHOLOGIST CVX/VAG CYTO: NORMAL
SERVICE CMNT-IMP: NORMAL
SPECIMEN SOURCE CVX/VAG CYTO: NORMAL
STAT OF ADQ CVX/VAG CYTO-IMP: NORMAL

## 2023-06-29 PROCEDURE — 76830 TRANSVAGINAL US NON-OB: CPT | Mod: 26,,, | Performed by: RADIOLOGY

## 2023-06-29 PROCEDURE — 77063 BREAST TOMOSYNTHESIS BI: CPT | Mod: 26,,, | Performed by: RADIOLOGY

## 2023-06-29 PROCEDURE — 77067 MAMMO DIGITAL SCREENING BILAT WITH TOMO: ICD-10-PCS | Mod: 26,,, | Performed by: RADIOLOGY

## 2023-06-29 PROCEDURE — 76856 US EXAM PELVIC COMPLETE: CPT | Mod: TC

## 2023-06-29 PROCEDURE — 76856 US EXAM PELVIC COMPLETE: CPT | Mod: 26,,, | Performed by: RADIOLOGY

## 2023-06-29 PROCEDURE — 77063 MAMMO DIGITAL SCREENING BILAT WITH TOMO: ICD-10-PCS | Mod: 26,,, | Performed by: RADIOLOGY

## 2023-06-29 PROCEDURE — 76830 US PELVIS COMP WITH TRANSVAG NON-OB (XPD): ICD-10-PCS | Mod: 26,,, | Performed by: RADIOLOGY

## 2023-06-29 PROCEDURE — 77067 SCR MAMMO BI INCL CAD: CPT | Mod: TC

## 2023-06-29 PROCEDURE — 76856 US PELVIS COMP WITH TRANSVAG NON-OB (XPD): ICD-10-PCS | Mod: 26,,, | Performed by: RADIOLOGY

## 2023-06-29 PROCEDURE — 77067 SCR MAMMO BI INCL CAD: CPT | Mod: 26,,, | Performed by: RADIOLOGY

## 2023-07-03 ENCOUNTER — TELEPHONE (OUTPATIENT)
Dept: OBSTETRICS AND GYNECOLOGY | Facility: CLINIC | Age: 58
End: 2023-07-03
Payer: COMMERCIAL

## 2023-07-03 NOTE — TELEPHONE ENCOUNTER
Left message for pt to call the office. Pt is scheduled for a colp on July 17,2023 at 3 p.m.      ----- Message from Piero Velásquez MD sent at 7/1/2023  3:19 PM CDT -----  Pt needs colp

## 2023-07-11 ENCOUNTER — OFFICE VISIT (OUTPATIENT)
Dept: PRIMARY CARE CLINIC | Facility: CLINIC | Age: 58
End: 2023-07-11
Payer: COMMERCIAL

## 2023-07-11 ENCOUNTER — LAB VISIT (OUTPATIENT)
Dept: LAB | Facility: HOSPITAL | Age: 58
End: 2023-07-11
Attending: NURSE PRACTITIONER
Payer: COMMERCIAL

## 2023-07-11 VITALS
BODY MASS INDEX: 27.1 KG/M2 | WEIGHT: 153 LBS | RESPIRATION RATE: 16 BRPM | DIASTOLIC BLOOD PRESSURE: 86 MMHG | OXYGEN SATURATION: 99 % | SYSTOLIC BLOOD PRESSURE: 134 MMHG | HEART RATE: 89 BPM

## 2023-07-11 DIAGNOSIS — K21.9 GASTROESOPHAGEAL REFLUX DISEASE WITHOUT ESOPHAGITIS: ICD-10-CM

## 2023-07-11 DIAGNOSIS — Z11.59 ENCOUNTER FOR HEPATITIS C SCREENING TEST FOR LOW RISK PATIENT: ICD-10-CM

## 2023-07-11 DIAGNOSIS — Z13.6 ENCOUNTER FOR LIPID SCREENING FOR CARDIOVASCULAR DISEASE: ICD-10-CM

## 2023-07-11 DIAGNOSIS — Z13.1 SCREENING FOR DIABETES MELLITUS: ICD-10-CM

## 2023-07-11 DIAGNOSIS — E03.9 HYPOTHYROIDISM, UNSPECIFIED TYPE: ICD-10-CM

## 2023-07-11 DIAGNOSIS — I10 PRIMARY HYPERTENSION: ICD-10-CM

## 2023-07-11 DIAGNOSIS — Z00.00 ANNUAL PHYSICAL EXAM: Primary | ICD-10-CM

## 2023-07-11 DIAGNOSIS — Z13.220 ENCOUNTER FOR LIPID SCREENING FOR CARDIOVASCULAR DISEASE: ICD-10-CM

## 2023-07-11 DIAGNOSIS — Z00.00 ANNUAL PHYSICAL EXAM: ICD-10-CM

## 2023-07-11 DIAGNOSIS — Z11.4 SCREENING FOR HIV (HUMAN IMMUNODEFICIENCY VIRUS): ICD-10-CM

## 2023-07-11 DIAGNOSIS — Z13.5 SCREENING FOR EYE CONDITION: ICD-10-CM

## 2023-07-11 LAB
ALBUMIN SERPL BCP-MCNC: 4.1 G/DL (ref 3.5–5.2)
ALP SERPL-CCNC: 75 U/L (ref 55–135)
ALT SERPL W/O P-5'-P-CCNC: 27 U/L (ref 10–44)
ANION GAP SERPL CALC-SCNC: 10 MMOL/L (ref 8–16)
AST SERPL-CCNC: 31 U/L (ref 10–40)
BASOPHILS # BLD AUTO: 0.04 K/UL (ref 0–0.2)
BASOPHILS NFR BLD: 0.6 % (ref 0–1.9)
BILIRUB SERPL-MCNC: 0.7 MG/DL (ref 0.1–1)
BUN SERPL-MCNC: 7 MG/DL (ref 6–20)
CALCIUM SERPL-MCNC: 9.6 MG/DL (ref 8.7–10.5)
CHLORIDE SERPL-SCNC: 103 MMOL/L (ref 95–110)
CHOLEST SERPL-MCNC: 218 MG/DL (ref 120–199)
CHOLEST/HDLC SERPL: 4.1 {RATIO} (ref 2–5)
CO2 SERPL-SCNC: 27 MMOL/L (ref 23–29)
CREAT SERPL-MCNC: 0.7 MG/DL (ref 0.5–1.4)
DIFFERENTIAL METHOD: NORMAL
EOSINOPHIL # BLD AUTO: 0.2 K/UL (ref 0–0.5)
EOSINOPHIL NFR BLD: 3.7 % (ref 0–8)
ERYTHROCYTE [DISTWIDTH] IN BLOOD BY AUTOMATED COUNT: 12.3 % (ref 11.5–14.5)
EST. GFR  (NO RACE VARIABLE): >60 ML/MIN/1.73 M^2
ESTIMATED AVG GLUCOSE: 108 MG/DL (ref 68–131)
GLUCOSE SERPL-MCNC: 101 MG/DL (ref 70–110)
HBA1C MFR BLD: 5.4 % (ref 4–5.6)
HCT VFR BLD AUTO: 41.4 % (ref 37–48.5)
HCV AB SERPL QL IA: NORMAL
HDLC SERPL-MCNC: 53 MG/DL (ref 40–75)
HDLC SERPL: 24.3 % (ref 20–50)
HGB BLD-MCNC: 13.6 G/DL (ref 12–16)
HIV 1+2 AB+HIV1 P24 AG SERPL QL IA: NORMAL
IMM GRANULOCYTES # BLD AUTO: 0.01 K/UL (ref 0–0.04)
IMM GRANULOCYTES NFR BLD AUTO: 0.2 % (ref 0–0.5)
LDLC SERPL CALC-MCNC: 136.6 MG/DL (ref 63–159)
LYMPHOCYTES # BLD AUTO: 2.6 K/UL (ref 1–4.8)
LYMPHOCYTES NFR BLD: 39.7 % (ref 18–48)
MCH RBC QN AUTO: 29.4 PG (ref 27–31)
MCHC RBC AUTO-ENTMCNC: 32.9 G/DL (ref 32–36)
MCV RBC AUTO: 90 FL (ref 82–98)
MONOCYTES # BLD AUTO: 0.4 K/UL (ref 0.3–1)
MONOCYTES NFR BLD: 6.3 % (ref 4–15)
NEUTROPHILS # BLD AUTO: 3.2 K/UL (ref 1.8–7.7)
NEUTROPHILS NFR BLD: 49.5 % (ref 38–73)
NONHDLC SERPL-MCNC: 165 MG/DL
NRBC BLD-RTO: 0 /100 WBC
PLATELET # BLD AUTO: 341 K/UL (ref 150–450)
PMV BLD AUTO: 10.8 FL (ref 9.2–12.9)
POTASSIUM SERPL-SCNC: 4.6 MMOL/L (ref 3.5–5.1)
PROT SERPL-MCNC: 8.1 G/DL (ref 6–8.4)
RBC # BLD AUTO: 4.62 M/UL (ref 4–5.4)
SODIUM SERPL-SCNC: 140 MMOL/L (ref 136–145)
T4 FREE SERPL-MCNC: 1.3 NG/DL (ref 0.71–1.51)
TRIGL SERPL-MCNC: 142 MG/DL (ref 30–150)
TSH SERPL DL<=0.005 MIU/L-ACNC: 0.18 UIU/ML (ref 0.4–4)
WBC # BLD AUTO: 6.5 K/UL (ref 3.9–12.7)

## 2023-07-11 PROCEDURE — 86803 HEPATITIS C AB TEST: CPT | Performed by: NURSE PRACTITIONER

## 2023-07-11 PROCEDURE — 83036 HEMOGLOBIN GLYCOSYLATED A1C: CPT | Performed by: NURSE PRACTITIONER

## 2023-07-11 PROCEDURE — 3044F HG A1C LEVEL LT 7.0%: CPT | Mod: CPTII,S$GLB,, | Performed by: NURSE PRACTITIONER

## 2023-07-11 PROCEDURE — 1160F PR REVIEW ALL MEDS BY PRESCRIBER/CLIN PHARMACIST DOCUMENTED: ICD-10-PCS | Mod: CPTII,S$GLB,, | Performed by: NURSE PRACTITIONER

## 2023-07-11 PROCEDURE — 3044F PR MOST RECENT HEMOGLOBIN A1C LEVEL <7.0%: ICD-10-PCS | Mod: CPTII,S$GLB,, | Performed by: NURSE PRACTITIONER

## 2023-07-11 PROCEDURE — 3008F BODY MASS INDEX DOCD: CPT | Mod: CPTII,S$GLB,, | Performed by: NURSE PRACTITIONER

## 2023-07-11 PROCEDURE — 3079F DIAST BP 80-89 MM HG: CPT | Mod: CPTII,S$GLB,, | Performed by: NURSE PRACTITIONER

## 2023-07-11 PROCEDURE — 1160F RVW MEDS BY RX/DR IN RCRD: CPT | Mod: CPTII,S$GLB,, | Performed by: NURSE PRACTITIONER

## 2023-07-11 PROCEDURE — 3075F PR MOST RECENT SYSTOLIC BLOOD PRESS GE 130-139MM HG: ICD-10-PCS | Mod: CPTII,S$GLB,, | Performed by: NURSE PRACTITIONER

## 2023-07-11 PROCEDURE — 80053 COMPREHEN METABOLIC PANEL: CPT | Performed by: NURSE PRACTITIONER

## 2023-07-11 PROCEDURE — 3008F PR BODY MASS INDEX (BMI) DOCUMENTED: ICD-10-PCS | Mod: CPTII,S$GLB,, | Performed by: NURSE PRACTITIONER

## 2023-07-11 PROCEDURE — 99396 PREV VISIT EST AGE 40-64: CPT | Mod: S$GLB,,, | Performed by: NURSE PRACTITIONER

## 2023-07-11 PROCEDURE — 87389 HIV-1 AG W/HIV-1&-2 AB AG IA: CPT | Performed by: NURSE PRACTITIONER

## 2023-07-11 PROCEDURE — 84439 ASSAY OF FREE THYROXINE: CPT | Performed by: NURSE PRACTITIONER

## 2023-07-11 PROCEDURE — 1159F MED LIST DOCD IN RCRD: CPT | Mod: CPTII,S$GLB,, | Performed by: NURSE PRACTITIONER

## 2023-07-11 PROCEDURE — 3079F PR MOST RECENT DIASTOLIC BLOOD PRESSURE 80-89 MM HG: ICD-10-PCS | Mod: CPTII,S$GLB,, | Performed by: NURSE PRACTITIONER

## 2023-07-11 PROCEDURE — 84443 ASSAY THYROID STIM HORMONE: CPT | Performed by: NURSE PRACTITIONER

## 2023-07-11 PROCEDURE — 99999 PR PBB SHADOW E&M-EST. PATIENT-LVL IV: CPT | Mod: PBBFAC,,, | Performed by: NURSE PRACTITIONER

## 2023-07-11 PROCEDURE — 1159F PR MEDICATION LIST DOCUMENTED IN MEDICAL RECORD: ICD-10-PCS | Mod: CPTII,S$GLB,, | Performed by: NURSE PRACTITIONER

## 2023-07-11 PROCEDURE — 99396 PR PREVENTIVE VISIT,EST,40-64: ICD-10-PCS | Mod: S$GLB,,, | Performed by: NURSE PRACTITIONER

## 2023-07-11 PROCEDURE — 80061 LIPID PANEL: CPT | Performed by: NURSE PRACTITIONER

## 2023-07-11 PROCEDURE — 3075F SYST BP GE 130 - 139MM HG: CPT | Mod: CPTII,S$GLB,, | Performed by: NURSE PRACTITIONER

## 2023-07-11 PROCEDURE — 85025 COMPLETE CBC W/AUTO DIFF WBC: CPT | Performed by: NURSE PRACTITIONER

## 2023-07-11 PROCEDURE — 99999 PR PBB SHADOW E&M-EST. PATIENT-LVL IV: ICD-10-PCS | Mod: PBBFAC,,, | Performed by: NURSE PRACTITIONER

## 2023-07-11 PROCEDURE — 36415 COLL VENOUS BLD VENIPUNCTURE: CPT | Performed by: NURSE PRACTITIONER

## 2023-07-11 RX ORDER — PROPRANOLOL HYDROCHLORIDE 60 MG/1
60 TABLET ORAL 2 TIMES DAILY
COMMUNITY
Start: 2023-01-27 | End: 2023-07-11 | Stop reason: SDUPTHER

## 2023-07-11 RX ORDER — LEVOTHYROXINE SODIUM 150 UG/1
150 TABLET ORAL
Qty: 90 TABLET | Refills: 3 | Status: SHIPPED | OUTPATIENT
Start: 2023-07-11 | End: 2023-07-17 | Stop reason: DRUGHIGH

## 2023-07-11 RX ORDER — IBUPROFEN 800 MG/1
TABLET ORAL
COMMUNITY
Start: 2023-06-24 | End: 2023-08-29 | Stop reason: ALTCHOICE

## 2023-07-11 RX ORDER — PROPRANOLOL HYDROCHLORIDE 60 MG/1
60 TABLET ORAL 2 TIMES DAILY
Qty: 180 TABLET | Refills: 3 | Status: SHIPPED | OUTPATIENT
Start: 2023-07-11 | End: 2023-10-17 | Stop reason: SDUPTHER

## 2023-07-11 NOTE — PROGRESS NOTES
[facility] Note    Chief Complaint      Chief Complaint   Patient presents with    Annual Exam     History of Present Illness      Mary Alice Garcia is a 58 y.o. female patient with chronic conditions of HTN, hypothyroid, GERD who presents today for annual exam.  Patient feeling well no complaints, denies shortness of breath or chest pain, reviewed meds and history patient.  No concerns of bowel or bladder dysfunction, stays hydrated and active.  Moved back to  from [place] recently    Comes appointment alone.  Practice take measures and is wearing her seatbelt.    Labs ordered  Eye refer  Gyn- Dr. Velásquez- utd, last seen 23  Mammogram- utd, 23  Colonoscopy- declines at this time      Vaccines:  Covid- does not get  Flu shot- does not get  Tdap- goes not get    New problem:  Headaches- has been drinking garlic water daily, which has been helping with HA and BP    Health Maintenance   Topic Date Due    Mammogram  2020    TETANUS VACCINE  2024 (Originally 1985)    Lipid Panel  2028    Hepatitis C Screening  Completed       Past Medical History:   Diagnosis Date    Arthritis     Depression     Hyperlipidemia     Hypertension     2013    Syncope and collapse     Thyroid disease     Tuberculosis        Past Surgical History:   Procedure Laterality Date    BUNIONECTOMY Left      SECTION      ESOPHAGOGASTRODUODENOSCOPY  2020    ESOPHAGOGASTRODUODENOSCOPY N/A 2020    Procedure: EGD (ESOPHAGOGASTRODUODENOSCOPY);  Surgeon: Rio Galeano MD;  Location: [facility];  Service: Endoscopy;  Laterality: N/A;    HEMORRHOID SURGERY      KNEE SURGERY      right knee    thyroid removed      TONSILLECTOMY      TUBAL LIGATION         family history includes Breast cancer in her mother and sister; Cancer in her maternal aunt and mother; Diabetes in her maternal grandmother and paternal grandmother; Drug abuse in her maternal aunt; Hyperlipidemia in her father;  Hypertension in her brother and mother.    Social History     Tobacco Use    Smoking status: Never    Smokeless tobacco: Never   Substance Use Topics    Alcohol use: Yes     Alcohol/week: 8.0 standard drinks     Types: 3 Glasses of wine, 5 Cans of beer per week    Drug use: No       Review of Systems   Constitutional:  Negative for chills and fever.   HENT:  Negative for congestion, hearing loss, sinus pain and sore throat.    Eyes:  Negative for blurred vision and discharge.   Respiratory:  Negative for cough, shortness of breath and wheezing.    Cardiovascular:  Negative for chest pain, palpitations and leg swelling.   Gastrointestinal:  Negative for abdominal pain, blood in stool, constipation, diarrhea, nausea and vomiting.   Genitourinary:  Negative for dysuria and hematuria.   Musculoskeletal:  Negative for myalgias and neck pain.   Skin:  Negative for rash.   Neurological:  Positive for headaches. Negative for dizziness and weakness.   Endo/Heme/Allergies:  Negative for polydipsia.   Psychiatric/Behavioral:  Negative for depression. The patient is not nervous/anxious.       Outpatient Encounter Medications as of 7/11/2023   Medication Sig Dispense Refill    calcium carbonate (OS-FLO) 500 mg calcium (1,250 mg) tablet Take 2 tablets by mouth 2 (two) times daily.        cholecalciferol, vitamin D3, 125 mcg (5,000 unit) capsule Take 5,000 Units by mouth once daily.      ibuprofen (ADVIL,MOTRIN) 800 MG tablet       norethindrone ac-eth estradioL (FEMHRT LOW DOSE) 0.5-2.5 mg-mcg per tablet Take 1 tablet by mouth once daily. 30 tablet 0    [DISCONTINUED] propranoloL (INDERAL) 60 MG tablet Take 60 mg by mouth 2 (two) times daily.      fish oil-omega-3 fatty acids 300-1,000 mg capsule Take 2 g by mouth once daily.      levothyroxine (SYNTHROID) 150 MCG tablet Take 1 tablet (150 mcg total) by mouth before breakfast. 90 tablet 3    propranoloL (INDERAL) 60 MG tablet Take 1 tablet (60 mg total) by mouth 2 (two) times  daily. 180 tablet 3    [DISCONTINUED] docusate sodium (COLACE) 100 MG capsule Take 100 mg by mouth once daily.      [DISCONTINUED] hydroCHLOROthiazide (HYDRODIURIL) 25 MG tablet Take 1 tablet (25 mg total) by mouth once daily. 90 tablet 3    [DISCONTINUED] levothyroxine (SYNTHROID) 125 MCG tablet Take 1 tablet (125 mcg total) by mouth before breakfast. 30 tablet 6    [DISCONTINUED] losartan (COZAAR) 25 MG tablet Take 1 tablet (25 mg total) by mouth once daily. 90 tablet 3    [DISCONTINUED] pantoprazole (PROTONIX) 40 MG tablet Take 1 tablet (40 mg total) by mouth once daily. (Patient not taking: Reported on 7/11/2023) 30 tablet 12     No facility-administered encounter medications on file as of 7/11/2023.        Review of patient's allergies indicates:   Allergen Reactions    Paroxetine hcl Rash and Nausea And Vomiting    Prozac [fluoxetine]        Physical Exam      Vital Signs  Pulse: 89  Resp: 16  SpO2: 99 %  BP: 134/86  BP Location: Right arm  Patient Position: Sitting  Height and Weight  Weight: 69.4 kg (153 lb)    Physical Exam  Vitals and nursing note reviewed.   Constitutional:       General: She is not in acute distress.     Appearance: Normal appearance. She is well-developed. She is not ill-appearing.   HENT:      Head: Normocephalic and atraumatic.      Right Ear: External ear normal.      Left Ear: External ear normal.   Eyes:      Conjunctiva/sclera: Conjunctivae normal.      Pupils: Pupils are equal, round, and reactive to light.   Neck:      Thyroid: No thyromegaly.      Vascular: No JVD.      Trachea: No tracheal deviation.   Cardiovascular:      Rate and Rhythm: Normal rate and regular rhythm.      Heart sounds: Normal heart sounds. No murmur heard.  Pulmonary:      Effort: Pulmonary effort is normal.      Breath sounds: Normal breath sounds.   Chest:      Chest wall: No tenderness.   Abdominal:      General: Bowel sounds are normal.      Palpations: Abdomen is soft.      Tenderness: There is no  abdominal tenderness. There is no guarding.   Musculoskeletal:         General: Normal range of motion.      Cervical back: Normal range of motion and neck supple.   Lymphadenopathy:      Cervical: No cervical adenopathy.   Skin:     General: Skin is warm and dry.   Neurological:      General: No focal deficit present.      Mental Status: She is alert and oriented to person, place, and time.   Psychiatric:         Mood and Affect: Mood normal.         Behavior: Behavior normal.         Thought Content: Thought content normal.         Judgment: Judgment normal.        Laboratory:  CBC:  Lab Results   Component Value Date    WBC 6.50 07/11/2023    RBC 4.62 07/11/2023    HGB 13.6 07/11/2023    HCT 41.4 07/11/2023     07/11/2023    MCV 90 07/11/2023    MCH 29.4 07/11/2023    MCHC 32.9 07/11/2023    MCHC 33.4 08/14/2020    MCHC 34.1 01/02/2020     CMP:  Lab Results   Component Value Date     07/11/2023    CALCIUM 9.6 07/11/2023    ALBUMIN 4.1 07/11/2023    PROT 8.1 07/11/2023     07/11/2023    K 4.6 07/11/2023    CO2 27 07/11/2023     07/11/2023    BUN 7 07/11/2023    ALKPHOS 75 07/11/2023    ALT 27 07/11/2023    AST 31 07/11/2023    BILITOT 0.7 07/11/2023    BILITOT 0.4 08/14/2020    BILITOT 0.4 01/02/2020     URINALYSIS:  Lab Results   Component Value Date    COLORU Yellow 10/15/2019    SPECGRAV <=1.005 (A) 10/15/2019    PHUR 6.0 10/15/2019    PROTEINUA Negative 10/15/2019    BACTERIA Rare 07/05/2012    NITRITE Negative 10/15/2019    LEUKOCYTESUR Negative 10/15/2019    UROBILINOGEN Negative 10/15/2019      LIPIDS:  Lab Results   Component Value Date    TSH 0.185 (L) 07/11/2023    TSH 12.838 (H) 12/02/2020    TSH 3.486 08/14/2020    HDL 53 07/11/2023    HDL 45 08/14/2020    HDL 47 02/05/2015    CHOL 218 (H) 07/11/2023    CHOL 202 (H) 08/14/2020    CHOL 193 02/05/2015    TRIG 142 07/11/2023    TRIG 107 08/14/2020    TRIG 152 (H) 02/05/2015    LDLCALC 136.6 07/11/2023    LDLCALC 135.6 08/14/2020     LDLCALC 115.6 02/05/2015    CHOLHDL 24.3 07/11/2023    CHOLHDL 22.3 08/14/2020    CHOLHDL 24.4 02/05/2015    NONHDLCHOL 165 07/11/2023    NONHDLCHOL 157 08/14/2020    NONHDLCHOL 146 02/05/2015    TOTALCHOLEST 4.1 07/11/2023    TOTALCHOLEST 4.5 08/14/2020    TOTALCHOLEST 4.1 02/05/2015     TSH:  Lab Results   Component Value Date    TSH 0.185 (L) 07/11/2023    TSH 12.838 (H) 12/02/2020    TSH 3.486 08/14/2020     A1C:  Lab Results   Component Value Date    HGBA1C 5.4 07/11/2023    HGBA1C 5.6 08/14/2020         Assessment/Plan     Mary Alice Garcia is a 58 y.o.female with:    Annual physical exam  -     CBC Auto Differential; Future; Expected date: 07/11/2023  -     Comprehensive Metabolic Panel; Future; Expected date: 07/11/2023  -     Lipid Panel; Future; Expected date: 07/11/2023  -     TSH; Future; Expected date: 07/11/2023  -     T4, Free; Future; Expected date: 07/11/2023  -     Hemoglobin A1C; Future; Expected date: 07/11/2023  -     Hepatitis C Antibody; Future; Expected date: 07/11/2023  -     HIV 1/2 Ag/Ab (4th Gen); Future; Expected date: 07/11/2023    Primary hypertension  -     propranoloL (INDERAL) 60 MG tablet; Take 1 tablet (60 mg total) by mouth 2 (two) times daily.  Dispense: 180 tablet; Refill: 3  -     CBC Auto Differential; Future; Expected date: 07/11/2023  -     Comprehensive Metabolic Panel; Future; Expected date: 07/11/2023  -     Lipid Panel; Future; Expected date: 07/11/2023  -     TSH; Future; Expected date: 07/11/2023  -     T4, Free; Future; Expected date: 07/11/2023  -     Hemoglobin A1C; Future; Expected date: 07/11/2023  -     Hepatitis C Antibody; Future; Expected date: 07/11/2023  -     HIV 1/2 Ag/Ab (4th Gen); Future; Expected date: 07/11/2023    Hypothyroidism, unspecified type  -     levothyroxine (SYNTHROID) 150 MCG tablet; Take 1 tablet (150 mcg total) by mouth before breakfast.  Dispense: 90 tablet; Refill: 3    Gastroesophageal reflux disease without esophagitis    Screening  for HIV (human immunodeficiency virus)  -     CBC Auto Differential; Future; Expected date: 07/11/2023  -     Comprehensive Metabolic Panel; Future; Expected date: 07/11/2023  -     Lipid Panel; Future; Expected date: 07/11/2023  -     TSH; Future; Expected date: 07/11/2023  -     T4, Free; Future; Expected date: 07/11/2023  -     Hemoglobin A1C; Future; Expected date: 07/11/2023  -     Hepatitis C Antibody; Future; Expected date: 07/11/2023  -     HIV 1/2 Ag/Ab (4th Gen); Future; Expected date: 07/11/2023    Encounter for hepatitis C screening test for low risk patient  -     CBC Auto Differential; Future; Expected date: 07/11/2023  -     Comprehensive Metabolic Panel; Future; Expected date: 07/11/2023  -     Lipid Panel; Future; Expected date: 07/11/2023  -     TSH; Future; Expected date: 07/11/2023  -     T4, Free; Future; Expected date: 07/11/2023  -     Hemoglobin A1C; Future; Expected date: 07/11/2023  -     Hepatitis C Antibody; Future; Expected date: 07/11/2023  -     HIV 1/2 Ag/Ab (4th Gen); Future; Expected date: 07/11/2023    Screening for diabetes mellitus  -     CBC Auto Differential; Future; Expected date: 07/11/2023  -     Comprehensive Metabolic Panel; Future; Expected date: 07/11/2023  -     Lipid Panel; Future; Expected date: 07/11/2023  -     TSH; Future; Expected date: 07/11/2023  -     T4, Free; Future; Expected date: 07/11/2023  -     Hemoglobin A1C; Future; Expected date: 07/11/2023  -     Hepatitis C Antibody; Future; Expected date: 07/11/2023  -     HIV 1/2 Ag/Ab (4th Gen); Future; Expected date: 07/11/2023    Encounter for lipid screening for cardiovascular disease  -     CBC Auto Differential; Future; Expected date: 07/11/2023  -     Comprehensive Metabolic Panel; Future; Expected date: 07/11/2023  -     Lipid Panel; Future; Expected date: 07/11/2023  -     TSH; Future; Expected date: 07/11/2023  -     T4, Free; Future; Expected date: 07/11/2023  -     Hemoglobin A1C; Future; Expected  date: 07/11/2023  -     Hepatitis C Antibody; Future; Expected date: 07/11/2023  -     HIV 1/2 Ag/Ab (4th Gen); Future; Expected date: 07/11/2023    Screening for eye condition  -     Ambulatory referral/consult to Optometry; Future; Expected date: 07/11/2023        Continue current meds and supplements    Health Maintenance Due   Topic Date Due    Mammogram  12/19/2020        I spent 38 minutes on the day of this encounter for preparing for, evaluating, treating, and managing this patient.      -Continue current medications and maintain follow up with specialists.  Return to clinic in Follow up in about 1 year (around 7/11/2024), or if symptoms worsen or fail to improve.       JULISA Dover  [facility]

## 2023-07-11 NOTE — LETTER
July 11, 2023      [facility]  [address] [facility] [address]       Patient: Mary Alice Garcia   YOB: 1965  Date of Visit: 07/11/2023    To Whom It May Concern:    Anuel Garcia  was at [facility] on 07/11/2023. The patient may return to work/school on 7/11/2023 with no restrictions. If you have any questions or concerns, or if I can be of further assistance, please do not hesitate to contact me.    Sincerely,    Santiago Hargrove MA

## 2023-07-12 ENCOUNTER — TELEPHONE (OUTPATIENT)
Dept: PRIMARY CARE CLINIC | Facility: CLINIC | Age: 58
End: 2023-07-12
Payer: COMMERCIAL

## 2023-07-12 NOTE — TELEPHONE ENCOUNTER
----- Message from Josse Grewal sent at 7/12/2023  1:45 PM CDT -----  Contact: [phone]  Pt said she has some questions about her results and would like a call back.

## 2023-07-14 ENCOUNTER — PATIENT MESSAGE (OUTPATIENT)
Dept: PRIMARY CARE CLINIC | Facility: CLINIC | Age: 58
End: 2023-07-14
Payer: COMMERCIAL

## 2023-07-17 ENCOUNTER — OFFICE VISIT (OUTPATIENT)
Dept: OBSTETRICS AND GYNECOLOGY | Facility: CLINIC | Age: 58
End: 2023-07-17
Payer: COMMERCIAL

## 2023-07-17 ENCOUNTER — PROCEDURE VISIT (OUTPATIENT)
Dept: OBSTETRICS AND GYNECOLOGY | Facility: CLINIC | Age: 58
End: 2023-07-17
Payer: COMMERCIAL

## 2023-07-17 VITALS
DIASTOLIC BLOOD PRESSURE: 84 MMHG | WEIGHT: 154.31 LBS | BODY MASS INDEX: 27.34 KG/M2 | SYSTOLIC BLOOD PRESSURE: 130 MMHG

## 2023-07-17 DIAGNOSIS — R87.810 PAP SMEAR OF CERVIX SHOWS HIGH RISK HPV PRESENT: Primary | ICD-10-CM

## 2023-07-17 DIAGNOSIS — E03.9 ACQUIRED HYPOTHYROIDISM: Primary | ICD-10-CM

## 2023-07-17 PROCEDURE — 88305 TISSUE EXAM BY PATHOLOGIST: ICD-10-PCS | Mod: 26,,, | Performed by: PATHOLOGY

## 2023-07-17 PROCEDURE — 99999 PR PBB SHADOW E&M-EST. PATIENT-LVL I: CPT | Mod: PBBFAC,,, | Performed by: OBSTETRICS & GYNECOLOGY

## 2023-07-17 PROCEDURE — 57456 PR COLPOSCOPY,CERVIX W/ADJ VAGINA, CURETTAG: ICD-10-PCS | Mod: S$GLB,,, | Performed by: OBSTETRICS & GYNECOLOGY

## 2023-07-17 PROCEDURE — 88305 TISSUE EXAM BY PATHOLOGIST: CPT | Performed by: PATHOLOGY

## 2023-07-17 PROCEDURE — 57456 ENDOCERV CURETTAGE W/SCOPE: CPT | Mod: S$GLB,,, | Performed by: OBSTETRICS & GYNECOLOGY

## 2023-07-17 PROCEDURE — 99499 UNLISTED E&M SERVICE: CPT | Mod: S$GLB,,, | Performed by: OBSTETRICS & GYNECOLOGY

## 2023-07-17 PROCEDURE — 88305 TISSUE EXAM BY PATHOLOGIST: CPT | Mod: 26,,, | Performed by: PATHOLOGY

## 2023-07-17 PROCEDURE — 99999 PR PBB SHADOW E&M-EST. PATIENT-LVL I: ICD-10-PCS | Mod: PBBFAC,,, | Performed by: OBSTETRICS & GYNECOLOGY

## 2023-07-17 PROCEDURE — 99499 NO LOS: ICD-10-PCS | Mod: S$GLB,,, | Performed by: OBSTETRICS & GYNECOLOGY

## 2023-07-17 RX ORDER — LEVOTHYROXINE SODIUM 137 UG/1
137 TABLET ORAL
Qty: 90 TABLET | Refills: 3 | Status: SHIPPED | OUTPATIENT
Start: 2023-07-17 | End: 2023-10-17 | Stop reason: SDUPTHER

## 2023-07-17 NOTE — PROGRESS NOTES
Colposcopy Procedure Note     Patient present for colposcopy secondary last pap smear abnormal which was reviewed.  Indication for colposcopy was normal Pap 6/2023 with HR HPV, type 18.    The patient was shown the colposcopy consent video, unless she had watched the video at a previous visit. Written informed consent obtained. All questions were answered prior to the initiation of the procedure.    Speculum placed in vagina and excellent visualization of cervix achieved, cervix swabbed x 3 with acetic acid solution.    Findings:  Cervix: no visible lesions    Ectocervical biopsy was not performed. ECC performed. Hemostasis was achieved with Monsel's solution. The speculum was removed. The patient tolerated the procedure well.     Colposcopy was considered satisfactory.    Specimens labelled and sent to Pathology.     Specimens: to Path    Complications:  None    Assessment:  Encounter Diagnosis   Name Primary?    Pap smear of cervix shows high risk HPV present Yes         Counseling:  Discussed if normal results pt will simply need repeat Pap in 1 yr.  Pt will f/u for result discussion if abnormal.       Orders Placed This Encounter   Procedures    Colposcopy W/BIOPSY AND ECC- Today

## 2023-07-17 NOTE — LETTER
July 17, 2023    Mary Alice Garcia  [address] [facility] [place] [address]         [facility]  [address]  [place] [address]  Phone: [phone] July 17, 2023     Patient: Mary Alice Garcia   YOB: 1965   Date of Visit: 7/17/2023       To Whom It May Concern:    It is my medical opinion that Mary Alice Garcia may return to work on Tuesday July 18,2023 with out any restrictions .    If you have any questions or concerns, please don't hesitate to call.    Sincerely,        Piero Velásquez MD

## 2023-07-18 ENCOUNTER — OFFICE VISIT (OUTPATIENT)
Dept: PRIMARY CARE CLINIC | Facility: CLINIC | Age: 58
End: 2023-07-18
Payer: COMMERCIAL

## 2023-07-18 ENCOUNTER — LAB VISIT (OUTPATIENT)
Dept: LAB | Facility: HOSPITAL | Age: 58
End: 2023-07-18
Attending: NURSE PRACTITIONER
Payer: COMMERCIAL

## 2023-07-18 VITALS
SYSTOLIC BLOOD PRESSURE: 126 MMHG | DIASTOLIC BLOOD PRESSURE: 92 MMHG | OXYGEN SATURATION: 99 % | HEART RATE: 71 BPM | BODY MASS INDEX: 27.23 KG/M2 | HEIGHT: 63 IN | WEIGHT: 153.69 LBS

## 2023-07-18 DIAGNOSIS — I10 ESSENTIAL HYPERTENSION: ICD-10-CM

## 2023-07-18 DIAGNOSIS — R19.7 DIARRHEA, UNSPECIFIED TYPE: ICD-10-CM

## 2023-07-18 DIAGNOSIS — R19.7 DIARRHEA, UNSPECIFIED TYPE: Primary | ICD-10-CM

## 2023-07-18 DIAGNOSIS — K92.1 HEMATOCHEZIA: ICD-10-CM

## 2023-07-18 LAB
ALBUMIN SERPL BCP-MCNC: 3.9 G/DL (ref 3.5–5.2)
ALP SERPL-CCNC: 76 U/L (ref 55–135)
ALT SERPL W/O P-5'-P-CCNC: 21 U/L (ref 10–44)
ANION GAP SERPL CALC-SCNC: 9 MMOL/L (ref 8–16)
AST SERPL-CCNC: 24 U/L (ref 10–40)
BASOPHILS # BLD AUTO: 0.08 K/UL (ref 0–0.2)
BASOPHILS NFR BLD: 0.8 % (ref 0–1.9)
BILIRUB SERPL-MCNC: 0.4 MG/DL (ref 0.1–1)
BUN SERPL-MCNC: 9 MG/DL (ref 6–20)
CALCIUM SERPL-MCNC: 9.6 MG/DL (ref 8.7–10.5)
CHLORIDE SERPL-SCNC: 103 MMOL/L (ref 95–110)
CO2 SERPL-SCNC: 25 MMOL/L (ref 23–29)
CREAT SERPL-MCNC: 0.7 MG/DL (ref 0.5–1.4)
DIFFERENTIAL METHOD: NORMAL
EOSINOPHIL # BLD AUTO: 0.2 K/UL (ref 0–0.5)
EOSINOPHIL NFR BLD: 1.9 % (ref 0–8)
ERYTHROCYTE [DISTWIDTH] IN BLOOD BY AUTOMATED COUNT: 12.3 % (ref 11.5–14.5)
EST. GFR  (NO RACE VARIABLE): >60 ML/MIN/1.73 M^2
GLUCOSE SERPL-MCNC: 109 MG/DL (ref 70–110)
HCT VFR BLD AUTO: 41 % (ref 37–48.5)
HGB BLD-MCNC: 13.5 G/DL (ref 12–16)
IMM GRANULOCYTES # BLD AUTO: 0.01 K/UL (ref 0–0.04)
IMM GRANULOCYTES NFR BLD AUTO: 0.1 % (ref 0–0.5)
LIPASE SERPL-CCNC: 22 U/L (ref 4–60)
LYMPHOCYTES # BLD AUTO: 3.5 K/UL (ref 1–4.8)
LYMPHOCYTES NFR BLD: 36.9 % (ref 18–48)
MCH RBC QN AUTO: 29.6 PG (ref 27–31)
MCHC RBC AUTO-ENTMCNC: 32.9 G/DL (ref 32–36)
MCV RBC AUTO: 90 FL (ref 82–98)
MONOCYTES # BLD AUTO: 0.6 K/UL (ref 0.3–1)
MONOCYTES NFR BLD: 6 % (ref 4–15)
NEUTROPHILS # BLD AUTO: 5.2 K/UL (ref 1.8–7.7)
NEUTROPHILS NFR BLD: 54.3 % (ref 38–73)
NRBC BLD-RTO: 0 /100 WBC
PLATELET # BLD AUTO: 306 K/UL (ref 150–450)
PMV BLD AUTO: 10.5 FL (ref 9.2–12.9)
POTASSIUM SERPL-SCNC: 4.7 MMOL/L (ref 3.5–5.1)
PROT SERPL-MCNC: 7.9 G/DL (ref 6–8.4)
RBC # BLD AUTO: 4.56 M/UL (ref 4–5.4)
SODIUM SERPL-SCNC: 137 MMOL/L (ref 136–145)
WBC # BLD AUTO: 9.54 K/UL (ref 3.9–12.7)

## 2023-07-18 PROCEDURE — 36415 COLL VENOUS BLD VENIPUNCTURE: CPT | Performed by: STUDENT IN AN ORGANIZED HEALTH CARE EDUCATION/TRAINING PROGRAM

## 2023-07-18 PROCEDURE — 3074F PR MOST RECENT SYSTOLIC BLOOD PRESSURE < 130 MM HG: ICD-10-PCS | Mod: CPTII,S$GLB,, | Performed by: STUDENT IN AN ORGANIZED HEALTH CARE EDUCATION/TRAINING PROGRAM

## 2023-07-18 PROCEDURE — 99999 PR PBB SHADOW E&M-EST. PATIENT-LVL III: CPT | Mod: PBBFAC,,, | Performed by: STUDENT IN AN ORGANIZED HEALTH CARE EDUCATION/TRAINING PROGRAM

## 2023-07-18 PROCEDURE — 3044F HG A1C LEVEL LT 7.0%: CPT | Mod: CPTII,S$GLB,, | Performed by: STUDENT IN AN ORGANIZED HEALTH CARE EDUCATION/TRAINING PROGRAM

## 2023-07-18 PROCEDURE — 3008F BODY MASS INDEX DOCD: CPT | Mod: CPTII,S$GLB,, | Performed by: STUDENT IN AN ORGANIZED HEALTH CARE EDUCATION/TRAINING PROGRAM

## 2023-07-18 PROCEDURE — 3080F DIAST BP >= 90 MM HG: CPT | Mod: CPTII,S$GLB,, | Performed by: STUDENT IN AN ORGANIZED HEALTH CARE EDUCATION/TRAINING PROGRAM

## 2023-07-18 PROCEDURE — 1159F PR MEDICATION LIST DOCUMENTED IN MEDICAL RECORD: ICD-10-PCS | Mod: CPTII,S$GLB,, | Performed by: STUDENT IN AN ORGANIZED HEALTH CARE EDUCATION/TRAINING PROGRAM

## 2023-07-18 PROCEDURE — 3074F SYST BP LT 130 MM HG: CPT | Mod: CPTII,S$GLB,, | Performed by: STUDENT IN AN ORGANIZED HEALTH CARE EDUCATION/TRAINING PROGRAM

## 2023-07-18 PROCEDURE — 83690 ASSAY OF LIPASE: CPT | Performed by: STUDENT IN AN ORGANIZED HEALTH CARE EDUCATION/TRAINING PROGRAM

## 2023-07-18 PROCEDURE — 1160F RVW MEDS BY RX/DR IN RCRD: CPT | Mod: CPTII,S$GLB,, | Performed by: STUDENT IN AN ORGANIZED HEALTH CARE EDUCATION/TRAINING PROGRAM

## 2023-07-18 PROCEDURE — 1159F MED LIST DOCD IN RCRD: CPT | Mod: CPTII,S$GLB,, | Performed by: STUDENT IN AN ORGANIZED HEALTH CARE EDUCATION/TRAINING PROGRAM

## 2023-07-18 PROCEDURE — 99214 PR OFFICE/OUTPT VISIT, EST, LEVL IV, 30-39 MIN: ICD-10-PCS | Mod: S$GLB,,, | Performed by: STUDENT IN AN ORGANIZED HEALTH CARE EDUCATION/TRAINING PROGRAM

## 2023-07-18 PROCEDURE — 99999 PR PBB SHADOW E&M-EST. PATIENT-LVL III: ICD-10-PCS | Mod: PBBFAC,,, | Performed by: STUDENT IN AN ORGANIZED HEALTH CARE EDUCATION/TRAINING PROGRAM

## 2023-07-18 PROCEDURE — 85025 COMPLETE CBC W/AUTO DIFF WBC: CPT | Performed by: STUDENT IN AN ORGANIZED HEALTH CARE EDUCATION/TRAINING PROGRAM

## 2023-07-18 PROCEDURE — 80053 COMPREHEN METABOLIC PANEL: CPT | Performed by: STUDENT IN AN ORGANIZED HEALTH CARE EDUCATION/TRAINING PROGRAM

## 2023-07-18 PROCEDURE — 99214 OFFICE O/P EST MOD 30 MIN: CPT | Mod: S$GLB,,, | Performed by: STUDENT IN AN ORGANIZED HEALTH CARE EDUCATION/TRAINING PROGRAM

## 2023-07-18 PROCEDURE — 3044F PR MOST RECENT HEMOGLOBIN A1C LEVEL <7.0%: ICD-10-PCS | Mod: CPTII,S$GLB,, | Performed by: STUDENT IN AN ORGANIZED HEALTH CARE EDUCATION/TRAINING PROGRAM

## 2023-07-18 PROCEDURE — 3080F PR MOST RECENT DIASTOLIC BLOOD PRESSURE >= 90 MM HG: ICD-10-PCS | Mod: CPTII,S$GLB,, | Performed by: STUDENT IN AN ORGANIZED HEALTH CARE EDUCATION/TRAINING PROGRAM

## 2023-07-18 PROCEDURE — 3008F PR BODY MASS INDEX (BMI) DOCUMENTED: ICD-10-PCS | Mod: CPTII,S$GLB,, | Performed by: STUDENT IN AN ORGANIZED HEALTH CARE EDUCATION/TRAINING PROGRAM

## 2023-07-18 PROCEDURE — 1160F PR REVIEW ALL MEDS BY PRESCRIBER/CLIN PHARMACIST DOCUMENTED: ICD-10-PCS | Mod: CPTII,S$GLB,, | Performed by: STUDENT IN AN ORGANIZED HEALTH CARE EDUCATION/TRAINING PROGRAM

## 2023-07-18 RX ORDER — DICYCLOMINE HYDROCHLORIDE 10 MG/1
10 CAPSULE ORAL
Qty: 30 CAPSULE | Refills: 0 | Status: SHIPPED | OUTPATIENT
Start: 2023-07-18 | End: 2023-07-18 | Stop reason: SDUPTHER

## 2023-07-18 RX ORDER — ONDANSETRON 4 MG/1
4 TABLET, ORALLY DISINTEGRATING ORAL EVERY 8 HOURS PRN
Qty: 30 TABLET | Refills: 0 | Status: SHIPPED | OUTPATIENT
Start: 2023-07-18 | End: 2023-07-18 | Stop reason: SDUPTHER

## 2023-07-18 RX ORDER — ONDANSETRON 4 MG/1
4 TABLET, ORALLY DISINTEGRATING ORAL EVERY 8 HOURS PRN
Qty: 30 TABLET | Refills: 0 | Status: SHIPPED | OUTPATIENT
Start: 2023-07-18 | End: 2023-09-14

## 2023-07-18 RX ORDER — DICYCLOMINE HYDROCHLORIDE 10 MG/1
10 CAPSULE ORAL
Qty: 30 CAPSULE | Refills: 0 | Status: SHIPPED | OUTPATIENT
Start: 2023-07-18 | End: 2023-08-17

## 2023-07-18 NOTE — LETTER
July 18, 2023    Mary Alice Garcia  [address] [facility] [place] [address]             [facility]  [address] [facility] [place] [address] To Whom This May Concern,    Ms. Mary Alice Garcia was seen and assessed by me on 7/18/2023    Please excuse her from work on 7/18/2023.    She may return to work on 7/20/2023 without restrictions.    Sincerely,          Aisha Quinn MD  [facility]  Section of Internal Medicine

## 2023-07-19 ENCOUNTER — PATIENT MESSAGE (OUTPATIENT)
Dept: OBSTETRICS AND GYNECOLOGY | Facility: CLINIC | Age: 58
End: 2023-07-19
Payer: COMMERCIAL

## 2023-07-19 ENCOUNTER — TELEPHONE (OUTPATIENT)
Dept: RADIOLOGY | Facility: HOSPITAL | Age: 58
End: 2023-07-19

## 2023-07-19 ENCOUNTER — TELEPHONE (OUTPATIENT)
Dept: OBSTETRICS AND GYNECOLOGY | Facility: CLINIC | Age: 58
End: 2023-07-19
Payer: COMMERCIAL

## 2023-07-19 LAB
FINAL PATHOLOGIC DIAGNOSIS: NORMAL
GROSS: NORMAL
Lab: NORMAL

## 2023-07-19 NOTE — TELEPHONE ENCOUNTER
Pt inquiring about mammogram results. Diagnostic mammogram order placed by Dr. Velásquez. Mammogram scheduled.     ----- Message from Rachana Rivas sent at 7/19/2023 12:09 PM CDT -----  Regarding: Request for Test Results  Type:  Test Results    Who Called: Self     Name of Test (Lab/Mammo/Etc): colp procedure     Date of Test: 7/17/23    Ordering Provider: Dr. Velásquez     Where the test was performed: [facility]    Would the patient rather a call back or a response via [facility]? Call     Best Call Back Number: .[phone]      Additional Information:      For Clinical Team:Has the provider reviewed the results?

## 2023-07-20 ENCOUNTER — TELEPHONE (OUTPATIENT)
Dept: OBSTETRICS AND GYNECOLOGY | Facility: HOSPITAL | Age: 58
End: 2023-07-20
Payer: COMMERCIAL

## 2023-07-20 DIAGNOSIS — R92.8 ABNORMALITY OF RIGHT BREAST ON SCREENING MAMMOGRAM: Primary | ICD-10-CM

## 2023-07-24 ENCOUNTER — HOSPITAL ENCOUNTER (OUTPATIENT)
Dept: RADIOLOGY | Facility: HOSPITAL | Age: 58
Discharge: HOME | End: 2023-07-24
Attending: OBSTETRICS & GYNECOLOGY
Payer: COMMERCIAL

## 2023-07-24 ENCOUNTER — LAB VISIT (OUTPATIENT)
Dept: LAB | Facility: HOSPITAL | Age: 58
End: 2023-07-24
Attending: STUDENT IN AN ORGANIZED HEALTH CARE EDUCATION/TRAINING PROGRAM
Payer: COMMERCIAL

## 2023-07-24 DIAGNOSIS — R92.8 ABNORMAL MAMMOGRAM OF RIGHT BREAST: ICD-10-CM

## 2023-07-24 DIAGNOSIS — K92.1 HEMATOCHEZIA: ICD-10-CM

## 2023-07-24 DIAGNOSIS — R19.7 DIARRHEA, UNSPECIFIED TYPE: ICD-10-CM

## 2023-07-24 PROCEDURE — 87045 FECES CULTURE AEROBIC BACT: CPT | Performed by: STUDENT IN AN ORGANIZED HEALTH CARE EDUCATION/TRAINING PROGRAM

## 2023-07-24 PROCEDURE — 87449 NOS EACH ORGANISM AG IA: CPT | Performed by: STUDENT IN AN ORGANIZED HEALTH CARE EDUCATION/TRAINING PROGRAM

## 2023-07-24 PROCEDURE — 77065 MAMMO DIGITAL DIAGNOSTIC RIGHT WITH TOMO: ICD-10-PCS | Mod: 26,RT,, | Performed by: RADIOLOGY

## 2023-07-24 PROCEDURE — 77061 MAMMO DIGITAL DIAGNOSTIC RIGHT WITH TOMO: ICD-10-PCS | Mod: 26,RT,, | Performed by: RADIOLOGY

## 2023-07-24 PROCEDURE — 77061 BREAST TOMOSYNTHESIS UNI: CPT | Mod: TC,RT

## 2023-07-24 PROCEDURE — 87046 STOOL CULTR AEROBIC BACT EA: CPT | Performed by: STUDENT IN AN ORGANIZED HEALTH CARE EDUCATION/TRAINING PROGRAM

## 2023-07-24 PROCEDURE — 77061 BREAST TOMOSYNTHESIS UNI: CPT | Mod: 26,RT,, | Performed by: RADIOLOGY

## 2023-07-24 PROCEDURE — 77065 DX MAMMO INCL CAD UNI: CPT | Mod: 26,RT,, | Performed by: RADIOLOGY

## 2023-07-24 PROCEDURE — 87427 SHIGA-LIKE TOXIN AG IA: CPT | Mod: 59 | Performed by: STUDENT IN AN ORGANIZED HEALTH CARE EDUCATION/TRAINING PROGRAM

## 2023-07-24 NOTE — PROGRESS NOTES
INTERNAL MEDICINE SAME DAY PRIMARY CARE VISIT NOTE    Subjective:     Chief Complaint: Abdominal Pain, Diarrhea, and Rectal Bleeding       Patient ID: Mary Alice Garcia is a 58 y.o. female with HTN, here today for focused same-day primary care visit.    Today, patient with complaint of diarrhea    Diarrhea x 2 weeks. Liquid stool. Stool initially liquid, started to solidify, but is now liquid again. + Hematochezia x 2 days. >5 BM/day. Tolerating fluids. [place] diet. No abdominal pain. No fevers. No other recent illnesses. Colonoscopy UTD.    HTN: Propranolol 20 bid. Tolerating.    Past Medical History:  Past Medical History:   Diagnosis Date    Arthritis     Depression     Hyperlipidemia     Hypertension     2013    Syncope and collapse     Thyroid disease     Tuberculosis        Home Medications:  Prior to Admission medications    Medication Sig Start Date End Date Taking? Authorizing Provider   calcium carbonate (OS-FLO) 500 mg calcium (1,250 mg) tablet Take 2 tablets by mouth 2 (two) times daily.     Yes Historical Provider   cholecalciferol, vitamin D3, 125 mcg (5,000 unit) capsule Take 5,000 Units by mouth once daily.   Yes Historical Provider   fish oil-omega-3 fatty acids 300-1,000 mg capsule Take 2 g by mouth once daily.   Yes Historical Provider   ibuprofen (ADVIL,MOTRIN) 800 MG tablet  6/24/23  Yes Historical Provider   levothyroxine (SYNTHROID) 137 MCG Tab tablet Take 1 tablet (137 mcg total) by mouth before breakfast. 7/17/23 7/16/24 Yes Kelley Badillo NP   norethindrone ac-eth estradioL (FEMHRT LOW DOSE) 0.5-2.5 mg-mcg per tablet Take 1 tablet by mouth once daily. 6/12/23 6/11/24 Yes Piero Velásquez MD   propranoloL (INDERAL) 60 MG tablet Take 1 tablet (60 mg total) by mouth 2 (two) times daily. 7/11/23  Yes Kelley Badillo NP   dicyclomine (BENTYL) 10 MG capsule Take 1 capsule (10 mg total) by mouth 4 (four) times daily before meals and nightly. 7/18/23 8/17/23  Aisha Quinn MD   ondansetron  "(ZOFRAN-ODT) 4 MG TbDL Dissolve 1 tablet (4 mg total) by mouth every 8 (eight) hours as needed (nausea). 7/18/23   Aisha Quinn MD       Allergies:  Review of patient's allergies indicates:   Allergen Reactions    Paroxetine hcl Rash and Nausea And Vomiting    Prozac [fluoxetine]        Social History:  Social History     Tobacco Use    Smoking status: Never    Smokeless tobacco: Never   Substance Use Topics    Alcohol use: Yes     Alcohol/week: 8.0 standard drinks     Types: 3 Glasses of wine, 5 Cans of beer per week    Drug use: No         Review of Systems   Constitutional:  Negative for diaphoresis, fatigue and fever.   HENT:  Negative for congestion, ear pain and voice change.    Eyes:  Negative for discharge, redness and itching.   Respiratory:  Negative for shortness of breath and wheezing.    Cardiovascular:  Negative for chest pain.   Gastrointestinal:  Positive for diarrhea and nausea. Negative for abdominal pain and vomiting.   Musculoskeletal:  Negative for gait problem and joint swelling.   Skin:  Negative for color change, pallor, rash and wound.   Neurological:  Negative for weakness.         Objective:   BP (!) 126/92 (BP Location: Right arm, Patient Position: Sitting, BP Method: Medium (Manual))   Pulse 71   Ht 5' 3" (1.6 m)   Wt 69.7 kg (153 lb 10.6 oz)   SpO2 99%   BMI 27.22 kg/m²        General: AAO x3, no apparent distress  HEENT: PERRL, OP clear  CV: RRR, no m/r/g  Pulm: Lungs CTAB, no crackles, no wheezes  Abd: s/NT/ND +BS  Extremities: no c/c/e    Labs:         Assessment/Plan     Mary Alice was seen today for abdominal pain, diarrhea and rectal bleeding.    Diagnoses and all orders for this visit:    Diarrhea, unspecified type  -     H. pylori antigen, stool; Future  -     Pancreatic elastase, fecal; Future  -     Fecal fat, qualitative; Future  -     Occult blood x 1, stool; Future  -     WBC, Stool; Future  -     Rotavirus antigen, stool; Future  -     Adenovirus Molecular " Detection, PCR, Non-Blood Stool; Future  -     Calprotectin, Stool; Future  -     Giardia / Cryptosporidum, EIA; Future  -     Stool Exam-Ova,Cysts,Parasites; Future  -     Clostridium difficile EIA; Future  -     Stool culture; Future  -     CBC W/ AUTO DIFFERENTIAL; Future  -     LIPASE; Future  -     COMPREHENSIVE METABOLIC PANEL; Future  -     Discontinue: ondansetron (ZOFRAN-ODT) 4 MG TbDL; Take 1 tablet (4 mg total) by mouth every 8 (eight) hours as needed (nausea).  -     Discontinue: dicyclomine (BENTYL) 10 MG capsule; Take 1 capsule (10 mg total) by mouth 4 (four) times daily before meals and nightly.  -     dicyclomine (BENTYL) 10 MG capsule; Take 1 capsule (10 mg total) by mouth 4 (four) times daily before meals and nightly.  -     ondansetron (ZOFRAN-ODT) 4 MG TbDL; Dissolve 1 tablet (4 mg total) by mouth every 8 (eight) hours as needed (nausea).  Explained etiology of symptoms. Advised prn zofran for nausea. Hydrate with water or electrolyte replacement drink. Tylenol as needed for fevers. ED precautions given, patient verbalizes understanding. Advised following a BRAT diet as appetite improves.    Hematochezia  -     H. pylori antigen, stool; Future  -     Pancreatic elastase, fecal; Future  -     Fecal fat, qualitative; Future  -     Occult blood x 1, stool; Future  -     WBC, Stool; Future  -     Rotavirus antigen, stool; Future  -     Adenovirus Molecular Detection, PCR, Non-Blood Stool; Future  -     Calprotectin, Stool; Future  -     Giardia / Cryptosporidum, EIA; Future  -     Stool Exam-Ova,Cysts,Parasites; Future  -     Clostridium difficile EIA; Future  -     Stool culture; Future    Essential hypertension  Stable on medications, continue regimen        RTC prn and with PCP as per routine.    Aisha Quinn MD  Department of Internal Medicine - [facility]

## 2023-07-25 LAB
E COLI SXT1 STL QL IA: NEGATIVE
E COLI SXT2 STL QL IA: NEGATIVE

## 2023-07-26 LAB — BACTERIA STL CULT: NORMAL

## 2023-07-26 NOTE — TELEPHONE ENCOUNTER
Pt is requesting a hrt refill to go to [facility] on [address] in [facility]. Pt also advised of ultrasound order placed. Pt will schedule.     ----- Message from Rachana Rivas sent at 7/26/2023  8:45 AM CDT -----  Regarding: patient call back  Type: Patient Call Back    Who called: Self     What is the request in detail: Wants to know if she still needs to take the hormone Rx.     Can the clinic reply by [facility]? No     Would the patient rather a call back or a response via [facility]? Call     Best call back number: .[phone]    [facility] [address] [place] [address] [address] [facility]  [address] [address] [facility] [address]  Phone: [phone] Fax: [phone]      Additional Information: She said that under no circumstance does she want to use [facility] ever again

## 2023-07-27 RX ORDER — NORETHINDRONE ACETATE AND ETHINYL ESTRADIOL .5; 2.5 MG/1; UG/1
1 TABLET ORAL DAILY
Qty: 90 TABLET | Refills: 3 | Status: SHIPPED | OUTPATIENT
Start: 2023-07-27 | End: 2024-02-26

## 2023-07-28 ENCOUNTER — PATIENT MESSAGE (OUTPATIENT)
Dept: PRIMARY CARE CLINIC | Facility: CLINIC | Age: 58
End: 2023-07-28
Payer: COMMERCIAL

## 2023-07-28 DIAGNOSIS — K92.1 HEMATOCHEZIA: Primary | ICD-10-CM

## 2023-07-28 NOTE — TELEPHONE ENCOUNTER
Please let her know, at this time, I think it would be a good idea to get evaluated by a GI doctor.  Just placed a referral for her to get scheduled.

## 2023-08-04 ENCOUNTER — TELEPHONE (OUTPATIENT)
Dept: PRIMARY CARE CLINIC | Facility: CLINIC | Age: 58
End: 2023-08-04
Payer: COMMERCIAL

## 2023-08-04 ENCOUNTER — OFFICE VISIT (OUTPATIENT)
Dept: INTERNAL MEDICINE | Facility: CLINIC | Age: 58
End: 2023-08-04
Payer: COMMERCIAL

## 2023-08-04 VITALS
WEIGHT: 155 LBS | HEART RATE: 74 BPM | TEMPERATURE: 98 F | SYSTOLIC BLOOD PRESSURE: 112 MMHG | HEIGHT: 63 IN | OXYGEN SATURATION: 99 % | DIASTOLIC BLOOD PRESSURE: 90 MMHG | BODY MASS INDEX: 27.46 KG/M2

## 2023-08-04 DIAGNOSIS — J06.9 UPPER RESPIRATORY TRACT INFECTION, UNSPECIFIED TYPE: Primary | ICD-10-CM

## 2023-08-04 PROCEDURE — 3044F HG A1C LEVEL LT 7.0%: CPT | Mod: CPTII,S$GLB,, | Performed by: INTERNAL MEDICINE

## 2023-08-04 PROCEDURE — 3008F PR BODY MASS INDEX (BMI) DOCUMENTED: ICD-10-PCS | Mod: CPTII,S$GLB,, | Performed by: INTERNAL MEDICINE

## 2023-08-04 PROCEDURE — 1160F PR REVIEW ALL MEDS BY PRESCRIBER/CLIN PHARMACIST DOCUMENTED: ICD-10-PCS | Mod: CPTII,S$GLB,, | Performed by: INTERNAL MEDICINE

## 2023-08-04 PROCEDURE — 99214 OFFICE O/P EST MOD 30 MIN: CPT | Mod: S$GLB,,, | Performed by: INTERNAL MEDICINE

## 2023-08-04 PROCEDURE — 1159F MED LIST DOCD IN RCRD: CPT | Mod: CPTII,S$GLB,, | Performed by: INTERNAL MEDICINE

## 2023-08-04 PROCEDURE — 3008F BODY MASS INDEX DOCD: CPT | Mod: CPTII,S$GLB,, | Performed by: INTERNAL MEDICINE

## 2023-08-04 PROCEDURE — 3074F SYST BP LT 130 MM HG: CPT | Mod: CPTII,S$GLB,, | Performed by: INTERNAL MEDICINE

## 2023-08-04 PROCEDURE — 3044F PR MOST RECENT HEMOGLOBIN A1C LEVEL <7.0%: ICD-10-PCS | Mod: CPTII,S$GLB,, | Performed by: INTERNAL MEDICINE

## 2023-08-04 PROCEDURE — 99214 PR OFFICE/OUTPT VISIT, EST, LEVL IV, 30-39 MIN: ICD-10-PCS | Mod: S$GLB,,, | Performed by: INTERNAL MEDICINE

## 2023-08-04 PROCEDURE — 3074F PR MOST RECENT SYSTOLIC BLOOD PRESSURE < 130 MM HG: ICD-10-PCS | Mod: CPTII,S$GLB,, | Performed by: INTERNAL MEDICINE

## 2023-08-04 PROCEDURE — 99999 PR PBB SHADOW E&M-EST. PATIENT-LVL IV: CPT | Mod: PBBFAC,,, | Performed by: INTERNAL MEDICINE

## 2023-08-04 PROCEDURE — 1159F PR MEDICATION LIST DOCUMENTED IN MEDICAL RECORD: ICD-10-PCS | Mod: CPTII,S$GLB,, | Performed by: INTERNAL MEDICINE

## 2023-08-04 PROCEDURE — 99999 PR PBB SHADOW E&M-EST. PATIENT-LVL IV: ICD-10-PCS | Mod: PBBFAC,,, | Performed by: INTERNAL MEDICINE

## 2023-08-04 PROCEDURE — 3080F DIAST BP >= 90 MM HG: CPT | Mod: CPTII,S$GLB,, | Performed by: INTERNAL MEDICINE

## 2023-08-04 PROCEDURE — 1160F RVW MEDS BY RX/DR IN RCRD: CPT | Mod: CPTII,S$GLB,, | Performed by: INTERNAL MEDICINE

## 2023-08-04 PROCEDURE — 3080F PR MOST RECENT DIASTOLIC BLOOD PRESSURE >= 90 MM HG: ICD-10-PCS | Mod: CPTII,S$GLB,, | Performed by: INTERNAL MEDICINE

## 2023-08-04 RX ORDER — BENZONATATE 200 MG/1
200 CAPSULE ORAL 3 TIMES DAILY PRN
Qty: 30 CAPSULE | Refills: 0 | Status: SHIPPED | OUTPATIENT
Start: 2023-08-04 | End: 2023-08-14

## 2023-08-04 NOTE — TELEPHONE ENCOUNTER
Spoke with pt she is aware Kelley is out and we do not have anything open. Offered another provider or UC, pt not sure what she wants she will call back

## 2023-08-04 NOTE — TELEPHONE ENCOUNTER
----- Message from Mary Alice Spence sent at 8/4/2023 10:31 AM CDT -----  Contact: [phone]  .1 Patient would like to get medical advice.  Symptoms (please be specific):congestion, soar throat, hot/cold for the past 2 day  How long has patient had these symptoms: 2 days  Pharmacy name and phone#:[facility] [address] [place], [place] [address] [address] [facility]   Phone:  [phone]  Fax:  [phone]  Any drug allergies: on file  Comments: Patient would like to get medical advice. Patient has been offered appointments in person and virtual but would like advise from her NP. Thank you

## 2023-08-04 NOTE — PROGRESS NOTES
"    CHIEF COMPLAINT     Chief Complaint   Patient presents with    Nasal Congestion     Nasal congestion, body aches, shortness of breath, diarrhea, sore throat. Symptoms began Wednesday, has tried taking Vicks sinus medicine and Dayquil.        HPI     Mary Alice Garcia is a 58 y.o. female here today for     Well until Wednesday. Sore throat, HA, congestion, body ache. Feels like symptoms are more intense.   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 Since then 2 other coworkers.    Tried dayquil without improvement.    Personally Reviewed Patient's Medical, surgical, family and social hx. Changes updated in [facility].  Care Team updated in Epic    Review of Systems:  Review of Systems   Constitutional:  Positive for chills, fatigue and fever.   HENT:  Positive for congestion and sore throat.    Respiratory:  Positive for cough. Negative for shortness of breath.    Musculoskeletal:  Positive for myalgias.       Health Maintenance:   Reviewed with patient  Due for the following:      PHYSICAL EXAM     BP (!) 112/90 (BP Location: Right arm, Patient Position: Sitting, BP Method: Large (Manual))   Pulse 74   Temp 98.4 °F (36.9 °C) (Oral)   Ht 5' 3" (1.6 m)   Wt 70.3 kg (154 lb 15.7 oz)   SpO2 99%   BMI 27.45 kg/m²     Gen: Well Appearing, NAD  HEENT: PERR, EOMI  Neck: FROM, no thyromegaly, no cervical adenopathy  CVD: RRR, no M/R/G  Pulm: Normal work of breathing, CTAB, no wheezing  Abd:  Soft, NT, ND non TTP, no mass  MSK: no LE edema  Neuro: A&Ox3, gait normal, speech normal  Mood; Mood normal, behavior normal, thought process linear       LABS     Labs reviewed;   Lab Results   Component Value Date    CREATININE 0.7 07/18/2023    BUN 9 07/18/2023     07/18/2023    K 4.7 07/18/2023     07/18/2023    CO2 25 07/18/2023         ASSESSMENT     1. Upper respiratory tract infection, unspecified type  POCT COVID-19 Rapid Screening    benzonatate (TESSALON) 200 MG capsule              Plan     Mary Alice Garcia is a 58 " y.o. female with  1. Upper respiratory tract infection, unspecified type  - POCT COVID-19 Rapid Screening  - benzonatate (TESSALON) 200 MG capsule; Take 1 capsule (200 mg total) by mouth 3 (three) times daily as needed for Cough.  Dispense: 30 capsule; Refill: 0  Patient with sx consistent w/URI without any red flag sx or RF for more severe infection at this time. Recommend symptomatic relief as below. Recommend presenting for reevaluation for sx that do not improve after 10 days or improvement followed by subsequent worsening.  For myalgias: tylenol or NSAIDS  For congestion: intranasal decongestant such as afrin or sudafed  For drainage and postnasal drip: 1st generation antihistamine such as benadryl, doxylamine or chlorpheniramine  Can use sinus rinse and elevated head of bed to reduce nighttime sx.       Zeferino Herrign MD

## 2023-08-04 NOTE — LETTER
August 4, 2023    Mary Alice Garcia  [address] [facility] [place] [address]             [facility]  [address]  [facility] [address]  Phone: [phone]  Fax: [phone] To Whom This May Concern,    Ms. Mary Alice Garcia was seen and assessed by me on 8/4/2023    Please excuse her from work on 8/4/2023.    she may return to work on 8/7/2023 without restrictions.    Sincerely,        Zeferino Herring MD  Internal Medicine        Zeferino Herring MD

## 2023-08-07 ENCOUNTER — TELEPHONE (OUTPATIENT)
Dept: INTERNAL MEDICINE | Facility: CLINIC | Age: 58
End: 2023-08-07
Payer: COMMERCIAL

## 2023-08-07 NOTE — TELEPHONE ENCOUNTER
----- Message from Caesar Eaton sent at 8/7/2023 10:04 AM CDT -----  Contact: [phone] @ patient  Good morning patient would like a call back from the doc she said the doc told her to call back today if she was not feeling well today. Patient would like a call back [phone]

## 2023-08-07 NOTE — TELEPHONE ENCOUNTER
----- Message from Abran Burks MA sent at 8/7/2023 10:34 AM CDT -----  Contact: [phone] @ patient    ----- Message -----  From: Caesar Eaton  Sent: 8/7/2023  10:11 AM CDT  To: Nima Davidson    Good morning patient would like a call back from the doc she said the doc told her to call back today if she was not feeling well today. Patient would like a call back [phone]

## 2023-08-07 NOTE — TELEPHONE ENCOUNTER
She did not take a home Covid-19 test. Also, not sure why there is a pending Covid test in her chart????

## 2023-08-07 NOTE — TELEPHONE ENCOUNTER
Pt states that over the weekend her symptoms got worse. Her left ear is painful and her throat pain hs increased.she does not thinks she has a fever bur did intensively sweat at night. She states that she was only given something for cough. Her Covid-19 test has not come back.   36

## 2023-08-08 ENCOUNTER — TELEPHONE (OUTPATIENT)
Dept: INTERNAL MEDICINE | Facility: CLINIC | Age: 58
End: 2023-08-08
Payer: COMMERCIAL

## 2023-08-08 NOTE — TELEPHONE ENCOUNTER
----- Message from Aleta Dolan sent at 8/8/2023  3:01 PM CDT -----  Contact: Self/[phone]  1MEDICALADVICE     Patient is calling for Medical Advice regarding: Voice getting bad/Sore Throat     How long has patient had these symptoms:  1 day     Pharmacy name and phone#: [id number] - [place], [place] - [address] [place] [address] [address]VE [address]  [address]  [facility] [address]  Phone: [phone] Fax: [phone]        Would like response via [facility]:  no, patient would like a return call      Comments pt stated that she would like to know what can she take for her symptoms

## 2023-08-08 NOTE — TELEPHONE ENCOUNTER
----- Message from Rima Auguste sent at 8/8/2023  8:02 AM CDT -----  Contact: [phone] @ patient  1MEDICALADVICE     Patient is calling for Medical Advice regarding:Voice getting bad/Covid Patient/Sore Throat     How long has patient had these symptoms:Yesterday    Pharmacy name and phone#:[id number] - [place], [place] - [address]        Would like response via [facility]:  call     Comments:Patient would like to know when she can return back to work/ Patient tested positive as of yesterday

## 2023-08-09 ENCOUNTER — PATIENT MESSAGE (OUTPATIENT)
Dept: INTERNAL MEDICINE | Facility: CLINIC | Age: 58
End: 2023-08-09
Payer: COMMERCIAL

## 2023-08-09 NOTE — TELEPHONE ENCOUNTER
----- Message from Jayna Hatfield MA sent at 8/9/2023 11:00 AM CDT -----  Type:  Patient Returning Call    Who Called: pt   Does the patient know what this is regarding?: yes  Would the patient rather a call back or a response via [facility]? Call back  Best Call Back Number: [phone]  Additional Information:  pt requesting a call back to know when to return to work, pt currently has covid

## 2023-08-09 NOTE — TELEPHONE ENCOUNTER
Called and spoke to pt. Reviewed symptomatic protocol and isolation protocol. Pt verbalized understanding. Pt requesting work excuse to return to work tomorrow, pt states current return to work slip was for Monday which was the day she tested positive for covid.

## 2023-08-09 NOTE — TELEPHONE ENCOUNTER
----- Message from Tammy Pressley sent at 8/9/2023  3:17 PM CDT -----  Contact: [phone]  Pt is requesting an update to see when can she return to work and if so she needs a letter from [employer] for her job.

## 2023-08-10 ENCOUNTER — PATIENT MESSAGE (OUTPATIENT)
Dept: INTERNAL MEDICINE | Facility: CLINIC | Age: 58
End: 2023-08-10
Payer: COMMERCIAL

## 2023-08-10 NOTE — TELEPHONE ENCOUNTER
Please advise of when patient can return to work. I can print up a letter she was seen here in clinic on 8-4-23 tested positive for covid.

## 2023-08-10 NOTE — LETTER
August 11, 2023    Mary Alice Garcia  [address] [facility] [place] [address]             [facility]  [address]  [facility] [address]  Phone: [phone]  Fax: [phone] To Whom This May Concern,    Ms. Mary Alice Garcia was seen and assessed by me on 8/4/2023. She tested positive for covid 19 and I recommended isolation for her in order reduce risk of infecting her colleagues.     she may return to work on 8/11/2023 without restriction         Sincerely,        Zeferino Herring MD  Internal Medicine        Zeferino Herring MD

## 2023-08-14 ENCOUNTER — PATIENT MESSAGE (OUTPATIENT)
Dept: PRIMARY CARE CLINIC | Facility: CLINIC | Age: 58
End: 2023-08-14
Payer: COMMERCIAL

## 2023-08-16 ENCOUNTER — PATIENT MESSAGE (OUTPATIENT)
Dept: PRIMARY CARE CLINIC | Facility: CLINIC | Age: 58
End: 2023-08-16
Payer: COMMERCIAL

## 2023-08-18 NOTE — TELEPHONE ENCOUNTER
Spoke to [facility] pharmacy in [facility] who does not have medication and does not compound, did suggest [facility] in [facility] who stated that they would be able to compound the ingredients. Ask pt if she would want to try [facility] pharmacy?

## 2023-08-18 NOTE — TELEPHONE ENCOUNTER
Can we call her pharmacy and see if they have this or compound this pill    Calcium phosphate tribasic 1200 mg, vit  d3 10,000 IU, zinc acetate 50mg tab, take daily  thanks

## 2023-08-19 ENCOUNTER — HOSPITAL ENCOUNTER (EMERGENCY)
Facility: HOSPITAL | Age: 58
Discharge: HOME | End: 2023-08-19
Attending: FAMILY MEDICINE
Payer: OTHER GOVERNMENT

## 2023-08-19 VITALS
OXYGEN SATURATION: 98 % | TEMPERATURE: 98 F | WEIGHT: 145 LBS | HEART RATE: 76 BPM | SYSTOLIC BLOOD PRESSURE: 140 MMHG | RESPIRATION RATE: 19 BRPM | HEIGHT: 63 IN | DIASTOLIC BLOOD PRESSURE: 91 MMHG | BODY MASS INDEX: 25.69 KG/M2

## 2023-08-19 DIAGNOSIS — M54.42 ACUTE LEFT-SIDED LOW BACK PAIN WITH LEFT-SIDED SCIATICA: Primary | ICD-10-CM
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
## 2023-08-19 PROCEDURE — 99284 EMERGENCY DEPT VISIT MOD MDM: CPT | Mod: ER

## 2023-08-19 PROCEDURE — 25000003 PHARM REV CODE 250: Mod: ER | Performed by: NURSE PRACTITIONER

## 2023-08-19 RX ORDER — GABAPENTIN 300 MG/1
300 CAPSULE ORAL 3 TIMES DAILY
Qty: 15 CAPSULE | Refills: 0 | Status: SHIPPED | OUTPATIENT
Start: 2023-08-19 | End: 2023-09-26

## 2023-08-19 RX ORDER — METHOCARBAMOL 500 MG/1
500 TABLET, FILM COATED ORAL 4 TIMES DAILY
Qty: 40 TABLET | Refills: 0 | Status: SHIPPED | OUTPATIENT
Start: 2023-08-19 | End: 2023-08-29

## 2023-08-19 RX ORDER — KETOROLAC TROMETHAMINE 10 MG/1
10 TABLET, FILM COATED ORAL
Status: COMPLETED | OUTPATIENT
Start: 2023-08-19 | End: 2023-08-19

## 2023-08-19 RX ADMIN — KETOROLAC TROMETHAMINE 10 MG: 10 TABLET, FILM COATED ORAL at 01:08

## 2023-08-19 NOTE — FIRST PROVIDER EVALUATION
Emergency Department TeleTriage Encounter Note      CHIEF COMPLAINT    Chief Complaint   Patient presents with    Back Pain     Pt C/O low back pain following lifting heavy items at work this AM.  Pt reports exacerbation with walking and sitting. NADN       VITAL SIGNS   Initial Vitals [08/19/23 1247]   BP Pulse Resp Temp SpO2   (!) 140/87 73 19 98.4 °F (36.9 °C) 100 %      MAP       --            ALLERGIES    Review of patient's allergies indicates:   Allergen Reactions    Paroxetine hcl Rash and Nausea And Vomiting    Prozac [fluoxetine]        PROVIDER TRIAGE NOTE  This is a teletriage evaluation of a 58 y.o. female presenting to the ED complaining of low back pain after lifting heavy objects this morning. Reports pmhx of herniated disc.  No relief from motrin.     Initial orders will be placed and care will be transferred to an alternate provider when patient is roomed for a full evaluation. Any additional orders and the final disposition will be determined by that provider.         ORDERS  Labs Reviewed - No data to display    ED Orders (720h ago, onward)      Start Ordered     Status Ordering Provider    08/19/23 1300 08/19/23 1256  ketorolac tablet 10 mg  ED 1 Time         Ordered PREET VALLES    08/19/23 1257 08/19/23 1256  X-Ray Lumbar Spine Ap And Lateral  1 time imaging         Ordered PREET VALLES              Virtual Visit Note: The provider triage portion of this emergency department evaluation and documentation was performed via [email or web address], a HIPAA-compliant telemedicine application, in concert with a tele-presenter in the room. A face to face patient evaluation with one of my colleagues will occur once the patient is placed in an emergency department room.      DISCLAIMER: This note was prepared with [employer] voice recognition transcription software. Garbled syntax, mangled pronouns, and other bizarre constructions may be attributed to that software system.

## 2023-08-19 NOTE — ED PROVIDER NOTES
Encounter Date: 2023       History     Chief Complaint   Patient presents with    Back Pain     Pt C/O low back pain following lifting heavy items at work this AM.  Pt reports exacerbation with walking and sitting. MAY       This is a 58-year-old  female  with significant past medical history of herniated disc that presents to ED complaining of an acute exacerbation of her low back pain.  She states she does a lot of heavy lifting at work and felt something pull this morning on the left side of her low back.  The pain has been constant she describes it as burning and shooting with some numbness and tingling.  The pain is rated an 8/10 and radiates all the way down to her left foot.  She denies any bowel or bladder incontinence.      Review of patient's allergies indicates:   Allergen Reactions    Paroxetine hcl Rash and Nausea And Vomiting    Prozac [fluoxetine]      Past Medical History:   Diagnosis Date    Arthritis     Depression     Hyperlipidemia     Hypertension     2013    Syncope and collapse     Thyroid disease     Tuberculosis      Past Surgical History:   Procedure Laterality Date    BUNIONECTOMY Left      SECTION      ESOPHAGOGASTRODUODENOSCOPY  2020    ESOPHAGOGASTRODUODENOSCOPY N/A 2020    Procedure: EGD (ESOPHAGOGASTRODUODENOSCOPY);  Surgeon: Rio Galeano MD;  Location: [facility];  Service: Endoscopy;  Laterality: N/A;    HEMORRHOID SURGERY      KNEE SURGERY      right knee    thyroid removed      TONSILLECTOMY      TUBAL LIGATION       Family History   Problem Relation Age of Onset    Breast cancer Mother     Cancer Mother         breast cancer    Hypertension Mother     Hyperlipidemia Father     Breast cancer Sister     Hypertension Brother     Diabetes Maternal Grandmother     Diabetes Paternal Grandmother     Cancer Maternal Aunt     Drug abuse Maternal Aunt      Social History     Tobacco Use    Smoking status: Never    Smokeless tobacco: Never    Substance Use Topics    Alcohol use: Yes     Alcohol/week: 8.0 standard drinks of alcohol     Types: 3 Glasses of wine, 5 Cans of beer per week    Drug use: No     Review of Systems   Constitutional:  Negative for fever.   Eyes:  Negative for redness.   Respiratory:  Negative for cough and shortness of breath.    Cardiovascular:  Negative for chest pain and palpitations.   Gastrointestinal:  Negative for nausea and vomiting.   Musculoskeletal:  Positive for back pain and gait problem.   Psychiatric/Behavioral:  Negative for agitation and behavioral problems.        Physical Exam     Initial Vitals [08/19/23 1247]   BP Pulse Resp Temp SpO2   (!) 140/87 73 19 98.4 °F (36.9 °C) 100 %      MAP       --         Physical Exam    Constitutional: She appears well-developed and well-nourished.   HENT:   Head: Normocephalic and atraumatic.   Cardiovascular:  Normal rate, regular rhythm and normal heart sounds.           Pulmonary/Chest: Breath sounds normal. She has no wheezes.   Musculoskeletal:      Cervical back: Normal.      Thoracic back: Normal.      Lumbar back: Spasms present. No swelling, edema, signs of trauma or tenderness. Decreased range of motion. Positive left straight leg raise test. Negative right straight leg raise test.     Neurological: She is alert and oriented to person, place, and time. She has normal strength and normal reflexes.   Psychiatric: She has a normal mood and affect. Thought content normal.         ED Course   Procedures  Labs Reviewed - No data to display       Imaging Results              X-Ray Lumbar Spine Ap And Lateral (In process)                      Medications   ketorolac tablet 10 mg (10 mg Oral Given 8/19/23 1312)     Medical Decision Making    This is a 58-year-old  female with known history of herniated disc that presents with an acute exacerbation of her chronic left-sided low back pain.  On arrival the patient is afebrile and nontoxic-appearing.  Please see  physical exam for further details.  The patient had a normal neurological exam with no focal deficits.  Patient had no midline bony tenderness so imaging I do not believe is necessary or appropriate.  I discussed this with the patient and she understands and agrees.  I believe she likely has a left-sided lumbar strain with sciatica.  While I do not believe she needs an emergent MRI, I do believe follow up with Orthopedics and perhaps a nonemergent MRI would be in the patient's best interests to see where things are today versus her original injury in 2021.  At this time I do not believe there is any emergent or life-threatening lumbar injury.  The patient will be written a prescription for gabapentin and Robaxin and instructed to follow up with the PCP in 2-3 days she will be referred to orthopedics for follow up as well.  She is to return to the ED for worsening.  She is also been referred to workman's comp to fill out her work-related paperwork.  All of these matters were discussed at length and the patient verbalized understanding and was agreeable to   The treatment plan.                               Clinical Impression:   Final diagnoses:  [M54.42] Acute left-sided low back pain with left-sided sciatica (Primary)  [Y99.0] Work related injury        ED Disposition Condition    Discharge Stable          ED Prescriptions       Medication Sig Dispense Start Date End Date Auth. Provider    gabapentin (NEURONTIN) 300 MG capsule Take 1 capsule (300 mg total) by mouth 3 (three) times daily. for 5 days 15 capsule 8/19/2023 8/24/2023 Yang Beck PA-C    methocarbamoL (ROBAXIN) 500 MG Tab Take 1 tablet (500 mg total) by mouth 4 (four) times daily. for 10 days 40 tablet 8/19/2023 8/29/2023 Yang Beck PA-C          Follow-up Information       Follow up With Specialties Details Why Contact Info    Kelley Badillo NP Family Medicine In 2 days  [address] [facility]  [address]  [place]  [address]  [phone]      [facility] - Emergency Dept Emergency Medicine  If symptoms worsen [address] [place] [address] [phone]             Yang Beck PA-C  08/19/23 1342

## 2023-08-19 NOTE — Clinical Note
"Mary Alice Negronstacie Garcia was seen and treated in our emergency department on 8/19/2023.  She may return to work on 08/23/2023.       If you have any questions or concerns, please don't hesitate to call.      Yang Beck PA-C"

## 2023-08-21 ENCOUNTER — TELEPHONE (OUTPATIENT)
Dept: PRIMARY CARE CLINIC | Facility: CLINIC | Age: 58
End: 2023-08-21
Payer: COMMERCIAL

## 2023-08-21 NOTE — TELEPHONE ENCOUNTER
Yes, can we call [facility] and give them the instructions:  CALCIUM PHOSPHATE TRIBASIC 1200MG,   VITAMIN D3 10,000 IU,   ZINC ACETATE 50MG    Take 1 tab by mouth daily #90 1 refill

## 2023-08-21 NOTE — TELEPHONE ENCOUNTER
----- Message from Elba Cao sent at 8/21/2023 11:40 AM CDT -----  Contact: [phone]  [email or web address]     Patient is calling for Medical Advice regarding: ED paper work for work    How long has patient had these symptoms:1     Pharmacy name and phone#:  [id number] - [place] - [address] AT [facility]  [address] [address]  Phone: [phone] Fax: [phone]         Would like response via popexpertt:  no    Comments:   Pt states she was hurt on her job and the ED told her the dr should be able to fill out the paper work for her job please advise

## 2023-08-21 NOTE — TELEPHONE ENCOUNTER
Would you like for patient to come in for this and find out what kind of paperwork she needs filled out.

## 2023-08-22 ENCOUNTER — OFFICE VISIT (OUTPATIENT)
Dept: URGENT CARE | Facility: CLINIC | Age: 58
End: 2023-08-22
Payer: OTHER GOVERNMENT

## 2023-08-22 ENCOUNTER — PATIENT MESSAGE (OUTPATIENT)
Dept: PRIMARY CARE CLINIC | Facility: CLINIC | Age: 58
End: 2023-08-22
Payer: COMMERCIAL

## 2023-08-22 ENCOUNTER — TELEPHONE (OUTPATIENT)
Dept: ORTHOPEDICS | Facility: CLINIC | Age: 58
End: 2023-08-22
Payer: COMMERCIAL

## 2023-08-22 VITALS
OXYGEN SATURATION: 98 % | RESPIRATION RATE: 19 BRPM | WEIGHT: 153.13 LBS | BODY MASS INDEX: 27.13 KG/M2 | DIASTOLIC BLOOD PRESSURE: 95 MMHG | TEMPERATURE: 98 F | SYSTOLIC BLOOD PRESSURE: 136 MMHG | HEART RATE: 80 BPM | HEIGHT: 63 IN

## 2023-08-22 DIAGNOSIS — Z02.6 ENCOUNTER RELATED TO WORKER'S COMPENSATION CLAIM: ICD-10-CM

## 2023-08-22 DIAGNOSIS — S39.012A LUMBAR STRAIN, INITIAL ENCOUNTER: Primary | ICD-10-CM

## 2023-08-22 DIAGNOSIS — M54.10 BACK PAIN WITH LEFT-SIDED RADICULOPATHY: ICD-10-CM

## 2023-08-22 PROCEDURE — 99204 PR OFFICE/OUTPT VISIT, NEW, LEVL IV, 45-59 MIN: ICD-10-PCS | Mod: S$GLB,,, | Performed by: PHYSICIAN ASSISTANT

## 2023-08-22 PROCEDURE — 99204 OFFICE O/P NEW MOD 45 MIN: CPT | Mod: S$GLB,,, | Performed by: PHYSICIAN ASSISTANT

## 2023-08-22 PROCEDURE — 72100 XR LUMBAR SPINE 2 OR 3 VIEWS: ICD-10-PCS | Mod: S$GLB,,, | Performed by: RADIOLOGY

## 2023-08-22 PROCEDURE — 72100 X-RAY EXAM L-S SPINE 2/3 VWS: CPT | Mod: S$GLB,,, | Performed by: RADIOLOGY

## 2023-08-22 RX ORDER — METAXALONE 800 MG/1
800 TABLET ORAL 3 TIMES DAILY PRN
Qty: 21 TABLET | Refills: 0 | Status: SHIPPED | OUTPATIENT
Start: 2023-08-22 | End: 2023-08-29

## 2023-08-22 RX ORDER — METHYLPREDNISOLONE 4 MG/1
TABLET ORAL
Qty: 21 EACH | Refills: 0 | Status: SHIPPED | OUTPATIENT
Start: 2023-08-22 | End: 2023-09-12

## 2023-08-22 NOTE — LETTER
Urgent Care - [address] [address]  Phone: [phone]  Fax: [phone]  [facility]: [id number]    Pt Name: Mary Alice Garcia  Injury Date: 08/19/2023   Employee ID: [id number] Date of First Treatment: 08/22/2023   Company: [employer]      Appointment Time: 10:15 AM Arrived: 10:22 AM   Provider: Roberta Solorio PA-C Time Out: 12:22 PM     Office Treatment:   1. Lumbar strain, initial encounter    2. Encounter related to worker's compensation claim    3. Back pain with left-sided radiculopathy      Medications Ordered This Encounter   Medications    metaxalone (SKELAXIN) 800 MG tablet    methylPREDNISolone (MEDROL DOSEPACK) 4 mg tablet      Patient Instructions: Attention not to aggravate affected area, Referral to specialist to be scheduled, once authorized (Please do not take any NSAIDs (ibuprofen, naproxen, aspirin) while taking the steroid dose pack.)      Restrictions:  (See CA17 form.)     Return Appointment: 8/29/2023 at 9:30 AM    TABATHA

## 2023-08-22 NOTE — PROGRESS NOTES
Subjective:      Patient ID: Mary Alice Garcia is a 58 y.o. female.    Chief Complaint: Back Pain (LOWER BACK)    Ms. Garcia presents for evaluation of back injury, DOI 8/19/23.  She is a  with [employer].  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  She believes the trays were over 20lbs.  After about 10 trays, she stopped because she was having LBP with left leg pain.  The pain worsened over the course of the day.  She went to the ED for evaluation after her shift.  No imaging was done, but she was given oral toradol & Rx for neurontin & robaxin with referral to orthopedics.      She has a history of lumbar injury with HNP (she is unsure what level) and chronic back & LLE pain that occurred with [employer] in 2021.  She reports the case is still currently open.  She has done PT and had MAC.  She is not currently being treated by anyone for this, as she recently moved from [place].  She is on permanent restrictions with [employer] at no more than lifting 20 lbs and limited pushing/pulling.  At baseline, she has lumbar back pain with left leg pain & paresthesia that radiates to the toes.  She reports after her injury on 8/19/23, these symptoms were exacerbated.  She denies any new back/leg symptoms.  She does have urinary urgency at baseline, but denies weakness, saddle anesthesia or loss of bowel/bladder.  She has been taking ibuprofen & robaxin with little relief.         See MA note below.  Patient's place of employment - [employer]  Patient's job title -   Date of injury - 8/19/2023  Body part injured including left or right - LOWER BACK WITH SHOOTING PAIN DOWN L LEG. NUMB TOES  Injury Mechanism - PICKING UP AND MOVING BOXES OF MAIL  What they were doing when they got hurt - WORKING   What they did immediately after - [facility] ON [employer]  Pain scale right now - 7/10    KSD    Back Pain  Associated symptoms include numbness.     Constitution: Negative.   HENT: Negative.     Neck: neck  negative.   Cardiovascular: Negative.    Respiratory: Negative.     Musculoskeletal:  Positive for pain, back pain and muscle ache. Negative for joint pain and joint swelling.   Skin: Negative.    Neurological:  Positive for numbness and tingling.     Objective:     Physical Exam  Vitals and nursing note reviewed.   Constitutional:       General: She is not in acute distress.     Appearance: Normal appearance. She is not ill-appearing.   HENT:      Head: Normocephalic and atraumatic.      Right Ear: Tympanic membrane, ear canal and external ear normal. There is no impacted cerumen.      Left Ear: Tympanic membrane, ear canal and external ear normal. There is no impacted cerumen.      Nose: Nose normal. No congestion or rhinorrhea.      Mouth/Throat:      Mouth: Mucous membranes are moist.      Pharynx: No oropharyngeal exudate or posterior oropharyngeal erythema.   Eyes:      Extraocular Movements: Extraocular movements intact.      Conjunctiva/sclera: Conjunctivae normal.      Pupils: Pupils are equal, round, and reactive to light.   Cardiovascular:      Rate and Rhythm: Normal rate and regular rhythm.      Pulses: Normal pulses.      Heart sounds: Normal heart sounds.   Pulmonary:      Effort: Pulmonary effort is normal.      Breath sounds: Normal breath sounds.   Abdominal:      General: Bowel sounds are normal.      Palpations: Abdomen is soft.   Musculoskeletal:      Cervical back: Normal and normal range of motion.      Thoracic back: Normal.      Lumbar back: Tenderness present. No bony tenderness. Decreased range of motion. Positive left straight leg raise test. Negative right straight leg raise test.        Back:       Comments: No step-offs appreciated.  TTP lumbar spine & left paraspinal musculature.  BLE 5/5 HF, KF, KE, DF, PF, EHL.  Sensation intact.   BLE DTR 1+ & symmetric.  Dec ROM with flexion, extension and side bending bilaterally.  All positions elicit pain, mostly extension.  SLR positive  left.         Skin:     General: Skin is warm.      Capillary Refill: Capillary refill takes less than 2 seconds.   Neurological:      General: No focal deficit present.      Mental Status: She is alert and oriented to person, place, and time.   Psychiatric:         Mood and Affect: Mood normal.         Behavior: Behavior normal.         Thought Content: Thought content normal.         Judgment: Judgment normal.        Lumbar XR - Impression:     No significant detrimental interval change in the conventional radiographic appearance of the lumbar spine since 10/21/2020 is appreciated.  No evidence of compression fracture.     Assessment:      1. Lumbar strain, initial encounter    2. Encounter related to worker's compensation claim    3. Back pain with left-sided radiculopathy      Plan:     XR with no change from previous & no acute fractures.  I was able to view previous lumbar XR, but MRI unavailable for review.  Rx written for medrol dose pack.   Discussed risk of steroids with patient, understanding was verbalized.  Rx written for skelaxin PRN.  I discussed precautions of no driving while taking this medication & do not take within 8 hours of the work shift, as the medication may cause drowsiness.  I have placed a referral to orthopedics today.  She will be placed on sit down work only restrictions.   Red flag symptoms discussed & she will immediately go to the ED if she experiences these.  Follow up in 1 week.    Diagnoses and plan discussed with the patient, as well as the expected course and duration of her symptoms.  All questions and concerns were addressed prior to discharge.  Emergency department precautions were given.  Patient verbalized understanding of the plan of care.   Note dictated with voice recognition software, please excuse any grammatical errors.      Medications Ordered This Encounter   Medications    metaxalone (SKELAXIN) 800 MG tablet     Sig: Take 1 tablet (800 mg total) by mouth 3  (three) times daily as needed for Pain.     Dispense:  21 tablet     Refill:  0    methylPREDNISolone (MEDROL DOSEPACK) 4 mg tablet     Sig: use as directed     Dispense:  21 each     Refill:  0     Patient Instructions: Attention not to aggravate affected area, Referral to specialist to be scheduled, once authorized (Please do not take any NSAIDs (ibuprofen, naproxen, aspirin) while taking the steroid dose pack.)   Restrictions:  (See CA17 form.)  Follow up in about 1 week (around 8/29/2023).

## 2023-08-24 ENCOUNTER — TELEPHONE (OUTPATIENT)
Dept: PRIMARY CARE CLINIC | Facility: CLINIC | Age: 58
End: 2023-08-24
Payer: COMMERCIAL

## 2023-08-24 NOTE — TELEPHONE ENCOUNTER
----- Message from Kelley Badillo NP sent at 8/24/2023 10:07 AM CDT -----  [facility]  thanks  ----- Message -----  From: Roberta Solorio PA-C  Sent: 8/22/2023  12:58 PM CDT  To: Kelley Badillo NP

## 2023-08-29 ENCOUNTER — OFFICE VISIT (OUTPATIENT)
Dept: URGENT CARE | Facility: CLINIC | Age: 58
End: 2023-08-29
Payer: OTHER GOVERNMENT

## 2023-08-29 VITALS
HEIGHT: 63 IN | TEMPERATURE: 98 F | DIASTOLIC BLOOD PRESSURE: 96 MMHG | SYSTOLIC BLOOD PRESSURE: 135 MMHG | RESPIRATION RATE: 19 BRPM | HEART RATE: 64 BPM | WEIGHT: 153 LBS | OXYGEN SATURATION: 99 % | BODY MASS INDEX: 27.11 KG/M2

## 2023-08-29 DIAGNOSIS — M54.42 ACUTE LEFT-SIDED LOW BACK PAIN WITH LEFT-SIDED SCIATICA: ICD-10-CM

## 2023-08-29 DIAGNOSIS — Z02.6 ENCOUNTER RELATED TO WORKER'S COMPENSATION CLAIM: Primary | ICD-10-CM

## 2023-08-29 LAB
BILIRUB UR QL STRIP: NEGATIVE
GLUCOSE UR QL STRIP: NEGATIVE
KETONES UR QL STRIP: NEGATIVE
LEUKOCYTE ESTERASE UR QL STRIP: NEGATIVE
PH, POC UA: 5.5 (ref 5–8)
POC BLOOD, URINE: NEGATIVE
POC NITRATES, URINE: NEGATIVE
PROT UR QL STRIP: NEGATIVE
SP GR UR STRIP: 1.01 (ref 1–1.03)
UROBILINOGEN UR STRIP-ACNC: NORMAL (ref 0.1–1.1)

## 2023-08-29 PROCEDURE — 81003 POCT URINALYSIS, DIPSTICK, AUTOMATED, W/O SCOPE: ICD-10-PCS | Mod: QW,S$GLB,, | Performed by: PHYSICIAN ASSISTANT

## 2023-08-29 PROCEDURE — 99214 OFFICE O/P EST MOD 30 MIN: CPT | Mod: S$GLB,,, | Performed by: PHYSICIAN ASSISTANT

## 2023-08-29 PROCEDURE — 99214 PR OFFICE/OUTPT VISIT, EST, LEVL IV, 30-39 MIN: ICD-10-PCS | Mod: S$GLB,,, | Performed by: PHYSICIAN ASSISTANT

## 2023-08-29 PROCEDURE — 81003 URINALYSIS AUTO W/O SCOPE: CPT | Mod: QW,S$GLB,, | Performed by: PHYSICIAN ASSISTANT

## 2023-08-29 RX ORDER — MELOXICAM 15 MG/1
15 TABLET ORAL DAILY
Qty: 30 TABLET | Refills: 0 | Status: SHIPPED | OUTPATIENT
Start: 2023-08-29 | End: 2023-09-11 | Stop reason: ALTCHOICE

## 2023-08-29 NOTE — PROGRESS NOTES
Subjective:      Patient ID: Mary Alice Garcia is a 58 y.o. female.    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 She reports some improvement since last office visit. Says she completed the medrol pack and is taking skelaxin off duty. Pt reports 1 incident of bladder incontinence last Saturday. Says this has happened a few times since her original low back injury in 2021. Reports having previous disc herniation that was treated with MAC and PT. Says her pain more intense with this injury. Says she has an intermittent stabbing pain in the low back with intermittent radiation to LLE (anterior thigh). Pt was referred to ortho spine, but still pending insurance authorization. She denies bowel incontinence or saddle anesthesia. MEB     Back Pain  Associated symptoms include bladder incontinence and numbness. Pertinent negatives include no bowel incontinence or dysuria.     Constitution: Positive for activity change.   HENT:  Negative for facial trauma.    Neck: Negative for neck pain.   Cardiovascular:  Negative for chest trauma.   Eyes:  Negative for eye trauma.   Respiratory:  Negative for shortness of breath.    Gastrointestinal:  Negative for bowel incontinence.   Genitourinary:  Positive for urgency and bladder incontinence. Negative for dysuria and frequency.   Musculoskeletal:  Positive for pain and back pain.   Skin:  Negative for wound.   Neurological:  Positive for numbness and tingling.     Objective:     Physical Exam  Vitals and nursing note reviewed.   Constitutional:       General: She is not in acute distress.     Appearance: Normal appearance. She is well-developed.   HENT:      Head: Normocephalic and  atraumatic.      Right Ear: Hearing and external ear normal.      Left Ear: Hearing and external ear normal.      Nose: Nose normal. No nasal deformity.   Eyes:      General: Lids are normal.      Conjunctiva/sclera: Conjunctivae normal.      Right eye: Right conjunctiva is not injected.      Left eye: Left conjunctiva is not injected.   Neck:      Trachea: Trachea normal.   Cardiovascular:      Pulses: Normal pulses.           Dorsalis pedis pulses are 2+ on the right side and 2+ on the left side.        Posterior tibial pulses are 2+ on the right side and 2+ on the left side.   Pulmonary:      Effort: Pulmonary effort is normal. No respiratory distress.      Breath sounds: No stridor.   Musculoskeletal:      Cervical back: Normal and normal range of motion. No spinous process tenderness or muscular tenderness.      Thoracic back: Normal.      Lumbar back: Tenderness present. No deformity. Decreased range of motion. Positive right straight leg raise test and positive left straight leg raise test.        Back:    Skin:     General: Skin is warm and dry.      Findings: No abrasion or bruising.   Neurological:      General: No focal deficit present.      Mental Status: She is alert.      GCS: GCS eye subscore is 4. GCS verbal subscore is 5. GCS motor subscore is 6.      Sensory: Sensation is intact. No sensory deficit.      Motor: Weakness (Left hip flexion 4/5. RHF, RKE, RKF, URBANO, AJAY, LKF, LKE, LAF, LAE 5/5) present.      Deep Tendon Reflexes:      Reflex Scores:       Patellar reflexes are 0 on the right side and 0 on the left side.       Achilles reflexes are 0 on the right side and 0 on the left side.     Comments: Unable to elicit BLE reflexes   Psychiatric:         Attention and Perception: She is attentive.         Speech: Speech normal.         Behavior: Behavior normal.         Thought Content: Thought content normal.       Results for orders placed or performed in visit on 08/29/23   POCT Urinalysis,  Dipstick, Automated, W/O Scope   Result Value Ref Range    POC Blood, Urine Negative Negative    POC Bilirubin, Urine Negative Negative    POC Urobilinogen, Urine NORMAL 0.1 - 1.1    POC Ketones, Urine Negative Negative    POC Protein, Urine Negative Negative    POC Nitrates, Urine Negative Negative    POC Glucose, Urine Negative Negative    pH, UA 5.5 5 - 8    POC Specific Gravity, Urine 1.015 1.003 - 1.029    POC Leukocytes, Urine Negative Negative        Assessment:      1. Encounter related to worker's compensation claim    2. Acute left-sided low back pain with left-sided sciatica      Plan:     H&P consistent with left L4-L5 sciatica. I have placed an order for PT. Ortho spine referral pending. UA without evidence of infection.   Cauda equina ssx discussed and ED precautions given.       Medications Ordered This Encounter   Medications    meloxicam (MOBIC) 15 MG tablet     Sig: Take 1 tablet (15 mg total) by mouth once daily.     Dispense:  30 tablet     Refill:  0     Patient Instructions: Attention not to aggravate affected area, Daily home exercises/warm soaks, PT to be scheduled once authorized, Referral to specialist to be scheduled, once authorized   Restrictions:  (See CA-17)  Follow up in about 1 week (around 9/5/2023).

## 2023-08-29 NOTE — LETTER
Urgent Care - [address] [address]  Phone: [phone]  Fax: [phone]  [facility]: [id number]    Pt Name: Mary Alice Garcia  Injury Date: 08/19/2023   Employee ID: [id number] Date of Treatment: 08/29/2023   Company: [employer]      Appointment Time: 09:15 AM Arrived: 9:27 AM   Provider: Garrick Hernandez PA-C Time Out: 10:30 AM     Office Treatment:   1. Encounter related to worker's compensation claim    2. Acute left-sided low back pain with left-sided sciatica      Medications Ordered This Encounter   Medications    meloxicam (MOBIC) 15 MG tablet      Patient Instructions: Attention not to aggravate affected area, Daily home exercises/warm soaks, PT to be scheduled once authorized, Referral to specialist to be scheduled, once authorized      Restrictions:  (See CA-17)     Return Appointment: 9/5/2023 at 9:30 AM    TABATHA

## 2023-09-11 ENCOUNTER — PATIENT MESSAGE (OUTPATIENT)
Dept: PRIMARY CARE CLINIC | Facility: CLINIC | Age: 58
End: 2023-09-11
Payer: COMMERCIAL

## 2023-09-11 ENCOUNTER — OFFICE VISIT (OUTPATIENT)
Dept: URGENT CARE | Facility: CLINIC | Age: 58
End: 2023-09-11
Payer: OTHER GOVERNMENT

## 2023-09-11 VITALS
SYSTOLIC BLOOD PRESSURE: 108 MMHG | WEIGHT: 152.13 LBS | DIASTOLIC BLOOD PRESSURE: 81 MMHG | HEART RATE: 87 BPM | HEIGHT: 63 IN | TEMPERATURE: 98 F | BODY MASS INDEX: 26.95 KG/M2 | OXYGEN SATURATION: 97 %

## 2023-09-11 DIAGNOSIS — M54.42 ACUTE LEFT-SIDED LOW BACK PAIN WITH LEFT-SIDED SCIATICA: Primary | ICD-10-CM

## 2023-09-11 DIAGNOSIS — M54.9 DORSALGIA, UNSPECIFIED: ICD-10-CM

## 2023-09-11 DIAGNOSIS — M54.10 BACK PAIN WITH LEFT-SIDED RADICULOPATHY: ICD-10-CM

## 2023-09-11 PROCEDURE — 99214 PR OFFICE/OUTPT VISIT, EST, LEVL IV, 30-39 MIN: ICD-10-PCS | Mod: S$GLB,,, | Performed by: PHYSICIAN ASSISTANT

## 2023-09-11 PROCEDURE — 99214 OFFICE O/P EST MOD 30 MIN: CPT | Mod: S$GLB,,, | Performed by: PHYSICIAN ASSISTANT

## 2023-09-11 RX ORDER — METHOCARBAMOL 500 MG/1
1000 TABLET, FILM COATED ORAL NIGHTLY PRN
Qty: 30 TABLET | Refills: 0 | Status: SHIPPED | OUTPATIENT
Start: 2023-09-11 | End: 2023-09-26 | Stop reason: SDUPTHER

## 2023-09-11 RX ORDER — CELECOXIB 100 MG/1
100 CAPSULE ORAL 2 TIMES DAILY
Qty: 30 CAPSULE | Refills: 0 | Status: SHIPPED | OUTPATIENT
Start: 2023-09-11 | End: 2023-09-26

## 2023-09-11 NOTE — LETTER
Urgent Care - [address] [address]  Phone: [phone]  Fax: [phone]  [facility]: [id number]    Pt Name: Mary Alice Garcia  Injury Date: 08/19/2023   Employee ID-[id number] Date of Treatment: 09/11/2023   Company: [employer]      Appointment Time: 08:15 AM Arrived: 8:07 AM   Provider: Garrick Hernandez PA-C Time Out:9:38 AM     Office Treatment:   1. Acute left-sided low back pain with left-sided sciatica    2. Back pain with left-sided radiculopathy      Medications Ordered This Encounter   Medications    celecoxib (CELEBREX) 100 MG capsule    methocarbamoL (ROBAXIN) 500 MG Tab      Patient Instructions: Daily home exercises/warm soaks, PT to be scheduled once authorized, Referral to specialist to be scheduled, once authorized    Restrictions:  (See CA-17)     Return Appointment: 9/25/2023 at 8:25 AM      RUI

## 2023-09-11 NOTE — PROGRESS NOTES
Subjective:      Patient ID: Mary Alice Garcia is a 58 y.o. female.    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 She reports no significant improvement since last office visit. She has been taking meloxicam which upsets her stomach. Pt is pending authorization for ortho spine and PT referral. Says her supervisor didn't file the paperwork for the WC claim. Says he was supposed to do it last Thursday.   Because I asked, pt endorses intermittent bladder incontinence. Says the last episode happened at work 3 days ago. Denies bowel incontinence or saddle anesthesia. She reports pain in the left low back and buttocks.     Back Pain  Associated symptoms include bladder incontinence and numbness. Pertinent negatives include no bowel incontinence or chest pain.       Constitution: Positive for activity change.   HENT:  Negative for facial trauma.    Neck: Negative for neck pain.   Cardiovascular:  Negative for chest pain.   Respiratory:  Negative for shortness of breath.    Gastrointestinal:  Negative for bowel incontinence.   Genitourinary:  Positive for bladder incontinence.   Musculoskeletal:  Positive for pain and back pain.   Skin:  Negative for wound and bruising.   Neurological:  Positive for numbness and tingling.   Psychiatric/Behavioral:  Positive for sleep disturbance.      Objective:     Physical Exam  Vitals and nursing note reviewed.   Constitutional:       General: She is not in acute distress.     Appearance: Normal appearance. She is well-developed.   HENT:      Head: Normocephalic and atraumatic.      Right Ear: Hearing and external ear normal.      Left Ear: Hearing and external ear normal.      Nose: Nose  normal. No nasal deformity.   Eyes:      General: Lids are normal.      Conjunctiva/sclera: Conjunctivae normal.      Right eye: Right conjunctiva is not injected.      Left eye: Left conjunctiva is not injected.   Neck:      Trachea: Trachea normal.   Cardiovascular:      Pulses: Normal pulses.           Dorsalis pedis pulses are 2+ on the right side and 2+ on the left side.        Posterior tibial pulses are 2+ on the right side and 2+ on the left side.   Pulmonary:      Effort: Pulmonary effort is normal. No respiratory distress.      Breath sounds: No stridor.   Musculoskeletal:      Cervical back: Normal and normal range of motion. No spinous process tenderness or muscular tenderness.      Thoracic back: Normal.      Lumbar back: Tenderness present. No deformity. Decreased range of motion. Positive left straight leg raise test. Negative right straight leg raise test.        Back:    Skin:     General: Skin is warm and dry.      Findings: No abrasion or bruising.   Neurological:      General: No focal deficit present.      Mental Status: She is alert.      GCS: GCS eye subscore is 4. GCS verbal subscore is 5. GCS motor subscore is 6.      Sensory: Sensation is intact. No sensory deficit.      Motor: No weakness.      Deep Tendon Reflexes:      Reflex Scores:       Patellar reflexes are 0 on the right side and 0 on the left side.       Achilles reflexes are 0 on the right side and 0 on the left side.     Comments: Unable to elicit BLE reflexes   Psychiatric:         Attention and Perception: She is attentive.         Speech: Speech normal.         Behavior: Behavior normal.         Thought Content: Thought content normal.        Assessment:      1. Acute left-sided low back pain with left-sided sciatica    2. Back pain with left-sided radiculopathy    3. Dorsalgia, unspecified      Plan:     Stop mobic 2/2 GI problems. Will try celebrex.   H&P consistent with left L4-L5 sciatica. Ortho spine and PT referral  pending.   Pt with intermittent bladder incontinence. Says she had this prior to her injury, but is now more frequent. UA was normal at last office visit. Pt has positive SLR LLE, absent patellar and diminished achilles reflexes bilaterally. Strength is normal BLE.   Plan to order MRI. Discussed strict ED precautions with pt. She verbalized understanding.         Medications Ordered This Encounter   Medications    celecoxib (CELEBREX) 100 MG capsule     Sig: Take 1 capsule (100 mg total) by mouth 2 (two) times daily.     Dispense:  30 capsule     Refill:  0    methocarbamoL (ROBAXIN) 500 MG Tab     Sig: Take 2 tablets (1,000 mg total) by mouth nightly as needed (muscle spasms).     Dispense:  30 tablet     Refill:  0     Patient Instructions: Daily home exercises/warm soaks, PT to be scheduled once authorized, Referral to specialist to be scheduled, once authorized   Restrictions:  (See CA-17)  Follow up in about 1 week (around 9/18/2023).

## 2023-09-14 ENCOUNTER — LAB VISIT (OUTPATIENT)
Dept: LAB | Facility: HOSPITAL | Age: 58
End: 2023-09-14
Attending: NURSE PRACTITIONER
Payer: COMMERCIAL

## 2023-09-14 ENCOUNTER — OFFICE VISIT (OUTPATIENT)
Dept: GASTROENTEROLOGY | Facility: CLINIC | Age: 58
End: 2023-09-14
Payer: COMMERCIAL

## 2023-09-14 VITALS — BODY MASS INDEX: 27.27 KG/M2 | WEIGHT: 153.88 LBS | HEIGHT: 63 IN

## 2023-09-14 DIAGNOSIS — Z12.11 SCREENING FOR COLON CANCER: ICD-10-CM

## 2023-09-14 DIAGNOSIS — K92.1 HEMATOCHEZIA: Primary | ICD-10-CM

## 2023-09-14 DIAGNOSIS — R10.13 EPIGASTRIC PAIN: ICD-10-CM

## 2023-09-14 DIAGNOSIS — K92.1 HEMATOCHEZIA: ICD-10-CM

## 2023-09-14 DIAGNOSIS — K92.0 HEMATEMESIS, UNSPECIFIED WHETHER NAUSEA PRESENT: ICD-10-CM

## 2023-09-14 DIAGNOSIS — K21.9 GASTROESOPHAGEAL REFLUX DISEASE, UNSPECIFIED WHETHER ESOPHAGITIS PRESENT: ICD-10-CM

## 2023-09-14 LAB
ERYTHROCYTE [DISTWIDTH] IN BLOOD BY AUTOMATED COUNT: 12.3 % (ref 11.5–14.5)
HCT VFR BLD AUTO: 41.3 % (ref 37–48.5)
HGB BLD-MCNC: 14 G/DL (ref 12–16)
MCH RBC QN AUTO: 29.3 PG (ref 27–31)
MCHC RBC AUTO-ENTMCNC: 33.9 G/DL (ref 32–36)
MCV RBC AUTO: 86 FL (ref 82–98)
PLATELET # BLD AUTO: 266 K/UL (ref 150–450)
PMV BLD AUTO: 10.8 FL (ref 9.2–12.9)
RBC # BLD AUTO: 4.78 M/UL (ref 4–5.4)
WBC # BLD AUTO: 11.17 K/UL (ref 3.9–12.7)

## 2023-09-14 PROCEDURE — 99999 PR PBB SHADOW E&M-EST. PATIENT-LVL III: ICD-10-PCS | Mod: PBBFAC,,, | Performed by: NURSE PRACTITIONER

## 2023-09-14 PROCEDURE — 85027 COMPLETE CBC AUTOMATED: CPT | Performed by: NURSE PRACTITIONER

## 2023-09-14 PROCEDURE — 3044F HG A1C LEVEL LT 7.0%: CPT | Mod: CPTII,S$GLB,, | Performed by: NURSE PRACTITIONER

## 2023-09-14 PROCEDURE — 3008F PR BODY MASS INDEX (BMI) DOCUMENTED: ICD-10-PCS | Mod: CPTII,S$GLB,, | Performed by: NURSE PRACTITIONER

## 2023-09-14 PROCEDURE — 3044F PR MOST RECENT HEMOGLOBIN A1C LEVEL <7.0%: ICD-10-PCS | Mod: CPTII,S$GLB,, | Performed by: NURSE PRACTITIONER

## 2023-09-14 PROCEDURE — 1159F PR MEDICATION LIST DOCUMENTED IN MEDICAL RECORD: ICD-10-PCS | Mod: CPTII,S$GLB,, | Performed by: NURSE PRACTITIONER

## 2023-09-14 PROCEDURE — 36415 COLL VENOUS BLD VENIPUNCTURE: CPT | Performed by: NURSE PRACTITIONER

## 2023-09-14 PROCEDURE — 99214 OFFICE O/P EST MOD 30 MIN: CPT | Mod: S$GLB,,, | Performed by: NURSE PRACTITIONER

## 2023-09-14 PROCEDURE — 99999 PR PBB SHADOW E&M-EST. PATIENT-LVL III: CPT | Mod: PBBFAC,,, | Performed by: NURSE PRACTITIONER

## 2023-09-14 PROCEDURE — 1159F MED LIST DOCD IN RCRD: CPT | Mod: CPTII,S$GLB,, | Performed by: NURSE PRACTITIONER

## 2023-09-14 PROCEDURE — 99214 PR OFFICE/OUTPT VISIT, EST, LEVL IV, 30-39 MIN: ICD-10-PCS | Mod: S$GLB,,, | Performed by: NURSE PRACTITIONER

## 2023-09-14 PROCEDURE — 3008F BODY MASS INDEX DOCD: CPT | Mod: CPTII,S$GLB,, | Performed by: NURSE PRACTITIONER

## 2023-09-14 RX ORDER — SODIUM, POTASSIUM,MAG SULFATES 17.5-3.13G
1 SOLUTION, RECONSTITUTED, ORAL ORAL DAILY
Qty: 1 KIT | Refills: 0 | Status: SHIPPED | OUTPATIENT
Start: 2023-09-14 | End: 2024-02-26

## 2023-09-14 RX ORDER — SUCRALFATE 1 G/1
1 TABLET ORAL 3 TIMES DAILY
Qty: 90 TABLET | Refills: 2 | Status: SHIPPED | OUTPATIENT
Start: 2023-09-14 | End: 2023-12-13

## 2023-09-14 RX ORDER — PANTOPRAZOLE SODIUM 40 MG/1
40 TABLET, DELAYED RELEASE ORAL 2 TIMES DAILY
Qty: 60 TABLET | Refills: 11 | Status: SHIPPED | OUTPATIENT
Start: 2023-09-14 | End: 2024-02-26

## 2023-09-14 NOTE — PATIENT INSTRUCTIONS
SUPREP Instructions    [facility] [address]  Clinic Office [phone]  Endoscopy Lab [phone]    You are scheduled for a Colonoscopy with Dr. De  on Oct 25, 2023 at [facility] in [place].    Check in at the Hospital -1st floor, Information desk.   Call [phone] to reschedule.    An adult friend/family member must come with you to drive you home.  You cannot drive, take a taxi, Uber/Lyft or bus to leave the Endoscopy Center alone.  If you do not have someone to drive you home, your test will be cancelled.     Please follow the directions of your doctor if you take any pills that thin your blood. If you take these meds: Aggrenox, Brilinta, Effient, Eliquis, Lovenox, Plavix, Pletal, Pradaxa, Ticilid, Xarelto or Coumadin, let the doctor's office know.    Please hold any GLP-1 medications prior to the procedure: Dulaglutide Trulicity(hold week prior), Exenatide Byetta (hold the morning of procedure), Semaglutide Ozempic (hold week prior), Liraglutide Victoza, Saxenda(hold week prior), Lixisenatide Adlyxin (hold the morning of procedure), Semaglutide Rybelsus (hold the morning of procedure), Tirzepatide Mounjaro (hold week prior)     DON'T: On the morning of the test do not take insulin or pills for diabetes.     DO: On the morning of the test, do take any pills for blood pressure, heart, anti-rejection and or seizures with a small sip of water. Bring any inhalers with you.    To have a good prep, you must follow these instructions - please do not use the directions from the pharmacy.    The doctor will send a prescription for the SUPREP.      The Day Before the test:    You can only drink CLEAR LIQUIDS the whole day before your test.  You can't eat any food for the whole day.    You CAN have:  Water, Coffee or decaf coffee (no milk or cream)  Tea  Soft drinks - regular and sugar free  Jello (green or yellow)  Apple Juice, white grape juice, white cranberry  juice  Gatorade, Power Aid, Crystal Light, Igor Aid  Lemonade and Limeade  Bouillon, clear soup  Snowball, popsicles  YOU CAN'T DRINK ANYTHING RED, PURPLE ORANGE OR BLUE   YOU CAN'T DRINK ALCOHOL  ONLY DRINK WHAT IS ON THE LIST      At 5 pm the night before your test:    Pour the 1st bottle of SUPREP into the cup provided in the box. Add water to the line on the cup and mix well.  Drink the whole cup and then drink 2 more full cups of water over 1 hour.  This can be easier to drink if it is cold. You can mix it 20 minutes ahead of time and place in the refrigerator before you drink it.  You must drink it within 30-45 minutes of mixing it.  Do NOT pour the drink over ice.  You can drink it with a straw.    The Day of the test - We will call you 2 days before your test to tell you what time to get there.    5 hours before you come to the hospital (this may be in the middle of the night)  Pour the 2nd bottle of SUPREP into the cup provided in the box. Add water to the line on the cup and mix well.  Drink the whole cup and then drink 2 more full cups of water over 1 hour.  It might be easier to drink if it is cold. You can mix it 20 minutes ahead of time and place in the refrigerator before you drink it.  You must drink it within 30-45 minutes of mixing it.  Do NOT pour the drink over ice.  You can drink it with a straw.    YOU CAN'T EAT OR DRINK ANYTHING ELSE ONCE YOU FINISH THE PREP    Leave all valuables and jewelry at home. You will be at the hospital for 2-4 hours.    Call the Endoscopy department at [phone] with any questions about your procedure.

## 2023-09-14 NOTE — PROGRESS NOTES
GASTROENTEROLOGY CLINIC NOTE    Chief Complaint: The primary encounter diagnosis was Hematochezia. Diagnoses of Screening for colon cancer, Gastroesophageal reflux disease, unspecified whether esophagitis present, Hematemesis, unspecified whether nausea present, and Epigastric pain were also pertinent to this visit.  Referring provider/PCP: Kelley Badillo NP    HPI:  Mary Alice Garcia is a 58 y.o. female who is a new patient to me with a PMH that is significant for Arthritis, Depression, Hyperlipidemia, Hypertension, Syncope and collapse, Thyroid disease, and Tuberculosis.  Shee is here today to establish care for GERD and bloating.  This is a new problem that has been intermittent over the last three months.  She reports her symptoms would come an go over the last three months but have worsened over the last few weeks and are occurring daily.  She reports epigastric pain, bloated, feeling full, water brash, occasional dysphagia, and occasional regurgitation. She currently denies nocturnal symptoms but reports it has occurred in the past. Symptoms occur approximately 30-45 minutes following meals.  She has tried Alkaseltzer, Pepto Bismol,and Rolaids. She is currently taking Protonix 20mg and reports it is not helping with her symptoms. She denies hoarseness, coughing, or constant throat clearing. She denies late meals and NSAID usage. She does endorse occasional caffeine intake and reports daily ETOH use of 12 ox beer. She does also report to me she has a h/o constipation but is controlled with OTC stool softeners. Denies melena, hematochezia, changes in bowel habits, or unexplained weight loss.     Interval Note 9/14/2023  Ms. Mary Alice Garcia who is known to me presents to clinic for vomiting, abdominal pain, and hematochezia.   Hematochezia first occurred about a month ago then improved and returned about two weeks ago.   2-3 weeks ago had episode of vomiting. Vomited several times throughout the day. Vomitus  consisted of mostly bile but noted bright red blood twice followed by blood that was darker in color. Denies coffee ground emesis.   Frequent heartburn and indigestion. Has tried Emily-Manilla and Mylanta.   Significant NSAID use (ibuprofen 800mg) d/t back pain.     Blood in Stool  How Long: Last occurred about two weeks ago   Frequency: Occurring 1-2 times a month   Melena/Hematochezia: hematochezia   Blood in Stool/Toilet Paper/Toilet Bowel: In stool and on toilet paper.    Straining with Bowel Movements: no   Bowel Movements: Daily  Taking Vitamin D compound helps with bowel movements   Changes in Bowel Habits/Unexplained Weight Loss: no   Abdominal/Rectal Pain: Epigastric Pain worse with Eating  Accompanied by bloating   H/O Hemorrhoids:  Several years ago   Iron Supplements/Pepto Bismol/Blood Thinners: no       Treatments: Alkaseltzer, Mylanta, Baking Soda Water    Prior Upper Endoscopy: 11/2020 with [facility]  Impression:           - No endoscopic esophageal abnormality to explain                         patient's dysphagia. Esophagus dilated. Dilated.                         Biopsied.                         - Gastric mucosal atrophy. Biopsied.                         - Normal examined duodenum.     Recommendation:       - Discharge patient to home.                         - Patient has a contact number available for                         emergencies. The signs and symptoms of potential                         delayed complications were discussed with the                         patient. Return to normal activities tomorrow.                         Written discharge instructions were provided to the                         patient.                         - Resume previous diet.                         - Continue present medications.                         - Await pathology results.                         - Return to nurse practitioner PRN.     Pathology:  1.  STOMACH, BIOPSY:   Gastric fundic and antral  mucosa with inactive chronic gastritis.   An immunostain for H pylori is pending.   Negative for intestinal metaplasia, dysplasia or malignancy.   2.  DISTAL ESOPHAGUS, BIOPSY:   Esophageal squamous mucosa without significant pathologic changes.   Negative for eosinophils, intestinal metaplasia, dysplasia or malignancy.   3.  MID ESOPHAGUS, BIOPSY:   Esophageal squamous mucosa without significant pathologic changes.   Negative for eosinophils, intestinal metaplasia, dysplasia or malignancy.     Prior Colonoscopy:    Family h/o Colon Cancer: Paternal Grandfather  Family h/o Crohn's Disease or Ulcerative Colitis: No  Abdominal Surgeries: No    GLP-1: No  Anticoagulation or Antiplatelet: No    Review of Systems   Constitutional:  Negative for weight loss.   HENT:  Negative for sore throat.    Eyes:  Negative for blurred vision.   Respiratory:  Negative for cough.    Cardiovascular:  Negative for chest pain.   Gastrointestinal:  Positive for abdominal pain (Epigastric), blood in stool, heartburn and vomiting. Negative for constipation, diarrhea, melena and nausea.   Genitourinary:  Negative for dysuria.   Musculoskeletal:  Negative for myalgias.   Skin:  Negative for rash.   Neurological:  Negative for headaches.   Endo/Heme/Allergies:  Negative for environmental allergies.   Psychiatric/Behavioral:  Negative for suicidal ideas. The patient is not nervous/anxious.        Past Medical History: has a past medical history of Arthritis, Depression, Hyperlipidemia, Hypertension, Syncope and collapse, Thyroid disease, and Tuberculosis.    Past Surgical History: has a past surgical history that includes  section; Tonsillectomy; Tubal ligation; Knee surgery; Hemorrhoid surgery; thyroid removed; Esophagogastroduodenoscopy (2020); Esophagogastroduodenoscopy (N/A, 2020); and Bunionectomy (Left).    Family History:family history includes Breast cancer in her mother and sister; Cancer in her maternal aunt and  mother; Diabetes in her maternal grandmother and paternal grandmother; Drug abuse in her maternal aunt; Hyperlipidemia in her father; Hypertension in her brother and mother.    Allergies:   Review of patient's allergies indicates:   Allergen Reactions    Paroxetine hcl Rash and Nausea And Vomiting       Social History: reports that she has never smoked. She has never been exposed to tobacco smoke. She has never used smokeless tobacco. She reports current alcohol use of about 8.0 standard drinks of alcohol per week. She reports that she does not use drugs.    Home medications:   Current Outpatient Medications on File Prior to Visit   Medication Sig Dispense Refill    calcium carbonate (OS-FLO) 500 mg calcium (1,250 mg) tablet Take 2 tablets by mouth 2 (two) times daily.        celecoxib (CELEBREX) 100 MG capsule Take 1 capsule (100 mg total) by mouth 2 (two) times daily. 30 capsule 0    cholecalciferol, vitamin D3, 125 mcg (5,000 unit) capsule Take 5,000 Units by mouth once daily.      fish oil-omega-3 fatty acids 300-1,000 mg capsule Take 2 g by mouth once daily.      levothyroxine (SYNTHROID) 137 MCG Tab tablet Take 1 tablet (137 mcg total) by mouth before breakfast. 90 tablet 3    methocarbamoL (ROBAXIN) 500 MG Tab Take 2 tablets (1,000 mg total) by mouth nightly as needed (muscle spasms). 30 tablet 0    norethindrone ac-eth estradioL (FEMHRT LOW DOSE) 0.5-2.5 mg-mcg per tablet Take 1 tablet by mouth once daily. 90 tablet 3    propranoloL (INDERAL) 60 MG tablet Take 1 tablet (60 mg total) by mouth 2 (two) times daily. 180 tablet 3    gabapentin (NEURONTIN) 300 MG capsule Take 1 capsule (300 mg total) by mouth 3 (three) times daily. for 5 days 15 capsule 0    [DISCONTINUED] ondansetron (ZOFRAN-ODT) 4 MG TbDL Dissolve 1 tablet (4 mg total) by mouth every 8 (eight) hours as needed (nausea). (Patient not taking: Reported on 9/14/2023) 30 tablet 0     No current facility-administered medications on file prior to visit.  "      Vital signs:  Ht 5' 3" (1.6 m)   Wt 69.8 kg (153 lb 14.1 oz)   BMI 27.26 kg/m²     Physical Exam  Vitals reviewed.   Constitutional:       General: She is not in acute distress.     Appearance: Normal appearance. She is not ill-appearing.   HENT:      Head: Normocephalic.   Cardiovascular:      Rate and Rhythm: Normal rate and regular rhythm.      Heart sounds: Normal heart sounds. No murmur heard.  Pulmonary:      Effort: Pulmonary effort is normal. No respiratory distress.      Breath sounds: Normal breath sounds.   Chest:      Chest wall: No tenderness.   Abdominal:      General: Bowel sounds are normal. There is no distension.      Palpations: Abdomen is soft.      Tenderness: There is no abdominal tenderness. Negative signs include Samuel's sign.      Hernia: No hernia is present.      Comments: JACINTO declined by patient as one will be performed with colonoscopy.    Skin:     General: Skin is warm.   Neurological:      Mental Status: She is alert and oriented to person, place, and time.   Psychiatric:         Mood and Affect: Mood normal.         Behavior: Behavior normal.         Thought Content: Thought content normal.         Routine labs:  Lab Results   Component Value Date    WBC 9.54 07/18/2023    HGB 13.5 07/18/2023    HCT 41.0 07/18/2023    MCV 90 07/18/2023     07/18/2023     No results found for: "INR"  No results found for: "IRON", "FERRITIN", "TIBC", "FESATURATED"  Lab Results   Component Value Date     07/18/2023    K 4.7 07/18/2023     07/18/2023    CO2 25 07/18/2023    BUN 9 07/18/2023    CREATININE 0.7 07/18/2023     Lab Results   Component Value Date    ALBUMIN 3.9 07/18/2023    ALT 21 07/18/2023    AST 24 07/18/2023    ALKPHOS 76 07/18/2023    BILITOT 0.4 07/18/2023     No results found for: "GLUCOSE"  Lab Results   Component Value Date    TSH 0.185 (L) 07/11/2023     Lab Results   Component Value Date    CALCIUM 9.6 07/18/2023       Imaging:      I have reviewed prior " labs, imaging, and notes.      Assessment:  1. Hematochezia    2. Screening for colon cancer    3. Gastroesophageal reflux disease, unspecified whether esophagitis present    4. Hematemesis, unspecified whether nausea present    5. Epigastric pain      Blood noted in stool and with wiping. Denies constipation or straining with bowel movements.   Worsening heartburn and indigestion accompanied by epigastric pain that is worse with eating.   Episode of vomiting; bright red blood noted in vomitus.   Taking ibuprofen 800mg for back pain.   Due for screening colonoscopy.       Plan:  Orders Placed This Encounter    CBC Without Differential    sodium,potassium,mag sulfates (SUPREP BOWEL PREP KIT) 17.5-3.13-1.6 gram SolR    pantoprazole (PROTONIX) 40 MG tablet    sucralfate (CARAFATE) 1 gram tablet    Case Request Endoscopy: COLONOSCOPY, EGD (ESOPHAGOGASTRODUODENOSCOPY)     CBC  Protonix 40mg BID  Carafate 1g TID  EGD d/t c/o hematemesis  Colonoscopy for colon cancer screening. Suprep.   Avoid NSAIDs. If taking NSAIDs recommend taking PPI to protect gastric lining.   Consider decreasing Protonix to daily after 12 weeks pending EGD findings and symptoms       Plan of care discussed with patient who is in agreement and verbalized understanding.           JAM Kyle,FNP-BC  [facility]

## 2023-09-14 NOTE — LETTER
September 14, 2023      [facility]  [address], [address]  [place] [address]  Phone: [phone]       Patient: Mary Alice Garcia   YOB: 1965  Date of Visit: 09/14/2023    To Whom It May Concern:    Anuel Garcia  was at [facility] on 09/14/2023. The patient may return to work on 09/15/23 with no restrictions. If you have any questions or concerns, or if I can be of further assistance, please do not hesitate to contact me.        Sincerely,    Gavin Gonzales MA

## 2023-09-18 ENCOUNTER — OFFICE VISIT (OUTPATIENT)
Dept: URGENT CARE | Facility: CLINIC | Age: 58
End: 2023-09-18
Payer: OTHER GOVERNMENT

## 2023-09-18 VITALS
TEMPERATURE: 98 F | OXYGEN SATURATION: 99 % | SYSTOLIC BLOOD PRESSURE: 121 MMHG | BODY MASS INDEX: 26.95 KG/M2 | DIASTOLIC BLOOD PRESSURE: 85 MMHG | WEIGHT: 152.13 LBS | HEART RATE: 69 BPM | HEIGHT: 63 IN

## 2023-09-18 DIAGNOSIS — M54.10 BACK PAIN WITH LEFT-SIDED RADICULOPATHY: Primary | ICD-10-CM
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
## 2023-09-18 DIAGNOSIS — Z02.6 ENCOUNTER RELATED TO WORKER'S COMPENSATION CLAIM: ICD-10-CM

## 2023-09-18 DIAGNOSIS — M54.42 ACUTE LEFT-SIDED LOW BACK PAIN WITH LEFT-SIDED SCIATICA: ICD-10-CM

## 2023-09-18 PROCEDURE — 99213 OFFICE O/P EST LOW 20 MIN: CPT | Mod: S$GLB,,, | Performed by: PHYSICIAN ASSISTANT

## 2023-09-18 PROCEDURE — 99213 PR OFFICE/OUTPT VISIT, EST, LEVL III, 20-29 MIN: ICD-10-PCS | Mod: S$GLB,,, | Performed by: PHYSICIAN ASSISTANT

## 2023-09-18 NOTE — PROGRESS NOTES
Subjective:      Patient ID: Mary Alice Garcia is a 58 y.o. female.    Chief Complaint: Back Pain (LOWER BACK)    KW  Patient's place of employment - [employer]  Patient's job title -   Date of Injury - 08/19/2023  Body part injured - LOWER BACK  Current work status per last visit - LIGHT  Improved, same, or worse - SAME  Pain Scale right now (1-10) -  7    Pt presents for f/u LBP with left sided sciatica. She reports no improvement since last office visit. She finally has a claim number for WC injury. Pt reports persistent LBP with radiation to left hip. She had one episode of urinary incontinence [place] the movie [place]. She did not go to the ED as discussed. She has not had any other episodes of incontinence. She is taking robaxin prn. She did not get celebrex filled as her pharmacy did not have it in stock. MEB    Back Pain  Associated symptoms include bladder incontinence and numbness. Pertinent negatives include no bowel incontinence.       Constitution: Positive for activity change.   HENT:  Negative for facial trauma.    Neck: Negative for neck pain.   Gastrointestinal:  Negative for bowel incontinence.   Genitourinary:  Positive for bladder incontinence.   Musculoskeletal:  Positive for back pain.   Neurological:  Positive for numbness and tingling.     Objective:     Physical Exam  Vitals and nursing note reviewed.   Constitutional:       General: She is not in acute distress.     Appearance: Normal appearance. She is well-developed.   HENT:      Head: Normocephalic and atraumatic.      Right Ear: Hearing and external ear normal.      Left Ear: Hearing and external ear normal.      Nose: Nose normal. No nasal deformity.   Eyes:      General: Lids are normal.      Conjunctiva/sclera: Conjunctivae normal.      Right eye: Right conjunctiva is not injected.      Left eye: Left conjunctiva is not injected.   Neck:      Trachea: Trachea normal.   Cardiovascular:      Pulses: Normal pulses.            Dorsalis pedis pulses are 2+ on the right side and 2+ on the left side.        Posterior tibial pulses are 2+ on the right side and 2+ on the left side.   Pulmonary:      Effort: Pulmonary effort is normal. No respiratory distress.      Breath sounds: No stridor.   Musculoskeletal:      Cervical back: Normal and normal range of motion. No spinous process tenderness or muscular tenderness.      Thoracic back: Normal.      Lumbar back: Tenderness present. No deformity. Normal range of motion. Positive left straight leg raise test. Negative right straight leg raise test.        Back:    Skin:     General: Skin is warm and dry.      Findings: No abrasion or bruising.   Neurological:      General: No focal deficit present.      Mental Status: She is alert.      GCS: GCS eye subscore is 4. GCS verbal subscore is 5. GCS motor subscore is 6.      Sensory: Sensation is intact. No sensory deficit.      Motor: No weakness.      Deep Tendon Reflexes:      Reflex Scores:       Patellar reflexes are 0 on the right side and 0 on the left side.       Achilles reflexes are 0 on the right side and 0 on the left side.     Comments: Unable to elicit BLE reflexes   Psychiatric:         Attention and Perception: She is attentive.         Speech: Speech normal.         Behavior: Behavior normal.         Thought Content: Thought content normal.      Assessment:      1. Back pain with left-sided radiculopathy    2. Encounter related to worker's compensation claim    3. Acute left-sided low back pain with left-sided sciatica    4. Work related injury      Plan:       Claim # sent to [facility] to expedite MRI, PT and ortho referral.   Pt with persistent LBP with radiation to LLE, occasional episodes of bladder incontinence.   Pt has had bladder incontinence in the past, previous to current injury. I doubt cauda equina syndrome as incontinence occurs 1-2 times/ week. Discussed ED precautions. Pt verbalized understanding.   Encouraged warm soaks,  home stretches. Continue light duty. RTC 1 week.        Patient Instructions: Attention not to aggravate affected area, Daily home exercises/warm soaks, MRI to be scheduled once authorized, PT to be scheduled once authorized, Referral to specialist to be scheduled, once authorized   Restrictions:  (See CA-17)  Follow up in about 1 week (around 9/25/2023).

## 2023-09-18 NOTE — LETTER
Urgent Care - [address] [address]  Phone: [phone]  Fax: [phone]  [facility]: [id number]    Pt Name: Mary Alice Garcia  Injury Date: 08/19/2023   Employee ID: [id number] Date of Treatment: 09/18/2023   Company: [employer]      Appointment Time: 08:15 AM Arrived: 8:35 AM   Provider: Garrick Hernandez PA-C Time Out:9:38 AM     Office Treatment:   1. Back pain with left-sided radiculopathy    2. Encounter related to worker's compensation claim    3. Acute left-sided low back pain with left-sided sciatica    4. Work related injury          Patient Instructions: Attention not to aggravate affected area, Daily home exercises/warm soaks, MRI to be scheduled once authorized, PT to be scheduled once authorized, Referral to specialist to be scheduled, once authorized    Restrictions:  (See CA-17)     Return Appointment: 9/25/2023 at 8:30 AM    KW

## 2023-09-21 ENCOUNTER — PATIENT MESSAGE (OUTPATIENT)
Dept: OBSTETRICS AND GYNECOLOGY | Facility: CLINIC | Age: 58
End: 2023-09-21
Payer: COMMERCIAL

## 2023-09-21 ENCOUNTER — PATIENT MESSAGE (OUTPATIENT)
Dept: ENDOCRINOLOGY | Facility: CLINIC | Age: 58
End: 2023-09-21
Payer: COMMERCIAL

## 2023-09-26 ENCOUNTER — OFFICE VISIT (OUTPATIENT)
Dept: URGENT CARE | Facility: CLINIC | Age: 58
End: 2023-09-26
Payer: OTHER GOVERNMENT

## 2023-09-26 VITALS
OXYGEN SATURATION: 99 % | WEIGHT: 152.13 LBS | TEMPERATURE: 98 F | HEART RATE: 67 BPM | DIASTOLIC BLOOD PRESSURE: 87 MMHG | BODY MASS INDEX: 26.95 KG/M2 | HEIGHT: 63 IN | SYSTOLIC BLOOD PRESSURE: 126 MMHG

## 2023-09-26 DIAGNOSIS — M54.10 BACK PAIN WITH LEFT-SIDED RADICULOPATHY: ICD-10-CM

## 2023-09-26 DIAGNOSIS — Z02.6 ENCOUNTER RELATED TO WORKER'S COMPENSATION CLAIM: Primary | ICD-10-CM

## 2023-09-26 PROCEDURE — 99213 PR OFFICE/OUTPT VISIT, EST, LEVL III, 20-29 MIN: ICD-10-PCS | Mod: S$GLB,,, | Performed by: PHYSICIAN ASSISTANT

## 2023-09-26 PROCEDURE — 99213 OFFICE O/P EST LOW 20 MIN: CPT | Mod: S$GLB,,, | Performed by: PHYSICIAN ASSISTANT

## 2023-09-26 RX ORDER — CELECOXIB 200 MG/1
200 CAPSULE ORAL 2 TIMES DAILY
Qty: 30 CAPSULE | Refills: 1 | Status: SHIPPED | OUTPATIENT
Start: 2023-09-26 | End: 2023-11-07

## 2023-09-26 RX ORDER — METHOCARBAMOL 500 MG/1
1000 TABLET, FILM COATED ORAL NIGHTLY PRN
Qty: 30 TABLET | Refills: 0 | Status: SHIPPED | OUTPATIENT
Start: 2023-09-26 | End: 2023-10-09 | Stop reason: SDUPTHER

## 2023-09-26 NOTE — LETTER
Urgent Care - [address] [address]  Phone: [phone]  Fax: [phone]  [facility]: [id number]    Pt Name: Mary Alice Garcia  Injury Date: 08/19/2023   Employee ID: [id number] Date of Treatment: 09/26/2023   Company: [employer]      Appointment Time: 10:45 AM Arrived: 10:58 AM   Provider: Garrick Hernandez PA-C Time Out:12:42 PM     Office Treatment:   1. Encounter related to worker's compensation claim    2. Back pain with left-sided radiculopathy      Medications Ordered This Encounter   Medications    celecoxib (CELEBREX) 200 MG capsule    methocarbamoL (ROBAXIN) 500 MG Tab      Patient Instructions: Attention not to aggravate affected area, Daily home exercises/warm soaks, PT to be scheduled once authorized, MRI to be scheduled once authorized, Referral to specialist to be scheduled, once authorized    Restrictions:  (See CA-17)     Return Appointment: 10/10/2023 at 8:15 AM    RUI

## 2023-09-26 NOTE — PROGRESS NOTES
Subjective:      Patient ID: Mary Alice Garcia is a 58 y.o. female.    Chief Complaint: Back Pain (LOWER BACK)    KW  Patient's place of employment - [employer]  Patient's job title -    Date of Injury - 08-  Body part injured - LOWER BACK  Current work status per last visit - LIGHT  Improved, same, or worse - SAME  Pain Scale right now (1-10) -  7      Patient presents for follow-up acute low back pain with left-sided sciatica.  She reports no significant improvement since last office visit.  Patient is currently working light duty.  She denies any new episodes of bladder incontinence.  She denies bowel incontinence or saddle anesthesia.  Referrals for PT, ortho spine and MRI still pending authorization. MEB    Back Pain  Associated symptoms include numbness. Pertinent negatives include no bladder incontinence or bowel incontinence.       Constitution: Positive for activity change.   Gastrointestinal:  Negative for bowel incontinence.   Genitourinary:  Negative for bladder incontinence.   Musculoskeletal:  Positive for pain and back pain.   Neurological:  Positive for numbness and tingling.     Objective:     Physical Exam  Vitals and nursing note reviewed.   Constitutional:       General: She is not in acute distress.     Appearance: Normal appearance. She is well-developed.   HENT:      Head: Normocephalic and atraumatic.      Right Ear: Hearing and external ear normal.      Left Ear: Hearing and external ear normal.      Nose: Nose normal. No nasal deformity.   Eyes:      General: Lids are normal.      Conjunctiva/sclera: Conjunctivae normal.      Right eye: Right conjunctiva is not injected.      Left eye: Left conjunctiva is not injected.   Neck:      Trachea: Trachea normal.   Cardiovascular:      Pulses: Normal pulses.           Dorsalis pedis pulses are 2+ on the right side and 2+ on the left side.        Posterior tibial pulses are 2+ on the right side and 2+ on the left side.   Pulmonary:      Effort:  Pulmonary effort is normal. No respiratory distress.      Breath sounds: No stridor.   Musculoskeletal:      Cervical back: Normal and normal range of motion. No spinous process tenderness or muscular tenderness.      Thoracic back: Normal.      Lumbar back: Tenderness present. No deformity. Normal range of motion. Positive left straight leg raise test. Negative right straight leg raise test.        Back:    Skin:     General: Skin is warm and dry.      Findings: No abrasion or bruising.   Neurological:      General: No focal deficit present.      Mental Status: She is alert.      GCS: GCS eye subscore is 4. GCS verbal subscore is 5. GCS motor subscore is 6.      Sensory: Sensation is intact. No sensory deficit.      Motor: No weakness.      Deep Tendon Reflexes:      Reflex Scores:       Patellar reflexes are 0 on the right side and 0 on the left side.       Achilles reflexes are 0 on the right side and 0 on the left side.     Comments: Unable to elicit BLE reflexes   Psychiatric:         Attention and Perception: She is attentive.         Speech: Speech normal.         Behavior: Behavior normal.         Thought Content: Thought content normal.        Assessment:      1. Encounter related to worker's compensation claim    2. Back pain with left-sided radiculopathy      Plan:     I will notify [facility] to help expedite authorization for PT, MRI and ortho referral.  Patient encouraged to continue home stretches, warm soaks and increase activity as tolerated.  Patient will remain on restricted duty at work.  I will see patient back in 2 weeks or sooner as needed.  Patient verbalized understanding.        Medications Ordered This Encounter   Medications    celecoxib (CELEBREX) 200 MG capsule     Sig: Take 1 capsule (200 mg total) by mouth 2 (two) times daily.     Dispense:  30 capsule     Refill:  1    methocarbamoL (ROBAXIN) 500 MG Tab     Sig: Take 2 tablets (1,000 mg total) by mouth nightly as needed (muscle spasms).      Dispense:  30 tablet     Refill:  0     Patient Instructions: Attention not to aggravate affected area, Daily home exercises/warm soaks, PT to be scheduled once authorized, MRI to be scheduled once authorized, Referral to specialist to be scheduled, once authorized   Restrictions:  (See CA-17)  Follow up in about 2 weeks (around 10/10/2023).

## 2023-09-29 ENCOUNTER — TELEPHONE (OUTPATIENT)
Dept: PRIMARY CARE CLINIC | Facility: CLINIC | Age: 58
End: 2023-09-29
Payer: COMMERCIAL

## 2023-09-29 NOTE — TELEPHONE ENCOUNTER
----- Message from Zhane Graf sent at 9/29/2023  3:46 PM CDT -----  Contact: pt [phone]  2nd Request    Requesting that the Vit D compound needs to be sent to a local pharmacy in [place].    Please call and advise.    Thank You

## 2023-10-02 ENCOUNTER — OFFICE VISIT (OUTPATIENT)
Dept: INTERNAL MEDICINE | Facility: CLINIC | Age: 58
End: 2023-10-02
Payer: COMMERCIAL

## 2023-10-02 ENCOUNTER — TELEPHONE (OUTPATIENT)
Dept: PRIMARY CARE CLINIC | Facility: CLINIC | Age: 58
End: 2023-10-02
Payer: COMMERCIAL

## 2023-10-02 VITALS
BODY MASS INDEX: 27.62 KG/M2 | SYSTOLIC BLOOD PRESSURE: 108 MMHG | HEART RATE: 72 BPM | DIASTOLIC BLOOD PRESSURE: 76 MMHG | OXYGEN SATURATION: 98 % | TEMPERATURE: 99 F | WEIGHT: 155.88 LBS | HEIGHT: 63 IN | RESPIRATION RATE: 16 BRPM

## 2023-10-02 DIAGNOSIS — H61.21 HEARING LOSS OF RIGHT EAR DUE TO CERUMEN IMPACTION: ICD-10-CM

## 2023-10-02 DIAGNOSIS — J06.9 UPPER RESPIRATORY TRACT INFECTION, UNSPECIFIED TYPE: Primary | ICD-10-CM

## 2023-10-02 LAB
CTP QC/QA: YES
SARS-COV-2 RDRP RESP QL NAA+PROBE: NEGATIVE

## 2023-10-02 PROCEDURE — 3008F BODY MASS INDEX DOCD: CPT | Mod: CPTII,S$GLB,, | Performed by: STUDENT IN AN ORGANIZED HEALTH CARE EDUCATION/TRAINING PROGRAM

## 2023-10-02 PROCEDURE — 3044F HG A1C LEVEL LT 7.0%: CPT | Mod: CPTII,S$GLB,, | Performed by: STUDENT IN AN ORGANIZED HEALTH CARE EDUCATION/TRAINING PROGRAM

## 2023-10-02 PROCEDURE — 3008F PR BODY MASS INDEX (BMI) DOCUMENTED: ICD-10-PCS | Mod: CPTII,S$GLB,, | Performed by: STUDENT IN AN ORGANIZED HEALTH CARE EDUCATION/TRAINING PROGRAM

## 2023-10-02 PROCEDURE — 3078F DIAST BP <80 MM HG: CPT | Mod: CPTII,S$GLB,, | Performed by: STUDENT IN AN ORGANIZED HEALTH CARE EDUCATION/TRAINING PROGRAM

## 2023-10-02 PROCEDURE — 99999 PR PBB SHADOW E&M-EST. PATIENT-LVL IV: ICD-10-PCS | Mod: PBBFAC,,, | Performed by: STUDENT IN AN ORGANIZED HEALTH CARE EDUCATION/TRAINING PROGRAM

## 2023-10-02 PROCEDURE — 87635: ICD-10-PCS | Mod: QW,S$GLB,, | Performed by: STUDENT IN AN ORGANIZED HEALTH CARE EDUCATION/TRAINING PROGRAM

## 2023-10-02 PROCEDURE — 3074F SYST BP LT 130 MM HG: CPT | Mod: CPTII,S$GLB,, | Performed by: STUDENT IN AN ORGANIZED HEALTH CARE EDUCATION/TRAINING PROGRAM

## 2023-10-02 PROCEDURE — 3074F PR MOST RECENT SYSTOLIC BLOOD PRESSURE < 130 MM HG: ICD-10-PCS | Mod: CPTII,S$GLB,, | Performed by: STUDENT IN AN ORGANIZED HEALTH CARE EDUCATION/TRAINING PROGRAM

## 2023-10-02 PROCEDURE — 87635 SARS-COV-2 COVID-19 AMP PRB: CPT | Mod: QW,S$GLB,, | Performed by: STUDENT IN AN ORGANIZED HEALTH CARE EDUCATION/TRAINING PROGRAM

## 2023-10-02 PROCEDURE — 3078F PR MOST RECENT DIASTOLIC BLOOD PRESSURE < 80 MM HG: ICD-10-PCS | Mod: CPTII,S$GLB,, | Performed by: STUDENT IN AN ORGANIZED HEALTH CARE EDUCATION/TRAINING PROGRAM

## 2023-10-02 PROCEDURE — 99999 PR PBB SHADOW E&M-EST. PATIENT-LVL IV: CPT | Mod: PBBFAC,,, | Performed by: STUDENT IN AN ORGANIZED HEALTH CARE EDUCATION/TRAINING PROGRAM

## 2023-10-02 PROCEDURE — 99213 PR OFFICE/OUTPT VISIT, EST, LEVL III, 20-29 MIN: ICD-10-PCS | Mod: S$GLB,,, | Performed by: STUDENT IN AN ORGANIZED HEALTH CARE EDUCATION/TRAINING PROGRAM

## 2023-10-02 PROCEDURE — 99213 OFFICE O/P EST LOW 20 MIN: CPT | Mod: S$GLB,,, | Performed by: STUDENT IN AN ORGANIZED HEALTH CARE EDUCATION/TRAINING PROGRAM

## 2023-10-02 PROCEDURE — 3044F PR MOST RECENT HEMOGLOBIN A1C LEVEL <7.0%: ICD-10-PCS | Mod: CPTII,S$GLB,, | Performed by: STUDENT IN AN ORGANIZED HEALTH CARE EDUCATION/TRAINING PROGRAM

## 2023-10-02 PROCEDURE — 1159F PR MEDICATION LIST DOCUMENTED IN MEDICAL RECORD: ICD-10-PCS | Mod: CPTII,S$GLB,, | Performed by: STUDENT IN AN ORGANIZED HEALTH CARE EDUCATION/TRAINING PROGRAM

## 2023-10-02 PROCEDURE — 1159F MED LIST DOCD IN RCRD: CPT | Mod: CPTII,S$GLB,, | Performed by: STUDENT IN AN ORGANIZED HEALTH CARE EDUCATION/TRAINING PROGRAM

## 2023-10-02 RX ORDER — FLUTICASONE PROPIONATE 50 MCG
1 SPRAY, SUSPENSION (ML) NASAL 2 TIMES DAILY PRN
Qty: 16 G | Refills: 0 | Status: SHIPPED | OUTPATIENT
Start: 2023-10-02 | End: 2023-10-03

## 2023-10-02 NOTE — LETTER
October 2, 2023    Mary Alice Garcia  [address] [facility] [address]             [address] [facility] [address]  [address]  [place] [address]  Phone: [phone]  Fax: [phone] To whomever it may concern,     Ms. Mary Alice Garcia saw me in clinic today for an acute illness. Please excuse patient from work on 10/2/2023.       If you have any questions or concerns, please don't hesitate to call.    Sincerely,        Sulema Sparks MD

## 2023-10-02 NOTE — LETTER
October 2, 2023    Mary Alice Garcia  [address] [facility] [address]             [address] [facility] [address]  [address]  [facility] [address]  Phone: [phone]  Fax: [phone] To whomever it may concern,     Ms. Mary Alice Garcia saw me in clinic today for an acute illness. Please excuse her from work until 10/4/2023.       If you have any questions or concerns, please don't hesitate to call.    Sincerely,        Sulema Sparks MD

## 2023-10-02 NOTE — TELEPHONE ENCOUNTER
Back problems and insurance company stated she needs to go see a chiropractor  for it. Can we please put a referral in for patient somewhere.

## 2023-10-02 NOTE — ASSESSMENT & PLAN NOTE
covid test negative. Lungs sound clear. No fever. Do not suspect pneumonia but pt does have hx of TB as a child and had pneumonias in the past. Asked pt to reach out if cough worsens or she develops a fever. Will prescribe nasal spray for the congestion and runny nose.

## 2023-10-02 NOTE — PROGRESS NOTES
Patient in too see Dr Sparks today with a complaint of something falling out of her ear. Per order of Dr Sparks ear drops were put in patients ears for 10 minutes. Then warm water was irrigated in ear thru lavage with noted brown chunks of ear wax fell out of ear. Patient stated she could her better and her ear felt better. Denies any discomfort thru the process and after.

## 2023-10-02 NOTE — PROGRESS NOTES
Subjective     Patient ID: Mary Alice Garcia is a 58 y.o. female.    Chief Complaint: Sore Throat, Ear Fullness, Fatigue, Headache, and Nasal Congestion    Sore Throat   Associated symptoms include congestion, ear pain, headaches, a plugged ear sensation and shortness of breath. Pertinent negatives include no abdominal pain, coughing, diarrhea or vomiting.   Ear Fullness   Associated symptoms include headaches, rhinorrhea and a sore throat. Pertinent negatives include no abdominal pain, coughing, diarrhea or vomiting.   Fatigue  Associated symptoms include chills, congestion, fatigue, headaches and a sore throat. Pertinent negatives include no abdominal pain, chest pain, coughing, fever, nausea or vomiting.   Headache   Associated symptoms include ear pain, rhinorrhea and a sore throat. Pertinent negatives include no abdominal pain, coughing, dizziness, fever, nausea or vomiting.     Patient is a 57 yo female here for sore throat, right ear pain with decreased in hearing, fatigue, HA, nasal congestion, chills, runny nose, myalgias since Saturday. No fever. She is around people daily. Did not try any medications other than tylenol. Did not test for covid at home. Also has a fever blister on her lip which developed this morning. Had one loose stool this am.   Review of Systems   Constitutional:  Positive for chills and fatigue. Negative for fever.   HENT:  Positive for nasal congestion, ear pain, rhinorrhea and sore throat. Negative for postnasal drip.    Respiratory:  Positive for shortness of breath. Negative for cough and chest tightness.    Cardiovascular:  Negative for chest pain.   Gastrointestinal:  Negative for abdominal pain, blood in stool, constipation, diarrhea, nausea and vomiting.   Genitourinary:  Negative for dysuria and hematuria.   Neurological:  Positive for headaches. Negative for dizziness and light-headedness.          Objective     Physical Exam  Vitals and nursing note reviewed.   Constitutional:        Appearance: Normal appearance.   HENT:      Right Ear: There is impacted cerumen.      Left Ear: Tympanic membrane and ear canal normal. There is impacted cerumen.      Mouth/Throat:      Mouth: Mucous membranes are moist.      Pharynx: Oropharynx is clear. No oropharyngeal exudate or posterior oropharyngeal erythema.   Eyes:      Conjunctiva/sclera: Conjunctivae normal.   Cardiovascular:      Rate and Rhythm: Normal rate and regular rhythm.      Heart sounds: Normal heart sounds.   Pulmonary:      Effort: Pulmonary effort is normal.      Breath sounds: Normal breath sounds.   Abdominal:      General: Bowel sounds are normal.   Musculoskeletal:      Right lower leg: No edema.      Left lower leg: No edema.   Lymphadenopathy:      Cervical: No cervical adenopathy.   Skin:     General: Skin is warm.   Neurological:      Mental Status: She is alert and oriented to person, place, and time.   Psychiatric:         Mood and Affect: Mood normal.         Behavior: Behavior normal.            Assessment and Plan     1. Upper respiratory tract infection, unspecified type  Assessment & Plan:  covid test negative. Lungs sound clear. No fever. Do not suspect pneumonia but pt does have hx of TB as a child and had pneumonias in the past. Asked pt to reach out if cough worsens or she develops a fever. Will prescribe nasal spray for the congestion and runny nose.     Orders:  -     POCT COVID-19 Rapid Screening    2. Hearing loss of right ear due to cerumen impaction  Assessment & Plan:  Right ear cerumen impaction noted completely obstructing the TM. Will get it flushed today    Orders:  -     Ear wax removal    Other orders  -     fluticasone propionate (FLONASE) 50 mcg/actuation nasal spray; 1 spray (50 mcg total) by Each Nostril route 2 (two) times daily as needed for Rhinitis.  Dispense: 16 g; Refill: 0

## 2023-10-02 NOTE — TELEPHONE ENCOUNTER
----- Message from Tamar Olivier MA sent at 10/2/2023 10:55 AM CDT -----  Contact: [phone]    ----- Message -----  From: Josse Grewal  Sent: 10/2/2023  10:49 AM CDT  To: Bridger Leone Staff    [email or web address]     Patient is calling for Medical Advice regarding: questions     How long has patient had these symptoms:    Pharmacy name and phone#:    Would like response via [employer]:  call back     Comments:    Pt would like a call back

## 2023-10-03 ENCOUNTER — TELEPHONE (OUTPATIENT)
Dept: URGENT CARE | Facility: CLINIC | Age: 58
End: 2023-10-03
Payer: COMMERCIAL

## 2023-10-03 RX ORDER — FLUTICASONE PROPIONATE 50 MCG
SPRAY, SUSPENSION (ML) NASAL
Qty: 48 G | Refills: 0 | Status: SHIPPED | OUTPATIENT
Start: 2023-10-03 | End: 2024-02-26

## 2023-10-03 NOTE — TELEPHONE ENCOUNTER
Left message for patient to return my call regarding medication.  Patient was unable to get Celebrex filled.

## 2023-10-05 ENCOUNTER — TELEPHONE (OUTPATIENT)
Dept: URGENT CARE | Facility: CLINIC | Age: 58
End: 2023-10-05
Payer: COMMERCIAL

## 2023-10-05 NOTE — TELEPHONE ENCOUNTER
Pt called about her referrals.  I let her know that this claim is under Short Form Closure with the Department of Labor and that she does not have an accepted diagnosis at this time.  She will need to speak with her  regarding her claim.

## 2023-10-06 ENCOUNTER — PATIENT MESSAGE (OUTPATIENT)
Dept: GASTROENTEROLOGY | Facility: CLINIC | Age: 58
End: 2023-10-06
Payer: COMMERCIAL

## 2023-10-06 ENCOUNTER — TELEPHONE (OUTPATIENT)
Dept: URGENT CARE | Facility: CLINIC | Age: 58
End: 2023-10-06
Payer: COMMERCIAL

## 2023-10-09 ENCOUNTER — OFFICE VISIT (OUTPATIENT)
Dept: URGENT CARE | Facility: CLINIC | Age: 58
End: 2023-10-09
Payer: OTHER GOVERNMENT

## 2023-10-09 ENCOUNTER — TELEPHONE (OUTPATIENT)
Dept: INTERNAL MEDICINE | Facility: CLINIC | Age: 58
End: 2023-10-09
Payer: COMMERCIAL

## 2023-10-09 ENCOUNTER — PATIENT MESSAGE (OUTPATIENT)
Dept: OBSTETRICS AND GYNECOLOGY | Facility: CLINIC | Age: 58
End: 2023-10-09
Payer: COMMERCIAL

## 2023-10-09 ENCOUNTER — PATIENT MESSAGE (OUTPATIENT)
Dept: PRIMARY CARE CLINIC | Facility: CLINIC | Age: 58
End: 2023-10-09
Payer: COMMERCIAL

## 2023-10-09 VITALS
HEART RATE: 70 BPM | TEMPERATURE: 98 F | WEIGHT: 154.31 LBS | BODY MASS INDEX: 27.34 KG/M2 | DIASTOLIC BLOOD PRESSURE: 90 MMHG | HEIGHT: 63 IN | SYSTOLIC BLOOD PRESSURE: 132 MMHG | OXYGEN SATURATION: 99 %
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
## 2023-10-09 DIAGNOSIS — Z02.6 ENCOUNTER RELATED TO WORKER'S COMPENSATION CLAIM: ICD-10-CM

## 2023-10-09 DIAGNOSIS — M54.10 BACK PAIN WITH LEFT-SIDED RADICULOPATHY: Primary | ICD-10-CM

## 2023-10-09 PROCEDURE — 99213 OFFICE O/P EST LOW 20 MIN: CPT | Mod: S$GLB,,, | Performed by: PHYSICIAN ASSISTANT

## 2023-10-09 PROCEDURE — 99213 PR OFFICE/OUTPT VISIT, EST, LEVL III, 20-29 MIN: ICD-10-PCS | Mod: S$GLB,,, | Performed by: PHYSICIAN ASSISTANT

## 2023-10-09 RX ORDER — METHYLPREDNISOLONE 4 MG/1
TABLET ORAL
Qty: 1 EACH | Refills: 0 | Status: SHIPPED | OUTPATIENT
Start: 2023-10-09 | End: 2024-02-26

## 2023-10-09 RX ORDER — METHOCARBAMOL 500 MG/1
1000 TABLET, FILM COATED ORAL NIGHTLY PRN
Qty: 30 TABLET | Refills: 0 | Status: SHIPPED | OUTPATIENT
Start: 2023-10-09 | End: 2023-10-25

## 2023-10-09 NOTE — TELEPHONE ENCOUNTER
----- Message from Caesar Eaton sent at 10/9/2023  9:56 AM CDT -----  Contact: [phone] @ patient  Good morning patient would like a call back to discuss some paper work that she needs filled out by the doc from her last visit. Please give patient a call back [phone]

## 2023-10-09 NOTE — PROGRESS NOTES
Subjective:      Patient ID: Mary Alice Garcia is a 58 y.o. female.    Chief Complaint: Back Pain (BACK)    KW  Patient's place of employment - [employer]  Patient's job title -   Date of Injury - 08/19/2023  Body part injured - BACK  Current work status per last visit - LIGHT  Improved, same, or worse - SAME  Pain Scale right now (1-10) -  6    Patient presents for follow-up low back pain with left-sided sciatica.  She reports no significant improvement since last office visit.  She reports shooting pain from the back to the left foot with numbness and tingling in the toes.  She denies any bowel or bladder incontinence or saddle anesthesia.  Reports severe flare-up of back pain that occurred Friday.  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  Reports the next day she could barely get out of bed due to low back pain.  Says she called in sick Saturday and stayed home.  Says she took Robaxin with some improvement.  She is here with [facility] paperwork to be completed.    Back Pain  Associated symptoms include numbness. Pertinent negatives include no bladder incontinence or bowel incontinence.     Constitution: Positive for activity change.   Gastrointestinal:  Negative for bowel incontinence.   Genitourinary:  Negative for bladder incontinence.   Musculoskeletal:  Positive for pain and back pain.   Neurological:  Positive for numbness and tingling.     Objective:     Physical Exam  Vitals and nursing note reviewed.   Constitutional:       General: She is not in acute distress.     Appearance: Normal appearance. She is well-developed.   HENT:      Head: Normocephalic and atraumatic.      Right Ear: Hearing and external ear normal.      Left Ear: Hearing and external ear normal.      Nose: Nose normal. No nasal deformity.   Eyes:      General: Lids are normal.      Conjunctiva/sclera: Conjunctivae normal.      Right eye: Right conjunctiva is not injected.      Left eye: Left conjunctiva is not injected.   Neck:       Trachea: Trachea normal.   Cardiovascular:      Pulses: Normal pulses.           Dorsalis pedis pulses are 2+ on the right side and 2+ on the left side.        Posterior tibial pulses are 2+ on the right side and 2+ on the left side.   Pulmonary:      Effort: Pulmonary effort is normal. No respiratory distress.      Breath sounds: No stridor.   Musculoskeletal:      Cervical back: Normal and normal range of motion. No spinous process tenderness or muscular tenderness.      Thoracic back: Normal.      Lumbar back: Tenderness present. No deformity. Normal range of motion. Positive left straight leg raise test. Negative right straight leg raise test.        Back:    Skin:     General: Skin is warm and dry.      Findings: No abrasion or bruising.   Neurological:      General: No focal deficit present.      Mental Status: She is alert.      GCS: GCS eye subscore is 4. GCS verbal subscore is 5. GCS motor subscore is 6.      Sensory: Sensation is intact. No sensory deficit.      Motor: No weakness.      Deep Tendon Reflexes:      Reflex Scores:       Patellar reflexes are 0 on the right side and 0 on the left side.       Achilles reflexes are 0 on the right side and 0 on the left side.     Comments: Unable to elicit BLE reflexes   Psychiatric:         Attention and Perception: She is attentive.         Speech: Speech normal.         Behavior: Behavior normal.         Thought Content: Thought content normal.      Assessment:      1. Back pain with left-sided radiculopathy    2. Encounter related to worker's compensation claim    3. Work related injury      Plan:     MRI, PT and Ortho spine referrals pending authorization. I will again notify [facility] to attempt to get referrals authorized. I completed [facility] paperwork at patient's request.   Encouraged home exercises, warm soaks. I will repeat medrol pack. Continue robaxin qhs prn.   It is with a high degree of medical certainty that this patient's current signs and symptoms  were caused or exacerbated by the work related injury mentioned in the note above.      Medications Ordered This Encounter   Medications    methocarbamoL (ROBAXIN) 500 MG Tab     Sig: Take 2 tablets (1,000 mg total) by mouth nightly as needed (muscle spasms).     Dispense:  30 tablet     Refill:  0    methylPREDNISolone (MEDROL DOSEPACK) 4 mg tablet     Sig: use as directed     Dispense:  1 each     Refill:  0     Patient Instructions: Attention not to aggravate affected area, Daily home exercises/warm soaks, PT to be scheduled once authorized, MRI to be scheduled once authorized, Referral to specialist to be scheduled, once authorized   Restrictions:  (See CA-17)  Follow up in about 2 weeks (around 10/23/2023).

## 2023-10-09 NOTE — LETTER
Urgent Care - [address] [address]  Phone: [phone]  Fax: [phone]  [facility]: [id number]    Pt Name: Mary Alice Garcia  Injury Date: 08/19/2023   Employee ID: [id number] Date of  Treatment: 10/09/2023   Company: [employer]      Appointment Time: 08:45 AM Arrived: 8:40 AM   Provider: Garrick Hernandez PA-C Time Out:9:37 AM     Office Treatment:   1. Back pain with left-sided radiculopathy    2. Encounter related to worker's compensation claim    3. Work related injury      Medications Ordered This Encounter   Medications    methocarbamoL (ROBAXIN) 500 MG Tab    methylPREDNISolone (MEDROL DOSEPACK) 4 mg tablet      Patient Instructions: Attention not to aggravate affected area, Daily home exercises/warm soaks, PT to be scheduled once authorized, MRI to be scheduled once authorized, Referral to specialist to be scheduled, once authorized    Restrictions:  (See CA-17)     Return Appointment: 10/23/2023 at 9:00 AM      RUI

## 2023-10-17 ENCOUNTER — LAB VISIT (OUTPATIENT)
Dept: LAB | Facility: HOSPITAL | Age: 58
End: 2023-10-17
Attending: NURSE PRACTITIONER
Payer: COMMERCIAL

## 2023-10-17 ENCOUNTER — OFFICE VISIT (OUTPATIENT)
Dept: PRIMARY CARE CLINIC | Facility: CLINIC | Age: 58
End: 2023-10-17
Payer: COMMERCIAL

## 2023-10-17 VITALS
WEIGHT: 157.63 LBS | BODY MASS INDEX: 27.92 KG/M2 | RESPIRATION RATE: 16 BRPM | SYSTOLIC BLOOD PRESSURE: 110 MMHG | DIASTOLIC BLOOD PRESSURE: 70 MMHG | OXYGEN SATURATION: 99 % | HEART RATE: 66 BPM

## 2023-10-17 DIAGNOSIS — R00.2 PALPITATIONS: ICD-10-CM

## 2023-10-17 DIAGNOSIS — E03.9 ACQUIRED HYPOTHYROIDISM: ICD-10-CM

## 2023-10-17 DIAGNOSIS — G43.809 OTHER MIGRAINE WITHOUT STATUS MIGRAINOSUS, NOT INTRACTABLE: ICD-10-CM

## 2023-10-17 DIAGNOSIS — J01.10 ACUTE NON-RECURRENT FRONTAL SINUSITIS: Primary | ICD-10-CM

## 2023-10-17 DIAGNOSIS — G89.29 CHRONIC PAIN OF RIGHT KNEE: ICD-10-CM

## 2023-10-17 DIAGNOSIS — R07.9 CHEST PAIN OF UNCERTAIN ETIOLOGY: ICD-10-CM

## 2023-10-17 DIAGNOSIS — M25.561 CHRONIC PAIN OF RIGHT KNEE: ICD-10-CM

## 2023-10-17 DIAGNOSIS — I10 ESSENTIAL HYPERTENSION: ICD-10-CM

## 2023-10-17 DIAGNOSIS — J01.10 ACUTE NON-RECURRENT FRONTAL SINUSITIS: ICD-10-CM

## 2023-10-17 LAB
T3 SERPL-MCNC: 109 NG/DL (ref 60–180)
T4 FREE SERPL-MCNC: 1.17 NG/DL (ref 0.71–1.51)
THYROPEROXIDASE IGG SERPL-ACNC: <6 IU/ML
TSH SERPL DL<=0.005 MIU/L-ACNC: 1.01 UIU/ML (ref 0.4–4)

## 2023-10-17 PROCEDURE — 1159F PR MEDICATION LIST DOCUMENTED IN MEDICAL RECORD: ICD-10-PCS | Mod: CPTII,S$GLB,, | Performed by: NURSE PRACTITIONER

## 2023-10-17 PROCEDURE — 3044F HG A1C LEVEL LT 7.0%: CPT | Mod: CPTII,S$GLB,, | Performed by: NURSE PRACTITIONER

## 2023-10-17 PROCEDURE — 3074F PR MOST RECENT SYSTOLIC BLOOD PRESSURE < 130 MM HG: ICD-10-PCS | Mod: CPTII,S$GLB,, | Performed by: NURSE PRACTITIONER

## 2023-10-17 PROCEDURE — 3074F SYST BP LT 130 MM HG: CPT | Mod: CPTII,S$GLB,, | Performed by: NURSE PRACTITIONER

## 2023-10-17 PROCEDURE — 84480 ASSAY TRIIODOTHYRONINE (T3): CPT | Performed by: NURSE PRACTITIONER

## 2023-10-17 PROCEDURE — 1159F MED LIST DOCD IN RCRD: CPT | Mod: CPTII,S$GLB,, | Performed by: NURSE PRACTITIONER

## 2023-10-17 PROCEDURE — 3078F DIAST BP <80 MM HG: CPT | Mod: CPTII,S$GLB,, | Performed by: NURSE PRACTITIONER

## 2023-10-17 PROCEDURE — 1160F PR REVIEW ALL MEDS BY PRESCRIBER/CLIN PHARMACIST DOCUMENTED: ICD-10-PCS | Mod: CPTII,S$GLB,, | Performed by: NURSE PRACTITIONER

## 2023-10-17 PROCEDURE — 84443 ASSAY THYROID STIM HORMONE: CPT | Performed by: NURSE PRACTITIONER

## 2023-10-17 PROCEDURE — 1160F RVW MEDS BY RX/DR IN RCRD: CPT | Mod: CPTII,S$GLB,, | Performed by: NURSE PRACTITIONER

## 2023-10-17 PROCEDURE — 36415 COLL VENOUS BLD VENIPUNCTURE: CPT | Performed by: NURSE PRACTITIONER

## 2023-10-17 PROCEDURE — 99999 PR PBB SHADOW E&M-EST. PATIENT-LVL IV: ICD-10-PCS | Mod: PBBFAC,,, | Performed by: NURSE PRACTITIONER

## 2023-10-17 PROCEDURE — 99999 PR PBB SHADOW E&M-EST. PATIENT-LVL IV: CPT | Mod: PBBFAC,,, | Performed by: NURSE PRACTITIONER

## 2023-10-17 PROCEDURE — 3078F PR MOST RECENT DIASTOLIC BLOOD PRESSURE < 80 MM HG: ICD-10-PCS | Mod: CPTII,S$GLB,, | Performed by: NURSE PRACTITIONER

## 2023-10-17 PROCEDURE — 84439 ASSAY OF FREE THYROXINE: CPT | Performed by: NURSE PRACTITIONER

## 2023-10-17 PROCEDURE — 3008F BODY MASS INDEX DOCD: CPT | Mod: CPTII,S$GLB,, | Performed by: NURSE PRACTITIONER

## 2023-10-17 PROCEDURE — 99214 OFFICE O/P EST MOD 30 MIN: CPT | Mod: S$GLB,,, | Performed by: NURSE PRACTITIONER

## 2023-10-17 PROCEDURE — 3044F PR MOST RECENT HEMOGLOBIN A1C LEVEL <7.0%: ICD-10-PCS | Mod: CPTII,S$GLB,, | Performed by: NURSE PRACTITIONER

## 2023-10-17 PROCEDURE — 3008F PR BODY MASS INDEX (BMI) DOCUMENTED: ICD-10-PCS | Mod: CPTII,S$GLB,, | Performed by: NURSE PRACTITIONER

## 2023-10-17 PROCEDURE — 86376 MICROSOMAL ANTIBODY EACH: CPT | Performed by: NURSE PRACTITIONER

## 2023-10-17 PROCEDURE — 99214 PR OFFICE/OUTPT VISIT, EST, LEVL IV, 30-39 MIN: ICD-10-PCS | Mod: S$GLB,,, | Performed by: NURSE PRACTITIONER

## 2023-10-17 RX ORDER — BUTALBITAL, ACETAMINOPHEN AND CAFFEINE 50; 325; 40 MG/1; MG/1; MG/1
1 TABLET ORAL EVERY 4 HOURS PRN
Qty: 30 TABLET | Refills: 0 | Status: SHIPPED | OUTPATIENT
Start: 2023-10-17 | End: 2024-02-26

## 2023-10-17 RX ORDER — LEVOTHYROXINE SODIUM 137 UG/1
137 TABLET ORAL
Qty: 90 TABLET | Refills: 3 | Status: SHIPPED | OUTPATIENT
Start: 2023-10-17 | End: 2024-02-26

## 2023-10-17 RX ORDER — PROPRANOLOL HYDROCHLORIDE 60 MG/1
60 TABLET ORAL 2 TIMES DAILY
Qty: 180 TABLET | Refills: 3 | Status: SHIPPED | OUTPATIENT
Start: 2023-10-17

## 2023-10-17 RX ORDER — FUROSEMIDE 20 MG/1
20 TABLET ORAL DAILY
Qty: 3 TABLET | Refills: 0 | Status: SHIPPED | OUTPATIENT
Start: 2023-10-17 | End: 2024-02-26

## 2023-10-17 RX ORDER — AMOXICILLIN AND CLAVULANATE POTASSIUM 875; 125 MG/1; MG/1
1 TABLET, FILM COATED ORAL EVERY 12 HOURS
Qty: 14 TABLET | Refills: 0 | Status: SHIPPED | OUTPATIENT
Start: 2023-10-17 | End: 2023-10-24

## 2023-10-17 RX ORDER — AZELASTINE 1 MG/ML
1 SPRAY, METERED NASAL 2 TIMES DAILY
Qty: 30 ML | Refills: 0 | Status: SHIPPED | OUTPATIENT
Start: 2023-10-17 | End: 2024-02-26

## 2023-10-17 NOTE — PROGRESS NOTES
[facility] Note    Chief Complaint      Chief Complaint   Patient presents with    Migraine    Sore Throat     History of Present Illness      Mary Alice Garcia is a 58 y.o. female patient who presents today for complaints of head congestion, sinus pressure, sore throat and migraines.    Health Maintenance   Topic Date Due    TETANUS VACCINE  2024 (Originally 1985)    Shingles Vaccine (1 of 2) 2024 (Originally 2017)    Mammogram  2024    Lipid Panel  2028    Colorectal Cancer Screening  10/25/2033    Hepatitis C Screening  Completed       Past Medical History:   Diagnosis Date    Arthritis     Depression     Hyperlipidemia     Hypertension     2013    Syncope and collapse     Thyroid disease     Tuberculosis        Past Surgical History:   Procedure Laterality Date    BUNIONECTOMY Left      SECTION      COLONOSCOPY N/A 10/25/2023    Procedure: COLONOSCOPY;  Surgeon: Gadiel De MD;  Location: [facility];  Service: Endoscopy;  Laterality: N/A;    ESOPHAGOGASTRODUODENOSCOPY  2020    ESOPHAGOGASTRODUODENOSCOPY N/A 2020    Procedure: EGD (ESOPHAGOGASTRODUODENOSCOPY);  Surgeon: Rio Galeano MD;  Location: [facility];  Service: Endoscopy;  Laterality: N/A;    ESOPHAGOGASTRODUODENOSCOPY N/A 10/25/2023    Procedure: EGD (ESOPHAGOGASTRODUODENOSCOPY);  Surgeon: Gadiel De MD;  Location: [facility];  Service: Endoscopy;  Laterality: N/A;    HEMORRHOID SURGERY      KNEE SURGERY      right knee    thyroid removed      TONSILLECTOMY      TUBAL LIGATION         family history includes Breast cancer in her mother and sister; Cancer in her maternal aunt and mother; Diabetes in her maternal grandmother and paternal grandmother; Drug abuse in her maternal aunt; Hyperlipidemia in her father; Hypertension in her brother and mother.    Social History     Tobacco Use    Smoking status: Never     Passive exposure: Never    Smokeless tobacco:  Never   Substance Use Topics    Alcohol use: Yes     Alcohol/week: 8.0 standard drinks of alcohol     Types: 3 Glasses of wine, 5 Cans of beer per week    Drug use: No       Review of Systems   Constitutional:  Negative for chills and fever.   HENT:  Positive for congestion, sinus pain and sore throat.    Respiratory:  Negative for cough and shortness of breath.    Cardiovascular:  Negative for chest pain.   Gastrointestinal:  Negative for nausea.   Genitourinary:  Negative for dysuria.   Neurological:  Negative for dizziness and headaches.        Outpatient Encounter Medications as of 10/17/2023   Medication Sig Dispense Refill    calcium carbonate (OS-FLO) 500 mg calcium (1,250 mg) tablet Take 2 tablets by mouth 2 (two) times daily.        celecoxib (CELEBREX) 200 MG capsule Take 1 capsule (200 mg total) by mouth 2 (two) times daily. 30 capsule 1    cholecalciferol, vitamin D3, 125 mcg (5,000 unit) capsule Take 5,000 Units by mouth once daily.      fish oil-omega-3 fatty acids 300-1,000 mg capsule Take 2 g by mouth once daily.      fluticasone propionate (FLONASE) 50 mcg/actuation nasal spray SHAKE LIQUID AND USE 1 SPRAY(50 MCG) IN EACH NOSTRIL TWICE DAILY AS NEEDED FOR RHINITIS 48 g 0    [] methocarbamoL (ROBAXIN) 500 MG Tab Take 2 tablets (1,000 mg total) by mouth nightly as needed (muscle spasms). 30 tablet 0    norethindrone ac-eth estradioL (FEMHRT LOW DOSE) 0.5-2.5 mg-mcg per tablet Take 1 tablet by mouth once daily. 90 tablet 3    pantoprazole (PROTONIX) 40 MG tablet Take 1 tablet (40 mg total) by mouth 2 (two) times daily. 60 tablet 11    sucralfate (CARAFATE) 1 gram tablet Take 1 tablet (1 g total) by mouth 3 (three) times daily. 90 tablet 2    [DISCONTINUED] levothyroxine (SYNTHROID) 137 MCG Tab tablet Take 1 tablet (137 mcg total) by mouth before breakfast. 90 tablet 3    [DISCONTINUED] propranoloL (INDERAL) 60 MG tablet Take 1 tablet (60 mg total) by mouth 2 (two) times daily. 180 tablet 3     [] amoxicillin-clavulanate 875-125mg (AUGMENTIN) 875-125 mg per tablet Take 1 tablet by mouth every 12 (twelve) hours. for 7 days 14 tablet 0    azelastine (ASTELIN) 137 mcg (0.1 %) nasal spray 1 spray (137 mcg total) by Nasal route 2 (two) times daily. 30 mL 0    butalbital-acetaminophen-caffeine -40 mg (FIORICET, ESGIC) -40 mg per tablet Take 1 tablet by mouth every 4 (four) hours as needed for Pain. 30 tablet 0    furosemide (LASIX) 20 MG tablet Take 1 tablet (20 mg total) by mouth once daily. for 3 days 3 tablet 0    levothyroxine (SYNTHROID) 137 MCG Tab tablet Take 1 tablet (137 mcg total) by mouth before breakfast. 90 tablet 3    methylPREDNISolone (MEDROL DOSEPACK) 4 mg tablet use as directed (Patient not taking: Reported on 10/17/2023) 1 each 0    propranoloL (INDERAL) 60 MG tablet Take 1 tablet (60 mg total) by mouth 2 (two) times daily. 180 tablet 3    sodium,potassium,mag sulfates (SUPREP BOWEL PREP KIT) 17.5-3.13-1.6 gram SolR Take 177 mLs by mouth once daily. 1 kit 0     No facility-administered encounter medications on file as of 10/17/2023.        Review of patient's allergies indicates:   Allergen Reactions    Paroxetine hcl Rash and Nausea And Vomiting       Physical Exam      Vital Signs  Pulse: 66  Resp: 16  SpO2: 99 %  BP: 110/70  BP Location: Right arm  Patient Position: Sitting  Height and Weight  Weight: 71.5 kg (157 lb 10.1 oz)    Physical Exam  Vitals and nursing note reviewed.   Constitutional:       General: She is not in acute distress.     Appearance: Normal appearance. She is ill-appearing.   HENT:      Head: Normocephalic and atraumatic.      Ears:      Comments: Redness to bilateral ear canals     Mouth/Throat:      Mouth: Mucous membranes are moist.      Pharynx: Posterior oropharyngeal erythema present.   Cardiovascular:      Rate and Rhythm: Normal rate and regular rhythm.      Heart sounds: Normal heart sounds.   Pulmonary:      Effort: Pulmonary effort is  normal. No respiratory distress.      Breath sounds: Normal breath sounds.   Musculoskeletal:         General: Normal range of motion.   Lymphadenopathy:      Cervical: Cervical adenopathy present.   Skin:     General: Skin is warm and dry.   Neurological:      General: No focal deficit present.      Mental Status: She is alert and oriented to person, place, and time.   Psychiatric:         Mood and Affect: Mood normal.         Behavior: Behavior normal.         Thought Content: Thought content normal.         Judgment: Judgment normal.          Laboratory:  CBC:  Lab Results   Component Value Date    WBC 11.17 09/14/2023    RBC 4.78 09/14/2023    HGB 14.0 09/14/2023    HCT 41.3 09/14/2023     09/14/2023    MCV 86 09/14/2023    MCH 29.3 09/14/2023    MCHC 33.9 09/14/2023    MCHC 32.9 07/18/2023    MCHC 32.9 07/11/2023     CMP:  Lab Results   Component Value Date     07/18/2023    CALCIUM 9.6 07/18/2023    ALBUMIN 3.9 07/18/2023    PROT 7.9 07/18/2023     07/18/2023    K 4.7 07/18/2023    CO2 25 07/18/2023     07/18/2023    BUN 9 07/18/2023    ALKPHOS 76 07/18/2023    ALT 21 07/18/2023    AST 24 07/18/2023    BILITOT 0.4 07/18/2023    BILITOT 0.7 07/11/2023    BILITOT 0.4 08/14/2020     URINALYSIS:  Lab Results   Component Value Date    COLORU Yellow 10/15/2019    SPECGRAV <=1.005 (A) 10/15/2019    PHUR 5.5 08/29/2023    PROTEINUA Negative 10/15/2019    BACTERIA Rare 07/05/2012    NITRITE Negative 10/15/2019    LEUKOCYTESUR Negative 10/15/2019    UROBILINOGEN Negative 10/15/2019      LIPIDS:  Lab Results   Component Value Date    TSH 1.012 10/17/2023    TSH 0.185 (L) 07/11/2023    TSH 12.838 (H) 12/02/2020    HDL 53 07/11/2023    HDL 45 08/14/2020    HDL 47 02/05/2015    CHOL 218 (H) 07/11/2023    CHOL 202 (H) 08/14/2020    CHOL 193 02/05/2015    TRIG 142 07/11/2023    TRIG 107 08/14/2020    TRIG 152 (H) 02/05/2015    LDLCALC 136.6 07/11/2023    LDLCALC 135.6 08/14/2020    LDLCALC 115.6  02/05/2015    CHOLHDL 24.3 07/11/2023    CHOLHDL 22.3 08/14/2020    CHOLHDL 24.4 02/05/2015    NONHDLCHOL 165 07/11/2023    NONHDLCHOL 157 08/14/2020    NONHDLCHOL 146 02/05/2015    TOTALCHOLEST 4.1 07/11/2023    TOTALCHOLEST 4.5 08/14/2020    TOTALCHOLEST 4.1 02/05/2015     TSH:  Lab Results   Component Value Date    TSH 1.012 10/17/2023    TSH 0.185 (L) 07/11/2023    TSH 12.838 (H) 12/02/2020     A1C:  Lab Results   Component Value Date    HGBA1C 5.4 07/11/2023    HGBA1C 5.6 08/14/2020         Assessment/Plan     Mary Alice Garcia is a 58 y.o.female with:    Acute non-recurrent frontal sinusitis  -     furosemide (LASIX) 20 MG tablet; Take 1 tablet (20 mg total) by mouth once daily. for 3 days  Dispense: 3 tablet; Refill: 0  -     amoxicillin-clavulanate 875-125mg (AUGMENTIN) 875-125 mg per tablet; Take 1 tablet by mouth every 12 (twelve) hours. for 7 days  Dispense: 14 tablet; Refill: 0  -     azelastine (ASTELIN) 137 mcg (0.1 %) nasal spray; 1 spray (137 mcg total) by Nasal route 2 (two) times daily.  Dispense: 30 mL; Refill: 0  -     TSH; Future; Expected date: 10/17/2023  -     T4, Free; Future; Expected date: 10/17/2023  -     T3; Future; Expected date: 10/17/2023  -     THYROID PEROXIDASE ANTIBODY; Future; Expected date: 10/17/2023    Essential hypertension  -     Ambulatory referral/consult to Orthopedics; Future; Expected date: 10/17/2023  -     furosemide (LASIX) 20 MG tablet; Take 1 tablet (20 mg total) by mouth once daily. for 3 days  Dispense: 3 tablet; Refill: 0  -     amoxicillin-clavulanate 875-125mg (AUGMENTIN) 875-125 mg per tablet; Take 1 tablet by mouth every 12 (twelve) hours. for 7 days  Dispense: 14 tablet; Refill: 0  -     azelastine (ASTELIN) 137 mcg (0.1 %) nasal spray; 1 spray (137 mcg total) by Nasal route 2 (two) times daily.  Dispense: 30 mL; Refill: 0  -     TSH; Future; Expected date: 10/17/2023  -     T4, Free; Future; Expected date: 10/17/2023  -     T3; Future; Expected date:  10/17/2023  -     THYROID PEROXIDASE ANTIBODY; Future; Expected date: 10/17/2023  -     propranoloL (INDERAL) 60 MG tablet; Take 1 tablet (60 mg total) by mouth 2 (two) times daily.  Dispense: 180 tablet; Refill: 3    Other migraine without status migrainosus, not intractable  -     butalbital-acetaminophen-caffeine -40 mg (FIORICET, ESGIC) -40 mg per tablet; Take 1 tablet by mouth every 4 (four) hours as needed for Pain.  Dispense: 30 tablet; Refill: 0  -     furosemide (LASIX) 20 MG tablet; Take 1 tablet (20 mg total) by mouth once daily. for 3 days  Dispense: 3 tablet; Refill: 0  -     amoxicillin-clavulanate 875-125mg (AUGMENTIN) 875-125 mg per tablet; Take 1 tablet by mouth every 12 (twelve) hours. for 7 days  Dispense: 14 tablet; Refill: 0  -     azelastine (ASTELIN) 137 mcg (0.1 %) nasal spray; 1 spray (137 mcg total) by Nasal route 2 (two) times daily.  Dispense: 30 mL; Refill: 0  -     TSH; Future; Expected date: 10/17/2023  -     T4, Free; Future; Expected date: 10/17/2023  -     T3; Future; Expected date: 10/17/2023  -     THYROID PEROXIDASE ANTIBODY; Future; Expected date: 10/17/2023    Chronic pain of right knee  -     Ambulatory referral/consult to Orthopedics; Future; Expected date: 10/17/2023  -     furosemide (LASIX) 20 MG tablet; Take 1 tablet (20 mg total) by mouth once daily. for 3 days  Dispense: 3 tablet; Refill: 0  -     amoxicillin-clavulanate 875-125mg (AUGMENTIN) 875-125 mg per tablet; Take 1 tablet by mouth every 12 (twelve) hours. for 7 days  Dispense: 14 tablet; Refill: 0  -     azelastine (ASTELIN) 137 mcg (0.1 %) nasal spray; 1 spray (137 mcg total) by Nasal route 2 (two) times daily.  Dispense: 30 mL; Refill: 0  -     TSH; Future; Expected date: 10/17/2023  -     T4, Free; Future; Expected date: 10/17/2023  -     T3; Future; Expected date: 10/17/2023  -     THYROID PEROXIDASE ANTIBODY; Future; Expected date: 10/17/2023    Palpitations  -     TSH; Future; Expected date:  10/17/2023  -     T4, Free; Future; Expected date: 10/17/2023  -     T3; Future; Expected date: 10/17/2023  -     THYROID PEROXIDASE ANTIBODY; Future; Expected date: 10/17/2023    Chest pain of uncertain etiology  -     TSH; Future; Expected date: 10/17/2023  -     T4, Free; Future; Expected date: 10/17/2023  -     T3; Future; Expected date: 10/17/2023  -     THYROID PEROXIDASE ANTIBODY; Future; Expected date: 10/17/2023    Acquired hypothyroidism  -     TSH; Future; Expected date: 10/17/2023  -     T4, Free; Future; Expected date: 10/17/2023  -     T3; Future; Expected date: 10/17/2023  -     THYROID PEROXIDASE ANTIBODY; Future; Expected date: 10/17/2023  -     levothyroxine (SYNTHROID) 137 MCG Tab tablet; Take 1 tablet (137 mcg total) by mouth before breakfast.  Dispense: 90 tablet; Refill: 3        Stay hydrated with water and warm tea and honey  Monitor symptoms  Take meds as prescribed  Health Maintenance Due   Topic Date Due    Influenza Vaccine (1) 09/01/2023        I spent 36 minutes on the day of this encounter for preparing for, evaluating, treating, and managing this patient.      -Continue current medications and maintain follow up with specialists.  Return to clinic as needed for any concerns   No follow-ups on file.       JULISA Dover  [facility]

## 2023-10-17 NOTE — LETTER
October 17, 2023      [facility]  [address] [facility] [address]       Patient: Mary Alice Garcia   YOB: 1965  Date of Visit: 10/17/2023    To Whom It May Concern:    Anuel Garcia  was at [facility] on 10/17/2023. The patient may return to work on 10/17/23. If you have any questions or concerns, or if I can be of further assistance, please do not hesitate to contact me.    Sincerely,          Kelley Badillo NP

## 2023-10-18 ENCOUNTER — TELEPHONE (OUTPATIENT)
Dept: URGENT CARE | Facility: CLINIC | Age: 58
End: 2023-10-18
Payer: COMMERCIAL

## 2023-10-18 NOTE — TELEPHONE ENCOUNTER
Called Pt regarding her MRI.  Informed her that she is under Short Form Closure with the [facility] and she may be responsible for the bill.  She stated that she would like to be scheduled anyway.  Pt scheduled.

## 2023-10-23 ENCOUNTER — PATIENT MESSAGE (OUTPATIENT)
Dept: PRIMARY CARE CLINIC | Facility: CLINIC | Age: 58
End: 2023-10-23
Payer: COMMERCIAL

## 2023-10-23 ENCOUNTER — TELEPHONE (OUTPATIENT)
Dept: ENDOSCOPY | Facility: HOSPITAL | Age: 58
End: 2023-10-23
Payer: COMMERCIAL

## 2023-10-23 NOTE — TELEPHONE ENCOUNTER
Spoke with patient about arrival time @ 1100.   EGD/Colon (Suprep)    Prep instructions reviewed: the day before the procedure, follow a clear liquid diet all day, then start the first 1/2 of prep at 5pm and take 2nd 1/2 of prep @ 0600.  Pt must be completely NPO when prep completed @ 0800.  Suprep inst sent to portal.            Medications: Do not take Insulin or oral diabetic medications the day of the procedure.  Take as prescribed: heart, seizure and blood pressure medication in the morning with a sip of water (less than an ounce).  Take any breathing medications and bring inhalers to hospital with you Leave all valuables and jewelry at home.     Wear comfortable clothes to procedure to change into hospital gown You cannot drive for 24 hours after your procedure because you will receive sedation for your procedure to make you comfortable.  A ride must be provided at discharge.

## 2023-10-25 ENCOUNTER — HOSPITAL ENCOUNTER (OUTPATIENT)
Facility: HOSPITAL | Age: 58
Discharge: HOME | End: 2023-10-25
Attending: INTERNAL MEDICINE | Admitting: INTERNAL MEDICINE
Payer: COMMERCIAL

## 2023-10-25 ENCOUNTER — ANESTHESIA (OUTPATIENT)
Dept: ENDOSCOPY | Facility: HOSPITAL | Age: 58
End: 2023-10-25
Payer: COMMERCIAL

## 2023-10-25 ENCOUNTER — ANESTHESIA EVENT (OUTPATIENT)
Dept: ENDOSCOPY | Facility: HOSPITAL | Age: 58
End: 2023-10-25
Payer: COMMERCIAL

## 2023-10-25 VITALS
SYSTOLIC BLOOD PRESSURE: 104 MMHG | DIASTOLIC BLOOD PRESSURE: 84 MMHG | RESPIRATION RATE: 19 BRPM | WEIGHT: 153 LBS | HEIGHT: 63 IN | OXYGEN SATURATION: 99 % | BODY MASS INDEX: 27.11 KG/M2 | TEMPERATURE: 98 F | HEART RATE: 61 BPM

## 2023-10-25 DIAGNOSIS — Z12.11 COLON CANCER SCREENING: ICD-10-CM

## 2023-10-25 LAB
B-HCG UR QL: NEGATIVE
CTP QC/QA: YES

## 2023-10-25 PROCEDURE — 88342 CHG IMMUNOCYTOCHEMISTRY: ICD-10-PCS | Mod: 26,,, | Performed by: STUDENT IN AN ORGANIZED HEALTH CARE EDUCATION/TRAINING PROGRAM

## 2023-10-25 PROCEDURE — 25000003 PHARM REV CODE 250: Performed by: NURSE ANESTHETIST, CERTIFIED REGISTERED

## 2023-10-25 PROCEDURE — D9220A PRA ANESTHESIA: Mod: 33,CRNA,, | Performed by: NURSE ANESTHETIST, CERTIFIED REGISTERED

## 2023-10-25 PROCEDURE — D9220A PRA ANESTHESIA: ICD-10-PCS | Mod: 33,ANES,, | Performed by: ANESTHESIOLOGY

## 2023-10-25 PROCEDURE — 63600175 PHARM REV CODE 636 W HCPCS: Performed by: NURSE ANESTHETIST, CERTIFIED REGISTERED

## 2023-10-25 PROCEDURE — D9220A PRA ANESTHESIA: Mod: 33,ANES,, | Performed by: ANESTHESIOLOGY

## 2023-10-25 PROCEDURE — 88342 IMHCHEM/IMCYTCHM 1ST ANTB: CPT | Mod: 26,,, | Performed by: STUDENT IN AN ORGANIZED HEALTH CARE EDUCATION/TRAINING PROGRAM

## 2023-10-25 PROCEDURE — D9220A PRA ANESTHESIA: ICD-10-PCS | Mod: 33,CRNA,, | Performed by: NURSE ANESTHETIST, CERTIFIED REGISTERED

## 2023-10-25 PROCEDURE — 88305 TISSUE EXAM BY PATHOLOGIST: ICD-10-PCS | Mod: 26,,, | Performed by: STUDENT IN AN ORGANIZED HEALTH CARE EDUCATION/TRAINING PROGRAM

## 2023-10-25 PROCEDURE — 88305 TISSUE EXAM BY PATHOLOGIST: CPT | Mod: 26,,, | Performed by: STUDENT IN AN ORGANIZED HEALTH CARE EDUCATION/TRAINING PROGRAM

## 2023-10-25 PROCEDURE — 43239 EGD BIOPSY SINGLE/MULTIPLE: CPT | Performed by: INTERNAL MEDICINE

## 2023-10-25 PROCEDURE — 37000008 HC ANESTHESIA 1ST 15 MINUTES: Performed by: INTERNAL MEDICINE

## 2023-10-25 PROCEDURE — 81025 URINE PREGNANCY TEST: CPT | Performed by: INTERNAL MEDICINE

## 2023-10-25 PROCEDURE — G0121 COLON CA SCRN NOT HI RSK IND: ICD-10-PCS | Mod: ,,, | Performed by: INTERNAL MEDICINE

## 2023-10-25 PROCEDURE — 88342 IMHCHEM/IMCYTCHM 1ST ANTB: CPT | Performed by: STUDENT IN AN ORGANIZED HEALTH CARE EDUCATION/TRAINING PROGRAM

## 2023-10-25 PROCEDURE — 25000003 PHARM REV CODE 250: Performed by: INTERNAL MEDICINE

## 2023-10-25 PROCEDURE — 43239 EGD BIOPSY SINGLE/MULTIPLE: CPT | Mod: 51,,, | Performed by: INTERNAL MEDICINE

## 2023-10-25 PROCEDURE — 37000009 HC ANESTHESIA EA ADD 15 MINS: Performed by: INTERNAL MEDICINE

## 2023-10-25 PROCEDURE — G0121 COLON CA SCRN NOT HI RSK IND: HCPCS | Mod: ,,, | Performed by: INTERNAL MEDICINE

## 2023-10-25 PROCEDURE — G0121 COLON CA SCRN NOT HI RSK IND: HCPCS | Performed by: INTERNAL MEDICINE

## 2023-10-25 PROCEDURE — 88305 TISSUE EXAM BY PATHOLOGIST: CPT | Performed by: STUDENT IN AN ORGANIZED HEALTH CARE EDUCATION/TRAINING PROGRAM

## 2023-10-25 PROCEDURE — 27201012 HC FORCEPS, HOT/COLD, DISP: Performed by: INTERNAL MEDICINE

## 2023-10-25 PROCEDURE — 43239 PR EGD, FLEX, W/BIOPSY, SGL/MULTI: ICD-10-PCS | Mod: 51,,, | Performed by: INTERNAL MEDICINE

## 2023-10-25 RX ORDER — PROPOFOL 10 MG/ML
VIAL (ML) INTRAVENOUS
Status: DISCONTINUED | OUTPATIENT
Start: 2023-10-25 | End: 2023-10-25

## 2023-10-25 RX ORDER — LIDOCAINE HYDROCHLORIDE 20 MG/ML
INJECTION INTRAVENOUS
Status: DISCONTINUED | OUTPATIENT
Start: 2023-10-25 | End: 2023-10-25

## 2023-10-25 RX ORDER — SODIUM CHLORIDE 9 MG/ML
INJECTION, SOLUTION INTRAVENOUS CONTINUOUS
Status: DISCONTINUED | OUTPATIENT
Start: 2023-10-25 | End: 2023-10-25 | Stop reason: HOSPADM

## 2023-10-25 RX ORDER — PROPOFOL 10 MG/ML
VIAL (ML) INTRAVENOUS CONTINUOUS PRN
Status: DISCONTINUED | OUTPATIENT
Start: 2023-10-25 | End: 2023-10-25

## 2023-10-25 RX ORDER — DEXTROMETHORPHAN/PSEUDOEPHED 2.5-7.5/.8
DROPS ORAL
Status: COMPLETED | OUTPATIENT
Start: 2023-10-25 | End: 2023-10-25

## 2023-10-25 RX ADMIN — LIDOCAINE HYDROCHLORIDE 100 MG: 20 INJECTION, SOLUTION INTRAVENOUS at 01:10

## 2023-10-25 RX ADMIN — PROPOFOL 150 MCG/KG/MIN: 10 INJECTION, EMULSION INTRAVENOUS at 01:10

## 2023-10-25 RX ADMIN — SODIUM CHLORIDE: 0.9 INJECTION, SOLUTION INTRAVENOUS at 12:10

## 2023-10-25 RX ADMIN — GLYCOPYRROLATE 0.2 MG: 0.2 INJECTION, SOLUTION INTRAMUSCULAR; INTRAVITREAL at 01:10

## 2023-10-25 RX ADMIN — PROPOFOL 70 MG: 10 INJECTION, EMULSION INTRAVENOUS at 01:10

## 2023-10-25 NOTE — ANESTHESIA PREPROCEDURE EVALUATION
10/25/2023  Mary Alice Garcia is a 58 y.o., female.  Pre-operative evaluation for Procedure(s) (LRB):  COLONOSCOPY (N/A)  EGD (ESOPHAGOGASTRODUODENOSCOPY) (N/A)    Mary Alice Garcia is a 58 y.o. female     Patient Active Problem List   Diagnosis    Hypothyroid    GERD (gastroesophageal reflux disease)    Traumatic incomplete tear of left rotator cuff    Overweight (BMI 25.0-29.9)    Essential hypertension    Palpitations    Chest pain of uncertain etiology    Dyspnea on exertion    Sinus tachycardia    Family history of breast cancer in first degree relative    Menopausal symptoms    Pap smear of cervix shows high risk HPV present, type 18    Hearing loss of right ear due to cerumen impaction    Upper respiratory tract infection       Review of patient's allergies indicates:   Allergen Reactions    Paroxetine hcl Rash and Nausea And Vomiting       No current facility-administered medications on file prior to encounter.     Current Outpatient Medications on File Prior to Encounter   Medication Sig Dispense Refill    calcium carbonate (OS-FLO) 500 mg calcium (1,250 mg) tablet Take 2 tablets by mouth 2 (two) times daily.        cholecalciferol, vitamin D3, 125 mcg (5,000 unit) capsule Take 5,000 Units by mouth once daily.      fish oil-omega-3 fatty acids 300-1,000 mg capsule Take 2 g by mouth once daily.      norethindrone ac-eth estradioL (FEMHRT LOW DOSE) 0.5-2.5 mg-mcg per tablet Take 1 tablet by mouth once daily. 90 tablet 3    pantoprazole (PROTONIX) 40 MG tablet Take 1 tablet (40 mg total) by mouth 2 (two) times daily. 60 tablet 11    sodium,potassium,mag sulfates (SUPREP BOWEL PREP KIT) 17.5-3.13-1.6 gram SolR Take 177 mLs by mouth once daily. (Patient not taking: Reported on 10/17/2023) 1 kit 0    sucralfate (CARAFATE) 1 gram tablet Take 1 tablet (1 g total) by mouth 3 (three) times daily. 90 tablet 2  "      Past Surgical History:   Procedure Laterality Date    BUNIONECTOMY Left      SECTION      ESOPHAGOGASTRODUODENOSCOPY  2020    ESOPHAGOGASTRODUODENOSCOPY N/A 2020    Procedure: EGD (ESOPHAGOGASTRODUODENOSCOPY);  Surgeon: Rio Galeano MD;  Location: [facility];  Service: Endoscopy;  Laterality: N/A;    HEMORRHOID SURGERY      KNEE SURGERY      right knee    thyroid removed      TONSILLECTOMY      TUBAL LIGATION           CBC:  Lab Results   Component Value Date    WBC 11.17 2023    RBC 4.78 2023    HGB 14.0 2023    HCT 41.3 2023     2023    MCV 86 2023    MCH 29.3 2023    MCHC 33.9 2023       CMP:   Lab Results   Component Value Date     2023    K 4.7 2023     2023    CO2 25 2023    BUN 9 2023    CREATININE 0.7 2023     2023    CALCIUM 9.6 2023    ALBUMIN 3.9 2023    PROT 7.9 2023    ALKPHOS 76 2023    ALT 21 2023    AST 24 2023    BILITOT 0.4 2023       INR:  No results found for: "PT", "INR", "PROTIME", "APTT"      Diagnostic Studies:      EKG:   Results for orders placed or performed in visit on 20   IN OFFICE EKG 12-LEAD (to [place])    Collection Time: 20  3:03 PM    Narrative    Test Reason : I10,E03.9,R00.2,R07.89,R06.09,R07.89,    Vent. Rate : 101 BPM     Atrial Rate : 101 BPM     P-R Int : 132 ms          QRS Dur : 084 ms      QT Int : 350 ms       P-R-T Axes : 055 015 010 degrees     QTc Int : 453 ms    Sinus tachycardia  Otherwise normal ECG  When compared with ECG of 2020 05:40,  No significant change was found  Confirmed by Sukhdev Borges MD ([id number]) on 8/10/2020 12:59:32 PM    Referred By: LOURDES   SELF           Confirmed By:Sukhdev Borges MD        2D Echo:  No results found for this or any previous visit.    Stress Test:   No results found for this or any previous visit.               Pre-op " Assessment          Review of Systems      Physical Exam  General: Well nourished    Airway:  Mallampati: I / I  Mouth Opening: Normal  TM Distance: Normal  Tongue: Normal  Neck ROM: Normal ROM    Dental:  Intact    Chest/Lungs:  Clear to auscultation    Heart:  Rate: Normal  Rhythm: Regular Rhythm  Sounds: Normal        Anesthesia Plan  Type of Anesthesia, risks & benefits discussed:    Anesthesia Type: MAC, Gen ETT, Gen Supraglottic Airway, Gen Natural Airway  Intra-op Monitoring Plan: Standard ASA Monitors  Post Op Pain Control Plan: multimodal analgesia and peripheral nerve block  Induction:  IV  Informed Consent: Informed consent signed with the Patient and all parties understand the risks and agree with anesthesia plan.  All questions answered.   ASA Score: 2  Day of Surgery Review of History & Physical: H&P Update referred to the surgeon/provider.    Ready For Surgery From Anesthesia Perspective.     .

## 2023-10-25 NOTE — TRANSFER OF CARE
"Anesthesia Transfer of Care Note    Patient: Mary Alice Garcia    Procedure(s) Performed: Procedure(s) (LRB):  COLONOSCOPY (N/A)  EGD (ESOPHAGOGASTRODUODENOSCOPY) (N/A)    Patient location: GI    Anesthesia Type: general    Transport from OR: Transported from OR on room air with adequate spontaneous ventilation    Post pain: adequate analgesia    Post assessment: no apparent anesthetic complications    Post vital signs: stable    Level of consciousness: sedated and responds to stimulation    Nausea/Vomiting: no nausea/vomiting    Complications: none    Transfer of care protocol was followed      Last vitals: Visit Vitals  BP (!) 142/80 (BP Location: Left arm, Patient Position: Lying)   Pulse 64   Temp 36.7 °C (98.1 °F) (Temporal)   Resp 18   Ht 5' 3" (1.6 m)   Wt 69.4 kg (153 lb)   SpO2 98%   Breastfeeding No   BMI 27.10 kg/m²     "

## 2023-10-25 NOTE — PROVATION PATIENT INSTRUCTIONS
Discharge Summary/Instructions after an Endoscopic Procedure  Patient Name: Mary Alice Garcia  Patient MRN: [id number]  Patient YOB: 1965 Wednesday, October 25, 2023  Gadiel De MD  Dear patient,  As a result of recent federal legislation (The Federal Cures Act), you may   receive lab or pathology results from your procedure in your [facility]   account before your physician is able to contact you. Your physician or   their representative will relay the results to you with their   recommendations at their soonest availability.  Thank you,  Your health is very important to us during the Covid Crisis. Following your   procedure today, you will receive a daily text for 2 weeks asking about   signs or symptoms of Covid 19.  Please respond to this text when you   receive it so we can follow up and keep you as safe as possible.   RESTRICTIONS:  During your procedure today, you received medications for sedation.  These   medications may affect your judgment, balance and coordination.  Therefore,   for 24 hours, you have the following restrictions:   - DO NOT drive a car, operate machinery, make legal/financial decisions,   sign important papers or drink alcohol.    ACTIVITY:  Today: no heavy lifting, straining or running due to procedural   sedation/anesthesia.  The following day: return to full activity including work.  DIET:  Eat and drink normally unless instructed otherwise.     TREATMENT FOR COMMON SIDE EFFECTS:  - Mild abdominal pain, nausea, belching, bloating or excessive gas:  rest,   eat lightly and use a heating pad.  - Sore Throat: treat with throat lozenges and/or gargle with warm salt   water.  - Because air was used during the procedure, expelling large amounts of air   from your rectum or belching is normal.  - If a bowel prep was taken, you may not have a bowel movement for 1-3 days.    This is normal.  SYMPTOMS TO WATCH FOR AND REPORT TO YOUR PHYSICIAN:  1. Abdominal pain or bloating,  other than gas cramps.  2. Chest pain.  3. Back pain.  4. Signs of infection such as: chills or fever occurring within 24 hours   after the procedure.  5. Rectal bleeding, which would show as bright red, maroon, or black stools.   (A tablespoon of blood from the rectum is not serious, especially if   hemorrhoids are present.)  6. Vomiting.  7. Weakness or dizziness.  GO DIRECTLY TO THE NEAREST EMERGENCY ROOM IF YOU HAVE ANY OF THE FOLLOWING:      Difficulty breathing              Chills and/or fever over 101 F   Persistent vomiting and/or vomiting blood   Severe abdominal pain   Severe chest pain   Black, tarry stools   Bleeding- more than one tablespoon   Any other symptom or condition that you feel may need urgent attention  Your doctor recommends these additional instructions:  If any biopsies were taken, your doctors clinic will contact you in 1 to 2   weeks with any results.  - Discharge patient to home.   - Resume previous diet.   - Continue present medications.   - Await pathology results.   - Perform a colonoscopy as previously scheduled.  For questions, problems or results please call your physician - Gadiel De MD.  EMERGENCY PHONE NUMBER: [phone],  LAB RESULTS: [phone]  IF A COMPLICATION OR EMERGENCY SITUATION ARISES AND YOU ARE UNABLE TO REACH   YOUR PHYSICIAN - GO DIRECTLY TO THE EMERGENCY ROOM.  Gadiel De MD  10/25/2023 1:18:54 PM  This report has been verified and signed electronically.  Dear patient,  As a result of recent federal legislation (The Federal Cures Act), you may   receive lab or pathology results from your procedure in your [facility]   account before your physician is able to contact you. Your physician or   their representative will relay the results to you with their   recommendations at their soonest availability.  Thank you,  PROVATION

## 2023-10-25 NOTE — H&P
Short Stay Endoscopy History and Physical    PCP - Kelley Badillo NP    Procedure - Colonoscopy  ASA - II  Mallampati - per anesthesia  History of Anesthesia problems - no  Family history Anesthesia problems - no     HPI:  This is a 58 y.o. female here for evaluation of : Colon cancer screening    Family history of colon cancer - no  History of polyps - na  Change in bowel habits - no  Rectal bleeding - no      ROS:  Constitutional: No fevers, chills, No weight loss  ENT: No allergies  CV: No chest pain  Pulm: No shortness of breath  GI: see HPI  Derm: No rash    Medical History:  has a past medical history of Arthritis, Depression, Hyperlipidemia, Hypertension, Syncope and collapse, Thyroid disease, and Tuberculosis.    Surgical History:  has a past surgical history that includes  section; Tonsillectomy; Tubal ligation; Knee surgery; Hemorrhoid surgery; thyroid removed; Esophagogastroduodenoscopy (2020); Esophagogastroduodenoscopy (N/A, 2020); and Bunionectomy (Left).    Family History: family history includes Breast cancer in her mother and sister; Cancer in her maternal aunt and mother; Diabetes in her maternal grandmother and paternal grandmother; Drug abuse in her maternal aunt; Hyperlipidemia in her father; Hypertension in her brother and mother.. Otherwise no colon cancer, inflammatory bowel disease, or GI malignancies.    Social History:  reports that she has never smoked. She has never been exposed to tobacco smoke. She has never used smokeless tobacco. She reports current alcohol use of about 8.0 standard drinks of alcohol per week. She reports that she does not use drugs.    Review of patient's allergies indicates:   Allergen Reactions    Paroxetine hcl Rash and Nausea And Vomiting       Medications:   Medications Prior to Admission   Medication Sig Dispense Refill Last Dose    azelastine (ASTELIN) 137 mcg (0.1 %) nasal spray 1 spray (137 mcg total) by Nasal route 2 (two) times  daily. 30 mL 0 Past Month    calcium carbonate (OS-FLO) 500 mg calcium (1,250 mg) tablet Take 2 tablets by mouth 2 (two) times daily.     Past Week    cholecalciferol, vitamin D3, 125 mcg (5,000 unit) capsule Take 5,000 Units by mouth once daily.   Past Week    fish oil-omega-3 fatty acids 300-1,000 mg capsule Take 2 g by mouth once daily.   Past Week    fluticasone propionate (FLONASE) 50 mcg/actuation nasal spray SHAKE LIQUID AND USE 1 SPRAY(50 MCG) IN EACH NOSTRIL TWICE DAILY AS NEEDED FOR RHINITIS 48 g 0 Past Week    levothyroxine (SYNTHROID) 137 MCG Tab tablet Take 1 tablet (137 mcg total) by mouth before breakfast. 90 tablet 3 10/24/2023    methocarbamoL (ROBAXIN) 500 MG Tab Take 2 tablets (1,000 mg total) by mouth nightly as needed (muscle spasms). 30 tablet 0 Past Month    pantoprazole (PROTONIX) 40 MG tablet Take 1 tablet (40 mg total) by mouth 2 (two) times daily. 60 tablet 11 Past Week    propranoloL (INDERAL) 60 MG tablet Take 1 tablet (60 mg total) by mouth 2 (two) times daily. 180 tablet 3 10/25/2023    sodium,potassium,mag sulfates (SUPREP BOWEL PREP KIT) 17.5-3.13-1.6 gram SolR Take 177 mLs by mouth once daily. 1 kit 0 10/25/2023    [] amoxicillin-clavulanate 875-125mg (AUGMENTIN) 875-125 mg per tablet Take 1 tablet by mouth every 12 (twelve) hours. for 7 days 14 tablet 0     butalbital-acetaminophen-caffeine -40 mg (FIORICET, ESGIC) -40 mg per tablet Take 1 tablet by mouth every 4 (four) hours as needed for Pain. 30 tablet 0     celecoxib (CELEBREX) 200 MG capsule Take 1 capsule (200 mg total) by mouth 2 (two) times daily. 30 capsule 1     furosemide (LASIX) 20 MG tablet Take 1 tablet (20 mg total) by mouth once daily. for 3 days 3 tablet 0     methylPREDNISolone (MEDROL DOSEPACK) 4 mg tablet use as directed (Patient not taking: Reported on 10/17/2023) 1 each 0     norethindrone ac-eth estradioL (FEMHRT LOW DOSE) 0.5-2.5 mg-mcg per tablet Take 1 tablet by mouth once daily. 90  tablet 3 More than a month    sucralfate (CARAFATE) 1 gram tablet Take 1 tablet (1 g total) by mouth 3 (three) times daily. 90 tablet 2          Objective Findings:    Vital Signs: see nursing notes  Physical Exam:  General Appearance: Well appearing in no acute distress  Eyes:    No scleral icterus  ENT: Neck supple  Lungs: CTA anteriorly  Heart:  S1, S2 normal, no murmurs heard  Abdomen: Soft, non tender, non distended with positive bowel sounds. No hepatosplenomegaly, ascites, or mass  Extremities: no edema  Skin: No rash      Labs:  Lab Results   Component Value Date    WBC 11.17 09/14/2023    HGB 14.0 09/14/2023    HCT 41.3 09/14/2023     09/14/2023    CHOL 218 (H) 07/11/2023    TRIG 142 07/11/2023    HDL 53 07/11/2023    ALT 21 07/18/2023    AST 24 07/18/2023     07/18/2023    K 4.7 07/18/2023     07/18/2023    CREATININE 0.7 07/18/2023    BUN 9 07/18/2023    CO2 25 07/18/2023    TSH 1.012 10/17/2023    HGBA1C 5.4 07/11/2023       I have explained the risks and benefits of endoscopy procedures to the patient including but not limited to bleeding, perforation, infection, and death.    Gadiel De MD

## 2023-10-25 NOTE — PROVATION PATIENT INSTRUCTIONS
Discharge Summary/Instructions after an Endoscopic Procedure  Patient Name: Mary Alice Garcia  Patient MRN: [id number]  Patient YOB: 1965 Wednesday, October 25, 2023  Gadiel De MD  Dear patient,  As a result of recent federal legislation (The Federal Cures Act), you may   receive lab or pathology results from your procedure in your [facility]   account before your physician is able to contact you. Your physician or   their representative will relay the results to you with their   recommendations at their soonest availability.  Thank you,  Your health is very important to us during the Covid Crisis. Following your   procedure today, you will receive a daily text for 2 weeks asking about   signs or symptoms of Covid 19.  Please respond to this text when you   receive it so we can follow up and keep you as safe as possible.   RESTRICTIONS:  During your procedure today, you received medications for sedation.  These   medications may affect your judgment, balance and coordination.  Therefore,   for 24 hours, you have the following restrictions:   - DO NOT drive a car, operate machinery, make legal/financial decisions,   sign important papers or drink alcohol.    ACTIVITY:  Today: no heavy lifting, straining or running due to procedural   sedation/anesthesia.  The following day: return to full activity including work.  DIET:  Eat and drink normally unless instructed otherwise.     TREATMENT FOR COMMON SIDE EFFECTS:  - Mild abdominal pain, nausea, belching, bloating or excessive gas:  rest,   eat lightly and use a heating pad.  - Sore Throat: treat with throat lozenges and/or gargle with warm salt   water.  - Because air was used during the procedure, expelling large amounts of air   from your rectum or belching is normal.  - If a bowel prep was taken, you may not have a bowel movement for 1-3 days.    This is normal.  SYMPTOMS TO WATCH FOR AND REPORT TO YOUR PHYSICIAN:  1. Abdominal pain or bloating,  other than gas cramps.  2. Chest pain.  3. Back pain.  4. Signs of infection such as: chills or fever occurring within 24 hours   after the procedure.  5. Rectal bleeding, which would show as bright red, maroon, or black stools.   (A tablespoon of blood from the rectum is not serious, especially if   hemorrhoids are present.)  6. Vomiting.  7. Weakness or dizziness.  GO DIRECTLY TO THE NEAREST EMERGENCY ROOM IF YOU HAVE ANY OF THE FOLLOWING:      Difficulty breathing              Chills and/or fever over 101 F   Persistent vomiting and/or vomiting blood   Severe abdominal pain   Severe chest pain   Black, tarry stools   Bleeding- more than one tablespoon   Any other symptom or condition that you feel may need urgent attention  Your doctor recommends these additional instructions:  If any biopsies were taken, your doctors clinic will contact you in 1 to 2   weeks with any results.  - Discharge patient to home.   - Resume previous diet.   - Continue present medications.   - Repeat colonoscopy in 10 years for screening purposes.   - Patient has a contact number available for emergencies.  The signs and   symptoms of potential delayed complications were discussed with the   patient.  Return to normal activities tomorrow.  Written discharge   instructions were provided to the patient.  For questions, problems or results please call your physician - Gadile De MD.  EMERGENCY PHONE NUMBER: [phone],  LAB RESULTS: [phone]  IF A COMPLICATION OR EMERGENCY SITUATION ARISES AND YOU ARE UNABLE TO REACH   YOUR PHYSICIAN - GO DIRECTLY TO THE EMERGENCY ROOM.  Gadiel De MD  10/25/2023 1:34:28 PM  This report has been verified and signed electronically.  Dear patient,  As a result of recent federal legislation (The Federal Cures Act), you may   receive lab or pathology results from your procedure in your [facility]   account before your physician is able to contact you. Your physician or    their representative will relay the results to you with their   recommendations at their soonest availability.  Thank you,  PROVATION

## 2023-10-25 NOTE — ANESTHESIA POSTPROCEDURE EVALUATION
Anesthesia Post Evaluation    Patient: Mary Alice Garcia    Procedure(s) Performed: Procedure(s) (LRB):  COLONOSCOPY (N/A)  EGD (ESOPHAGOGASTRODUODENOSCOPY) (N/A)    Final Anesthesia Type: general      Patient location during evaluation: PACU  Patient participation: Yes- Able to Participate  Level of consciousness: awake and alert  Post-procedure vital signs: reviewed and stable  Pain management: adequate  Airway patency: patent    PONV status at discharge: No PONV  Anesthetic complications: no      Cardiovascular status: blood pressure returned to baseline  Respiratory status: unassisted  Hydration status: euvolemic  Follow-up not needed.          Vitals Value Taken Time   /84 10/25/23 1406   Temp 36.4 °C (97.5 °F) 10/25/23 1338   Pulse 61 10/25/23 1406   Resp 19 10/25/23 1406   SpO2 99 % 10/25/23 1406         Event Time   Out of Recovery 14:11:03         Pain/Nahomy Score: No data recorded

## 2023-10-26 NOTE — TELEPHONE ENCOUNTER
Spoke with patient to inform her that paperwork is ready for  and will be on the 3rd floor at check out.

## 2023-10-30 LAB
FINAL PATHOLOGIC DIAGNOSIS: NORMAL
GROSS: NORMAL
Lab: NORMAL
MICROSCOPIC EXAM: NORMAL

## 2023-11-03 ENCOUNTER — TELEPHONE (OUTPATIENT)
Dept: URGENT CARE | Facility: CLINIC | Age: 58
End: 2023-11-03
Payer: COMMERCIAL

## 2023-11-03 ENCOUNTER — TELEPHONE (OUTPATIENT)
Dept: OBSTETRICS AND GYNECOLOGY | Facility: CLINIC | Age: 58
End: 2023-11-03
Payer: COMMERCIAL

## 2023-11-03 NOTE — TELEPHONE ENCOUNTER
Pt call was returned. Pt stated she thinks she has a uti. I advised pt to go to the urgent care. She said would wait until Monday to see the provider.          ----- Message from Kt Torres sent at 11/3/2023  1:18 PM CDT -----  Type: Patient Call Back    Who called:self    What is the request in detail:uti    Can the clinic reply by [facility]?no    Would the patient rather a call back or a response via [facility]? call    Best call back number:[phone]    Additional Information:

## 2023-11-07 NOTE — PROGRESS NOTES
Pathologic findings for recent EGD reviewed.  No evidence of H pylori infection identified.  Some mild inflammation was noted.  Continue current medications.  Follow up as needed with Ly

## 2023-11-08 ENCOUNTER — OFFICE VISIT (OUTPATIENT)
Dept: OBSTETRICS AND GYNECOLOGY | Facility: CLINIC | Age: 58
End: 2023-11-08
Payer: COMMERCIAL

## 2023-11-08 VITALS
DIASTOLIC BLOOD PRESSURE: 88 MMHG | HEIGHT: 63 IN | SYSTOLIC BLOOD PRESSURE: 118 MMHG | WEIGHT: 153.69 LBS | BODY MASS INDEX: 27.23 KG/M2

## 2023-11-08 DIAGNOSIS — R39.9 UTI SYMPTOMS: ICD-10-CM

## 2023-11-08 DIAGNOSIS — N76.0 ACUTE VAGINITIS: Primary | ICD-10-CM

## 2023-11-08 DIAGNOSIS — N89.8 VAGINAL DISCHARGE: ICD-10-CM

## 2023-11-08 LAB
BILIRUB UR QL STRIP: NEGATIVE
GLUCOSE UR QL STRIP: NEGATIVE
KETONES UR QL STRIP: NEGATIVE
LEUKOCYTE ESTERASE UR QL STRIP: NEGATIVE
PH, POC UA: 7
POC BLOOD, URINE: NEGATIVE
POC NITRATES, URINE: NEGATIVE
PROT UR QL STRIP: NEGATIVE
SP GR UR STRIP: 1.01 (ref 1–1.03)
UROBILINOGEN UR STRIP-ACNC: NORMAL (ref 0.1–1.1)

## 2023-11-08 PROCEDURE — 1160F PR REVIEW ALL MEDS BY PRESCRIBER/CLIN PHARMACIST DOCUMENTED: ICD-10-PCS | Mod: CPTII,S$GLB,, | Performed by: OBSTETRICS & GYNECOLOGY

## 2023-11-08 PROCEDURE — 99214 OFFICE O/P EST MOD 30 MIN: CPT | Mod: S$GLB,,, | Performed by: OBSTETRICS & GYNECOLOGY

## 2023-11-08 PROCEDURE — 87086 URINE CULTURE/COLONY COUNT: CPT

## 2023-11-08 PROCEDURE — 3074F SYST BP LT 130 MM HG: CPT | Mod: CPTII,S$GLB,, | Performed by: OBSTETRICS & GYNECOLOGY

## 2023-11-08 PROCEDURE — 3008F BODY MASS INDEX DOCD: CPT | Mod: CPTII,S$GLB,, | Performed by: OBSTETRICS & GYNECOLOGY

## 2023-11-08 PROCEDURE — 1159F PR MEDICATION LIST DOCUMENTED IN MEDICAL RECORD: ICD-10-PCS | Mod: CPTII,S$GLB,, | Performed by: OBSTETRICS & GYNECOLOGY

## 2023-11-08 PROCEDURE — 3079F PR MOST RECENT DIASTOLIC BLOOD PRESSURE 80-89 MM HG: ICD-10-PCS | Mod: CPTII,S$GLB,, | Performed by: OBSTETRICS & GYNECOLOGY

## 2023-11-08 PROCEDURE — 99999 PR PBB SHADOW E&M-EST. PATIENT-LVL IV: ICD-10-PCS | Mod: PBBFAC,,,

## 2023-11-08 PROCEDURE — 3079F DIAST BP 80-89 MM HG: CPT | Mod: CPTII,S$GLB,, | Performed by: OBSTETRICS & GYNECOLOGY

## 2023-11-08 PROCEDURE — 99999 PR PBB SHADOW E&M-EST. PATIENT-LVL IV: CPT | Mod: PBBFAC,,,

## 2023-11-08 PROCEDURE — 99214 PR OFFICE/OUTPT VISIT, EST, LEVL IV, 30-39 MIN: ICD-10-PCS | Mod: S$GLB,,, | Performed by: OBSTETRICS & GYNECOLOGY

## 2023-11-08 PROCEDURE — 81003 URINALYSIS AUTO W/O SCOPE: CPT | Mod: QW,S$GLB,, | Performed by: OBSTETRICS & GYNECOLOGY

## 2023-11-08 PROCEDURE — 1160F RVW MEDS BY RX/DR IN RCRD: CPT | Mod: CPTII,S$GLB,, | Performed by: OBSTETRICS & GYNECOLOGY

## 2023-11-08 PROCEDURE — 81514 NFCT DS BV&VAGINITIS DNA ALG: CPT

## 2023-11-08 PROCEDURE — 1159F MED LIST DOCD IN RCRD: CPT | Mod: CPTII,S$GLB,, | Performed by: OBSTETRICS & GYNECOLOGY

## 2023-11-08 PROCEDURE — 81003 POCT URINALYSIS, DIPSTICK, AUTOMATED, W/O SCOPE: ICD-10-PCS | Mod: QW,S$GLB,, | Performed by: OBSTETRICS & GYNECOLOGY

## 2023-11-08 PROCEDURE — 3044F HG A1C LEVEL LT 7.0%: CPT | Mod: CPTII,S$GLB,, | Performed by: OBSTETRICS & GYNECOLOGY

## 2023-11-08 PROCEDURE — 3074F PR MOST RECENT SYSTOLIC BLOOD PRESSURE < 130 MM HG: ICD-10-PCS | Mod: CPTII,S$GLB,, | Performed by: OBSTETRICS & GYNECOLOGY

## 2023-11-08 PROCEDURE — 3044F PR MOST RECENT HEMOGLOBIN A1C LEVEL <7.0%: ICD-10-PCS | Mod: CPTII,S$GLB,, | Performed by: OBSTETRICS & GYNECOLOGY

## 2023-11-08 PROCEDURE — 3008F PR BODY MASS INDEX (BMI) DOCUMENTED: ICD-10-PCS | Mod: CPTII,S$GLB,, | Performed by: OBSTETRICS & GYNECOLOGY

## 2023-11-08 RX ORDER — METRONIDAZOLE 500 MG/1
500 TABLET ORAL EVERY 12 HOURS
Qty: 14 TABLET | Refills: 0 | Status: SHIPPED | OUTPATIENT
Start: 2023-11-08 | End: 2023-11-15

## 2023-11-08 RX ORDER — FLUCONAZOLE 150 MG/1
150 TABLET ORAL ONCE
Qty: 1 TABLET | Refills: 0 | Status: SHIPPED | OUTPATIENT
Start: 2023-11-08 | End: 2023-11-08

## 2023-11-08 NOTE — PROGRESS NOTES
CC: UTI symptoms and vaginal discharge    HPI:  Mary Alice Garcia is a 58 y.o. female  presents to walk in clinic with complaint of vaginal discharge and UTI symptoms for the past few days. States that she noticed cloudy urine with burning. Also reports frequency, drinks lots of water. No fever or hematuria, some back pain. Reports vaginal discharge with odor, no itching. Not currently sexually active.     Past Medical History:   Diagnosis Date    Arthritis     Depression     Hyperlipidemia     Hypertension     2013    Syncope and collapse     Thyroid disease     Tuberculosis      Past Surgical History:   Procedure Laterality Date    BUNIONECTOMY Left      SECTION      COLONOSCOPY N/A 10/25/2023    Procedure: COLONOSCOPY;  Surgeon: Gadiel De MD;  Location: [facility];  Service: Endoscopy;  Laterality: N/A;    ESOPHAGOGASTRODUODENOSCOPY  2020    ESOPHAGOGASTRODUODENOSCOPY N/A 2020    Procedure: EGD (ESOPHAGOGASTRODUODENOSCOPY);  Surgeon: Rio Galeano MD;  Location: [facility];  Service: Endoscopy;  Laterality: N/A;    ESOPHAGOGASTRODUODENOSCOPY N/A 10/25/2023    Procedure: EGD (ESOPHAGOGASTRODUODENOSCOPY);  Surgeon: Gadiel De MD;  Location: [facility];  Service: Endoscopy;  Laterality: N/A;    HEMORRHOID SURGERY      KNEE SURGERY      right knee    thyroid removed      TONSILLECTOMY      TUBAL LIGATION       Social History     Tobacco Use    Smoking status: Never     Passive exposure: Never    Smokeless tobacco: Never   Substance Use Topics    Alcohol use: Yes     Alcohol/week: 8.0 standard drinks of alcohol     Types: 3 Glasses of wine, 5 Cans of beer per week    Drug use: No     Family History   Problem Relation Age of Onset    Breast cancer Mother     Cancer Mother         breast cancer    Hypertension Mother     Hyperlipidemia Father     Breast cancer Sister     Hypertension Brother     Diabetes Maternal Grandmother     Diabetes Paternal Grandmother      "Cancer Maternal Aunt     Drug abuse Maternal Aunt      OB History    Para Term  AB Living   3 3 3     3   SAB IAB Ectopic Multiple Live Births           3      # Outcome Date GA Lbr Hi/2nd Weight Sex Delivery Anes PTL Lv   3 Term            2 Term            1 Term                /88   Ht 5' 3" (1.6 m)   Wt 69.7 kg (153 lb 10.6 oz)   BMI 27.22 kg/m²     ROS:  GENERAL: No fever, chills, fatigability or weight loss.  VULVAR: No pain, no lesions and no itching.  VAGINAL: No relaxation, no itching, no abnormal bleeding and no lesions. + discharge, odor and itching  ABDOMEN: No abdominal pain. Denies nausea. Denies vomiting. No diarrhea. No constipation  BREAST: Denies pain. No lumps. No discharge.  URINARY: No incontinence, no nocturia, + cloudy urine, burning  CARDIOVASCULAR: No chest pain. No shortness of breath. No leg cramps.  NEUROLOGICAL: No headaches. No vision changes.    PHYSICAL EXAM:  VULVA: normal appearing vulva with no masses, tenderness or lesions   VAGINA: normal appearing vagina with normal color. Thick yellow discharge, malodorous, no lesions   CERVIX: normal appearing cervix without discharge or lesions   UTERUS: uterus is normal size, shape, consistency and nontender   ADNEXA: normal adnexa in size, nontender and no masses    ASSESSMENT and PLAN:    ICD-10-CM ICD-9-CM    1. Acute vaginitis  N76.0 616.10 metroNIDAZOLE (FLAGYL) 500 MG tablet      fluconazole (DIFLUCAN) 150 MG Tab      2. Vaginal discharge  N89.8 623.5 Vaginosis Screen by DNA Probe      metroNIDAZOLE (FLAGYL) 500 MG tablet      fluconazole (DIFLUCAN) 150 MG Tab      3. UTI symptoms  R39.9 788.99 CULTURE, URINE        - AFFIRM collected  - U Dip negative  - Urine culture sent   - Flagyl sent due to suspected BV  - Diflucan PRN yeast     Patient was counseled today on vaginitis prevention including :  a. avoiding feminine products such as deoderant soaps, body wash, bubble bath, douches, scented toilet paper, " deoderant tampons or pads, feminine wipes, chronic pad use, etc.  b. avoiding other vulvovaginal irritants such as long hot baths, humidity, tight, synthetic clothing, chlorine and sitting around in wet bathing suits  c. wearing cotton underwear, avoiding thong underwear and no underwear to bed  d. taking showers instead of baths and use a hair dryer on cool setting afterwards to dry  e. wearing cotton to exercise and shower immediately after exercise and change clothes  f. using polyurethane condoms without spermicide if sexually active and symptoms are triggered by intercourse    -UTI precautions:  Wipe front to back and avoid constipation.  Avoid caffeine.  Drink lots of water  Void every 3-4 hrs.  Expel urine from vagina post void  No dryer sheets or harsh detergents with the undergarments  No bubble baths  Void before and after intercourse  Avoid hot tub use  Void soon after urge arises  Avoid tight fitting clothes and panty hose    FOLLOW UP: PRN lack of improvement.    Sandra Driscoll, NP-C  [facility]

## 2023-11-09 ENCOUNTER — HOSPITAL ENCOUNTER (OUTPATIENT)
Dept: RADIOLOGY | Facility: HOSPITAL | Age: 58
Discharge: HOME | End: 2023-11-09
Attending: PHYSICIAN ASSISTANT
Payer: OTHER GOVERNMENT

## 2023-11-09 DIAGNOSIS — M54.10 BACK PAIN WITH LEFT-SIDED RADICULOPATHY: ICD-10-CM

## 2023-11-09 DIAGNOSIS — M54.9 DORSALGIA, UNSPECIFIED: ICD-10-CM

## 2023-11-09 LAB
BACTERIA UR CULT: NORMAL
BACTERIA UR CULT: NORMAL
BACTERIAL VAGINOSIS DNA: POSITIVE
CANDIDA GLABRATA DNA: NEGATIVE
CANDIDA KRUSEI DNA: NEGATIVE
CANDIDA RRNA VAG QL PROBE: NEGATIVE
T VAGINALIS RRNA GENITAL QL PROBE: NEGATIVE

## 2023-11-09 PROCEDURE — 72148 MRI LUMBAR SPINE W/O DYE: CPT | Mod: TC

## 2023-11-09 PROCEDURE — 72148 MRI LUMBAR SPINE W/O DYE: CPT | Mod: 26,,, | Performed by: RADIOLOGY

## 2023-11-09 PROCEDURE — 72148 MRI LUMBAR SPINE WITHOUT CONTRAST: ICD-10-PCS | Mod: 26,,, | Performed by: RADIOLOGY

## 2023-11-15 ENCOUNTER — OFFICE VISIT (OUTPATIENT)
Dept: URGENT CARE | Facility: CLINIC | Age: 58
End: 2023-11-15
Payer: OTHER GOVERNMENT

## 2023-11-15 VITALS
DIASTOLIC BLOOD PRESSURE: 95 MMHG | SYSTOLIC BLOOD PRESSURE: 139 MMHG | HEIGHT: 63 IN | HEART RATE: 71 BPM | RESPIRATION RATE: 19 BRPM | BODY MASS INDEX: 27.19 KG/M2 | WEIGHT: 153.44 LBS | OXYGEN SATURATION: 98 % | TEMPERATURE: 98 F

## 2023-11-15 DIAGNOSIS — S39.012D STRAIN OF LUMBAR REGION, SUBSEQUENT ENCOUNTER: Primary | ICD-10-CM

## 2023-11-15 DIAGNOSIS — Z02.6 ENCOUNTER RELATED TO WORKER'S COMPENSATION CLAIM: ICD-10-CM

## 2023-11-15 DIAGNOSIS — M51.36 ANNULAR TEAR OF LUMBAR DISC: ICD-10-CM
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
## 2023-11-15 PROCEDURE — 99213 PR OFFICE/OUTPT VISIT, EST, LEVL III, 20-29 MIN: ICD-10-PCS | Mod: S$GLB,,, | Performed by: PHYSICIAN ASSISTANT

## 2023-11-15 PROCEDURE — 99213 OFFICE O/P EST LOW 20 MIN: CPT | Mod: S$GLB,,, | Performed by: PHYSICIAN ASSISTANT

## 2023-11-15 RX ORDER — IBUPROFEN 400 MG/1
400 TABLET ORAL EVERY 8 HOURS PRN
Qty: 60 TABLET | Refills: 0 | Status: SHIPPED | OUTPATIENT
Start: 2023-11-15 | End: 2024-02-26

## 2023-11-15 RX ORDER — METHOCARBAMOL 500 MG/1
1000 TABLET, FILM COATED ORAL 2 TIMES DAILY PRN
Qty: 30 TABLET | Refills: 0 | Status: SHIPPED | OUTPATIENT
Start: 2023-11-15 | End: 2024-02-26

## 2023-11-15 NOTE — PROGRESS NOTES
Subjective:      Patient ID: Mary Alice Garcia is a 58 y.o. female.    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  She continues to have persistent pain in the low back to the left buttock.  Patient had an MRI of the L-spine recently in his here for review.  She is currently working light duty. [employer]    Back Pain  Pertinent negatives include no numbness.     Constitution: Positive for activity change.   Musculoskeletal:  Positive for pain and back pain.   Neurological:  Negative for numbness and tingling.     Objective:     Physical Exam  Vitals and nursing note reviewed.   Constitutional:       General: She is not in acute distress.     Appearance: Normal appearance. She is well-developed.   HENT:      Head: Normocephalic and atraumatic.      Right Ear: Hearing and external ear normal.      Left Ear: Hearing and external ear normal.      Nose: Nose normal. No nasal deformity.   Eyes:      General: Lids are normal.      Conjunctiva/sclera: Conjunctivae normal.      Right eye: Right conjunctiva is not injected.      Left eye: Left conjunctiva is not injected.   Neck:      Trachea: Trachea normal.   Cardiovascular:      Pulses: Normal pulses.           Dorsalis pedis pulses are 2+ on the right side and 2+ on the left side.        Posterior tibial pulses are 2+ on the right side and 2+ on the left side.   Pulmonary:      Effort: Pulmonary effort is normal. No respiratory distress.      Breath sounds: No stridor.   Musculoskeletal:      Cervical back: Normal and normal range of motion. No spinous process tenderness or muscular tenderness.      Thoracic back: Normal.      Lumbar back:  Tenderness present. No deformity. Decreased range of motion. Positive left straight leg raise test. Negative right straight leg raise test.        Back:    Skin:     General: Skin is warm and dry.      Findings: No abrasion or bruising.   Neurological:      General: No focal deficit present.      Mental Status: She is alert.   Psychiatric:         Attention and Perception: She is attentive.         Speech: Speech normal.         Behavior: Behavior normal.         Thought Content: Thought content normal.          MRI Lumbar Spine Without Contrast    Result Date: 11/9/2023  EXAMINATION: MRI LUMBAR SPINE WITHOUT CONTRAST CLINICAL HISTORY: Low back pain, progressive neurologic deficit; Radiculopathy, site unspecified TECHNIQUE: Multiplanar, multisequence MR images were acquired from the thoracolumbar junction to the sacrum without the administration of contrast. COMPARISON: Lumbar spine radiograph 08/22/2023 FINDINGS: ALIGNMENT: Sagittal alignment is maintained. BONE: No compression fractures.  No marrow replacing lesions. Disc: Mild disc desiccation at L2-L3 and L5-S1. No bone marrow edema. SPINAL CANAL: The conus medullaris has a normal appearance and terminates at the L1 level.  Cauda equina nerve roots are unremarkable.  No epidural mass or collection. PARASPINAL SOFT TISSUES: Cholelithiasis.  The visualized portions of the common bile duct is within normal limits of caliber SIGNIFICANT FINDINGS BY LEVEL: T12-L1:  No spinal canal stenosis or neural foraminal narrowing. L1-2: No spinal canal stenosis or neural foraminal narrowing. L2-3: Minimal circumferential disc bulge.  No significant spinal canal stenosis or neural foraminal narrowing. L3-4: Minimal bilateral facet arthropathy.  No spinal canal stenosis or neural foraminal narrowing. L4-5: No spinal canal stenosis or neural foraminal narrowing. L5-S1: Mild broad-based posterior disc bulge.  Posterior annular fissure.  No spinal canal stenosis or neural foraminal  narrowing.     No acute fracture or traumatic malalignment of the lumbar spine. Mild degenerative changes including a posterior annular fissure at L5-S1.  No significant spinal canal stenosis or neural foraminal narrowing. Cholelithiasis. Electronically signed by resident: Damien Armando Date:    11/09/2023 Time:    19:50 Electronically signed by: Dave Ellington MD Date:    11/09/2023 Time:    20:16     Assessment:      1. Strain of lumbar region, subsequent encounter    2. Encounter related to worker's compensation claim    3. Annular tear of lumbar disc    4. Work related injury      Plan:       MRI L-spine reveals mild broad disc bulge and annular fissure at L5-S1.  Physical therapy will be reordered as initial order was denied.  Encouraged Home stretches and warm soaks.    It is with a high degree of medical certainty that this patient's current signs and symptoms were caused or exacerbated by the work related injury mentioned in the note above     Medications Ordered This Encounter   Medications    ibuprofen (ADVIL,MOTRIN) 400 MG tablet     Sig: Take 1 tablet (400 mg total) by mouth every 8 (eight) hours as needed for Other. Take with food.     Dispense:  60 tablet     Refill:  0    methocarbamoL (ROBAXIN) 500 MG Tab     Sig: Take 2 tablets (1,000 mg total) by mouth 2 (two) times daily as needed (muscle spasms).     Dispense:  30 tablet     Refill:  0     Patient Instructions: Attention not to aggravate affected area, Daily home exercises/warm soaks, PT to be scheduled once authorized   Restrictions:  (See CA-17)  Follow up in about 3 weeks (around 12/6/2023).

## 2023-11-15 NOTE — LETTER
Urgent Care - [address] [address]  Phone: [phone]  Fax: [phone]  [facility] [facility]: [id number]    Pt Name: Mary Alice Garcia  Injury Date: 08/19/2023   Employee ID:  Date of First Treatment: 11/15/2023   Company: [employer]      Appointment Time: 12:45 PM Arrived: 12:51 PM   Provider: Garrick Hernandez PA-C Time Out: 2:10 PM     Office Treatment:   1. Herniation of intervertebral disc between L5 and S1    2. Encounter related to worker's compensation claim    3. Annular tear of lumbar disc    4. Work related injury      Medications Ordered This Encounter   Medications    ibuprofen (ADVIL,MOTRIN) 400 MG tablet    methocarbamoL (ROBAXIN) 500 MG Tab      Patient Instructions: Attention not to aggravate affected area, Daily home exercises/warm soaks, PT to be scheduled once authorized    Restrictions:  (See CA-17)     Return Appointment: Wednesday 12/6/2023 at 1:00 PM

## 2023-12-07 ENCOUNTER — TELEPHONE (OUTPATIENT)
Dept: URGENT CARE | Facility: CLINIC | Age: 58
End: 2023-12-07
Payer: COMMERCIAL

## 2023-12-07 NOTE — TELEPHONE ENCOUNTER
Called the patient in reference to her missed [facility] Appointment and the patient verbalized  the [facility]  Appt, date & time. AFG

## 2023-12-28 ENCOUNTER — TELEPHONE (OUTPATIENT)
Dept: URGENT CARE | Facility: CLINIC | Age: 58
End: 2023-12-28
Payer: COMMERCIAL

## 2023-12-28 NOTE — TELEPHONE ENCOUNTER
Called patient in reference to there missed [facility] Appointment and there was no answer, I received the patients voicemail, left a message for the patient and the reason for the call. AFG

## 2024-01-03 ENCOUNTER — TELEPHONE (OUTPATIENT)
Dept: URGENT CARE | Facility: CLINIC | Age: 59
End: 2024-01-03
Payer: COMMERCIAL

## 2024-01-03 NOTE — TELEPHONE ENCOUNTER
Pt called about her appointments and PT.  I let her know the PT referral was closed due to her claim still not being accepted by the [employer].  I've also let her know she's nearing the [address]ory and this will close her case for treatment if the claim has not been accepted.

## 2024-01-10 ENCOUNTER — OFFICE VISIT (OUTPATIENT)
Dept: URGENT CARE | Facility: CLINIC | Age: 59
End: 2024-01-10
Payer: OTHER GOVERNMENT

## 2024-01-10 VITALS
HEART RATE: 70 BPM | OXYGEN SATURATION: 96 % | DIASTOLIC BLOOD PRESSURE: 89 MMHG | TEMPERATURE: 99 F | BODY MASS INDEX: 27.11 KG/M2 | SYSTOLIC BLOOD PRESSURE: 140 MMHG | WEIGHT: 153 LBS | RESPIRATION RATE: 16 BRPM | HEIGHT: 63 IN
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
## 2024-01-10 DIAGNOSIS — M51.36 ANNULAR TEAR OF LUMBAR DISC: ICD-10-CM

## 2024-01-10 DIAGNOSIS — S39.012D STRAIN OF LUMBAR REGION, SUBSEQUENT ENCOUNTER: Primary | ICD-10-CM

## 2024-01-10 PROCEDURE — 99213 OFFICE O/P EST LOW 20 MIN: CPT | Mod: S$GLB,,, | Performed by: PHYSICIAN ASSISTANT

## 2024-01-10 RX ORDER — MELOXICAM 15 MG/1
15 TABLET ORAL DAILY
Qty: 30 TABLET | Refills: 0 | Status: SHIPPED | OUTPATIENT
Start: 2024-01-10

## 2024-01-10 NOTE — LETTER
Urgent Care - [address] [address]  Phone: [phone]  Fax: [phone]  [facility]: [id number]    Pt Name: Mary Alice Garcia  Injury Date: 08/19/2023   Employee ID: [id number] Date of First Treatment: 01/10/2024   Company: [employer]      Appointment Time: 07:15 AM Arrived: 7:30am   Provider: Garrick Hernandez PA-C Time Out:09:05am     Office Treatment:   1. Strain of lumbar region, subsequent encounter    2. Annular tear of lumbar disc    3. Work related injury      Medications Ordered This Encounter   Medications    meloxicam (MOBIC) 15 MG tablet      Patient Instructions: Attention not to aggravate affected area, Daily home exercises/warm soaks, PT to be scheduled once authorized    Restrictions:  (See CA-17)     Return Appointment: 2/7/2024 at 8:00am

## 2024-01-10 NOTE — PROGRESS NOTES
Subjective:      Patient ID: Mar yAlice Garcia is a 58 y.o. female.    Chief Complaint: Back Pain    Patient's place of employment - [employer]  Patient's job title -   Date of Injury - 08/19/2023  Body part injured - lower back  Current work status per last visit - light duty  Improved, same, or worse - slight improvement   Pain Scale right now (1-10) -  4/10       LB    Presents for follow-up low back injury that occurred almost 5 months ago.  She reports slight improvement since last office visit.  She has not been to physical therapy has it was denied by insurance.  Patient was to follow-up a few weeks after our last office visit November however stated that she thought she did not need to come in because she has not started physical therapy.  Since then she has been doing home exercises and taking Tylenol as needed with little improvement.  She reports pain in the low back with intermittent shooting pain to the left lower extremity.  She also reports occasional tingling in the left foot. MEB    Back Pain  Pertinent negatives include no numbness.       Constitution: Positive for activity change.   Musculoskeletal:  Positive for pain and back pain. Negative for muscle cramps and muscle ache.   Neurological:  Positive for tingling. Negative for numbness.   Psychiatric/Behavioral:  Negative for sleep disturbance.      Objective:     Physical Exam  Vitals and nursing note reviewed.   Constitutional:       General: She is not in acute distress.     Appearance: Normal appearance. She is well-developed.   HENT:      Head: Normocephalic and atraumatic.      Right Ear: Hearing and external ear normal.      Left Ear: Hearing and external ear normal.      Nose: Nose normal. No nasal deformity.   Eyes:      General: Lids are normal.      Conjunctiva/sclera: Conjunctivae normal.      Right eye: Right conjunctiva is not injected.      Left eye: Left conjunctiva is not injected.   Neck:      Trachea: Trachea normal.    Cardiovascular:      Pulses: Normal pulses.           Dorsalis pedis pulses are 2+ on the right side and 2+ on the left side.        Posterior tibial pulses are 2+ on the right side and 2+ on the left side.   Pulmonary:      Effort: Pulmonary effort is normal. No respiratory distress.      Breath sounds: No stridor.   Musculoskeletal:      Cervical back: Normal and normal range of motion. No spinous process tenderness or muscular tenderness.      Thoracic back: Normal.      Lumbar back: Tenderness present. No deformity. Decreased range of motion (Flexion to 45° at the waist). Positive left straight leg raise test. Negative right straight leg raise test.        Back:    Skin:     General: Skin is warm and dry.      Findings: No abrasion or bruising.   Neurological:      General: No focal deficit present.      Mental Status: She is alert.   Psychiatric:         Attention and Perception: She is attentive.         Speech: Speech normal.         Behavior: Behavior normal.         Thought Content: Thought content normal.        Assessment:      1. Strain of lumbar region, subsequent encounter    2. Annular tear of lumbar disc    3. Work related injury      Plan:       I will reorder physical therapy and coordinate with C to ensure authorization.  Advised patient to follow-up regardless of whether or not she has started physical therapy.  Continue warm soaks and home stretches.  Patient to start meloxicam for anti-inflammatory.  Continue restricted duty.  Patient to return to clinic in 1 month or sooner as needed.      Medications Ordered This Encounter   Medications    meloxicam (MOBIC) 15 MG tablet     Sig: Take 1 tablet (15 mg total) by mouth once daily.     Dispense:  30 tablet     Refill:  0     Patient Instructions: Attention not to aggravate affected area, Daily home exercises/warm soaks, PT to be scheduled once authorized   Restrictions:  (See CA-17)  Follow up in about 4 weeks (around 2/7/2024) for  Reassessment.

## 2024-01-11 ENCOUNTER — TELEPHONE (OUTPATIENT)
Dept: PRIMARY CARE CLINIC | Facility: CLINIC | Age: 59
End: 2024-01-11
Payer: COMMERCIAL

## 2024-01-11 NOTE — TELEPHONE ENCOUNTER
----- Message from Elida Monterroso sent at 1/11/2024 12:55 PM CST -----  Contact: JUDITH [phone]  PT calling to speak with the nurse regarding her vitamin D compound. Please call to advise.

## 2024-02-14 ENCOUNTER — PATIENT MESSAGE (OUTPATIENT)
Dept: PRIMARY CARE CLINIC | Facility: CLINIC | Age: 59
End: 2024-02-14
Payer: COMMERCIAL

## 2024-02-14 ENCOUNTER — OFFICE VISIT (OUTPATIENT)
Dept: URGENT CARE | Facility: CLINIC | Age: 59
End: 2024-02-14
Payer: OTHER GOVERNMENT

## 2024-02-14 VITALS
BODY MASS INDEX: 27.07 KG/M2 | WEIGHT: 152.75 LBS | HEIGHT: 63 IN | HEART RATE: 69 BPM | TEMPERATURE: 98 F | OXYGEN SATURATION: 98 % | SYSTOLIC BLOOD PRESSURE: 152 MMHG | RESPIRATION RATE: 18 BRPM | DIASTOLIC BLOOD PRESSURE: 101 MMHG
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
## 2024-02-14 DIAGNOSIS — Z02.6 ENCOUNTER RELATED TO WORKER'S COMPENSATION CLAIM: ICD-10-CM

## 2024-02-14 DIAGNOSIS — M51.36 ANNULAR TEAR OF LUMBAR DISC: ICD-10-CM

## 2024-02-14 DIAGNOSIS — S39.012D STRAIN OF LUMBAR REGION, SUBSEQUENT ENCOUNTER: ICD-10-CM

## 2024-02-14 DIAGNOSIS — G89.29 CHRONIC LEFT-SIDED LOW BACK PAIN WITH LEFT-SIDED SCIATICA: Primary | ICD-10-CM

## 2024-02-14 DIAGNOSIS — M54.42 CHRONIC LEFT-SIDED LOW BACK PAIN WITH LEFT-SIDED SCIATICA: Primary | ICD-10-CM

## 2024-02-14 PROCEDURE — 99213 OFFICE O/P EST LOW 20 MIN: CPT | Mod: S$GLB,,, | Performed by: PHYSICIAN ASSISTANT

## 2024-02-14 RX ORDER — GABAPENTIN 300 MG/1
CAPSULE ORAL
Qty: 90 CAPSULE | Refills: 0 | Status: SHIPPED | OUTPATIENT
Start: 2024-02-14 | End: 2024-03-20

## 2024-02-14 RX ORDER — ACETAMINOPHEN 500 MG
1000 TABLET ORAL EVERY 6 HOURS PRN
Qty: 60 TABLET | Refills: 0 | Status: SHIPPED | OUTPATIENT
Start: 2024-02-14 | End: 2024-03-20 | Stop reason: SDUPTHER

## 2024-02-14 NOTE — LETTER
Urgent Care - [address] [address]  Phone: [phone]  Fax: [phone]  [facility]: [id number]     Name: Mary Alice Garcia  Injury Date: 08/01/2012   Employee ID: [id number] Date of Treatment: 02/14/2024   Company: [employer]      Appointment Time: 09:45 AM Arrived: 9:45 AM   Provider: Garrick Hernandez PA-C Time Out: 11:20 AM     Office Treatment:   1. Chronic left-sided low back pain with left-sided sciatica    2. Encounter related to worker's compensation claim    3. Annular tear of lumbar disc    4. Strain of lumbar region, subsequent encounter    5. Work related injury      Medications Ordered This Encounter   Medications    acetaminophen (TYLENOL EXTRA STRENGTH) 500 MG tablet    gabapentin (NEURONTIN) 300 MG capsule      Patient Instructions: Attention not to aggravate affected area, Daily home exercises/warm soaks, Referral to specialist to be scheduled, once authorized      Restrictions:  (See CA-17)     Return Appointment: 2/28/2024 at 10:30 AM    TABATHA

## 2024-02-14 NOTE — PROGRESS NOTES
Subjective:      Patient ID: Mary Alice Garcia is a 58 y.o. female.    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  She reports no significant improvement since last office visit.  Physical therapy has been denied twice.  Patient has been taking meloxicam and Tylenol with little to no relief.  Patient has had a history of low back pain since 2021.  She says her back pain is much worse since her work-related injury on 08/19/2023.  Patient notes that she has had epidural steroid injections in the past with temporary relief.  She says it only lasted a few weeks and then the pain returned.  She is currently working light duty.  She is on permanent light duty restrictions due to her initial back injury in 2021. MEB     Back Pain  Pertinent negatives include no numbness.     Constitution: Positive for activity change.   Musculoskeletal:  Positive for pain and back pain.   Neurological:  Negative for numbness and tingling.     Objective:     Physical Exam  Vitals and nursing note reviewed.   Constitutional:       General: She is not in acute distress.     Appearance: Normal appearance. She is well-developed.   HENT:      Head: Normocephalic and atraumatic.      Right Ear: Hearing and external ear normal.      Left Ear: Hearing and external ear normal.      Nose: Nose normal. No nasal deformity.   Eyes:      General: Lids are normal.      Conjunctiva/sclera: Conjunctivae normal.      Right eye: Right conjunctiva is not injected.      Left eye: Left conjunctiva is not injected.   Neck:      Trachea: Trachea normal.   Cardiovascular:      Pulses:  Normal pulses.           Dorsalis pedis pulses are 2+ on the right side and 2+ on the left side.        Posterior tibial pulses are 2+ on the right side and 2+ on the left side.   Pulmonary:      Effort: Pulmonary effort is normal. No respiratory distress.      Breath sounds: No stridor.   Musculoskeletal:      Cervical back: Normal and normal range of motion. No spinous process tenderness or muscular tenderness.      Thoracic back: Normal.      Lumbar back: Tenderness present. No deformity. Decreased range of motion (Flexion to 45° at the waist). Positive left straight leg raise test. Negative right straight leg raise test.        Back:    Skin:     General: Skin is warm and dry.      Findings: No abrasion or bruising.   Neurological:      General: No focal deficit present.      Mental Status: She is alert.   Psychiatric:         Attention and Perception: She is attentive.         Speech: Speech normal.         Behavior: Behavior normal.         Thought Content: Thought content normal.        Assessment:      1. Chronic left-sided low back pain with left-sided sciatica    2. Encounter related to worker's compensation claim    3. Annular tear of lumbar disc    4. Strain of lumbar region, subsequent encounter    5. Work related injury      Plan:     I will place a referral for pain management.  In the meantime I will initiate gabapentin as she has had this in the past with some relief.  Patient to continue Tylenol as needed.  Stop all NSAIDs as she has been on this chronically and I do not want to cause GI ulcer or kidney problems.  NSAIDs have not helped much in the past anyway.   I will contact her  to find out why referrals are being denied.       Medications Ordered This Encounter   Medications    acetaminophen (TYLENOL EXTRA STRENGTH) 500 MG tablet     Sig: Take 2 tablets (1,000 mg total) by mouth every 6 (six) hours as needed for Pain.     Dispense:  60 tablet     Refill:  0    gabapentin (NEURONTIN)  300 MG capsule     Sig: Take 1 capsule (300 mg total) by mouth every evening for 1 day, THEN 1 capsule (300 mg total) 2 (two) times daily for 1 day, THEN 1 capsule (300 mg total) 3 (three) times daily.     Dispense:  90 capsule     Refill:  0     Patient Instructions: Attention not to aggravate affected area, Daily home exercises/warm soaks, Referral to specialist to be scheduled, once authorized   Restrictions:  (See CA-17)  Follow up in about 2 weeks (around 2/28/2024) for Reassessment.

## 2024-02-21 ENCOUNTER — TELEPHONE (OUTPATIENT)
Dept: ORTHOPEDICS | Facility: CLINIC | Age: 59
End: 2024-02-21
Payer: COMMERCIAL

## 2024-02-26 ENCOUNTER — OFFICE VISIT (OUTPATIENT)
Dept: INTERNAL MEDICINE | Facility: CLINIC | Age: 59
End: 2024-02-26
Payer: COMMERCIAL

## 2024-02-26 VITALS
HEART RATE: 78 BPM | HEIGHT: 64 IN | SYSTOLIC BLOOD PRESSURE: 126 MMHG | BODY MASS INDEX: 26.39 KG/M2 | WEIGHT: 154.56 LBS | OXYGEN SATURATION: 100 % | DIASTOLIC BLOOD PRESSURE: 90 MMHG

## 2024-02-26 DIAGNOSIS — J01.10 ACUTE NON-RECURRENT FRONTAL SINUSITIS: Primary | ICD-10-CM

## 2024-02-26 DIAGNOSIS — E66.3 OVERWEIGHT (BMI 25.0-29.9): ICD-10-CM

## 2024-02-26 DIAGNOSIS — R05.8 OTHER COUGH: ICD-10-CM

## 2024-02-26 PROBLEM — N93.9 ABNORMAL UTERINE BLEEDING: Status: ACTIVE | Noted: 2020-01-19

## 2024-02-26 LAB
CTP QC/QA: YES
SARS-COV-2 RDRP RESP QL NAA+PROBE: NEGATIVE

## 2024-02-26 PROCEDURE — 99999 PR PBB SHADOW E&M-EST. PATIENT-LVL IV: CPT | Mod: PBBFAC,,, | Performed by: NURSE PRACTITIONER

## 2024-02-26 PROCEDURE — 99214 OFFICE O/P EST MOD 30 MIN: CPT | Mod: S$GLB,,, | Performed by: NURSE PRACTITIONER

## 2024-02-26 PROCEDURE — 87635 SARS-COV-2 COVID-19 AMP PRB: CPT | Mod: QW,S$GLB,, | Performed by: NURSE PRACTITIONER

## 2024-02-26 PROCEDURE — 3080F DIAST BP >= 90 MM HG: CPT | Mod: CPTII,S$GLB,, | Performed by: NURSE PRACTITIONER

## 2024-02-26 PROCEDURE — 3074F SYST BP LT 130 MM HG: CPT | Mod: CPTII,S$GLB,, | Performed by: NURSE PRACTITIONER

## 2024-02-26 PROCEDURE — 1159F MED LIST DOCD IN RCRD: CPT | Mod: CPTII,S$GLB,, | Performed by: NURSE PRACTITIONER

## 2024-02-26 PROCEDURE — 3008F BODY MASS INDEX DOCD: CPT | Mod: CPTII,S$GLB,, | Performed by: NURSE PRACTITIONER

## 2024-02-26 RX ORDER — BENZONATATE 100 MG/1
100 CAPSULE ORAL 3 TIMES DAILY PRN
Qty: 30 CAPSULE | Refills: 0 | Status: SHIPPED | OUTPATIENT
Start: 2024-02-26 | End: 2024-03-20

## 2024-02-26 RX ORDER — AZITHROMYCIN 250 MG/1
TABLET, FILM COATED ORAL
Qty: 6 TABLET | Refills: 0 | Status: SHIPPED | OUTPATIENT
Start: 2024-02-26 | End: 2024-03-02

## 2024-02-26 NOTE — PROGRESS NOTES
"Subjective     Patient ID: Mary Alice Garcia is a 58 y.o. female.    Chief Complaint: Headache, Nasal Congestion, Sore Throat (Pt states she also have body aches.  Also would like bloodwork to check thyroid level), and Fatigue    Pt of Kelley Badillo NP, new to me, here for, "sore throat headache congestion watery eyes."      Sore Throat   This is a new problem. The current episode started in the past 7 days. The problem has been unchanged. Neither side of throat is experiencing more pain than the other. There has been no fever. The pain is at a severity of 8/10. The pain is moderate. Associated symptoms include congestion, coughing and headaches. Pertinent negatives include no abdominal pain, diarrhea, drooling, ear discharge, ear pain, hoarse voice, plugged ear sensation, neck pain, shortness of breath, stridor, swollen glands, trouble swallowing or vomiting. She has had no exposure to strep or mono. 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 She has tried acetaminophen (OTC Afrin nasal spray, AlkaSeltzer, Saline nasal spray) for the symptoms. The treatment provided no relief.     Review of Systems   Constitutional:  Positive for chills. Negative for fatigue and fever.   HENT:  Positive for nasal congestion, postnasal drip, rhinorrhea, sinus pressure/congestion and sore throat. Negative for drooling, ear discharge, ear pain, hoarse voice and trouble swallowing.    Eyes:  Positive for itching.   Respiratory:  Positive for cough. Negative for shortness of breath and stridor.    Cardiovascular:  Negative for chest pain, palpitations, leg swelling and claudication.   Gastrointestinal:  Negative for abdominal pain, diarrhea, nausea and vomiting.   Musculoskeletal:  Negative for neck pain.   Allergic/Immunologic: Positive for environmental allergies. Negative for food allergies and immunocompromised state.   Neurological:  Positive for headaches. Negative for facial asymmetry, weakness, light-headedness and numbness. "   Hematological:  Negative for adenopathy. Does not bruise/bleed easily.   Psychiatric/Behavioral:  Negative for suicidal ideas.             Review of patient's allergies indicates:   Allergen Reactions    Paroxetine hcl Rash and Nausea And Vomiting         Current Outpatient Medications:     acetaminophen (TYLENOL EXTRA STRENGTH) 500 MG tablet, Take 2 tablets (1,000 mg total) by mouth every 6 (six) hours as needed for Pain., Disp: 60 tablet, Rfl: 0    calcium carbonate (OS-FLO) 500 mg calcium (1,250 mg) tablet, Take 2 tablets by mouth 2 (two) times daily.  , Disp: , Rfl:     fish oil-omega-3 fatty acids 300-1,000 mg capsule, Take 2 g by mouth once daily., Disp: , Rfl:     gabapentin (NEURONTIN) 300 MG capsule, Take 1 capsule (300 mg total) by mouth every evening for 1 day, THEN 1 capsule (300 mg total) 2 (two) times daily for 1 day, THEN 1 capsule (300 mg total) 3 (three) times daily., Disp: 90 capsule, Rfl: 0    meloxicam (MOBIC) 15 MG tablet, Take 1 tablet (15 mg total) by mouth once daily., Disp: 30 tablet, Rfl: 0    propranoloL (INDERAL) 60 MG tablet, Take 1 tablet (60 mg total) by mouth 2 (two) times daily., Disp: 180 tablet, Rfl: 3    Patient Active Problem List   Diagnosis    Hypothyroid    GERD (gastroesophageal reflux disease)    Traumatic incomplete tear of left rotator cuff    Overweight (BMI 25.0-29.9)    Essential hypertension    Palpitations    Chest pain of uncertain etiology    Dyspnea on exertion    Sinus tachycardia    Family history of breast cancer in first degree relative    Menopausal symptoms    Pap smear of cervix shows high risk HPV present, type 18    Hearing loss of right ear due to cerumen impaction    Upper respiratory tract infection    Abnormal uterine bleeding       Past Medical History:   Diagnosis Date    Arthritis     Depression     Hyperlipidemia     Hypertension     2013    Syncope and collapse     Thyroid disease     Tuberculosis        Past Surgical History:   Procedure  "Laterality Date    BUNIONECTOMY Left      SECTION      COLONOSCOPY N/A 10/25/2023    Procedure: COLONOSCOPY;  Surgeon: Gadiel De MD;  Location: [facility];  Service: Endoscopy;  Laterality: N/A;    ESOPHAGOGASTRODUODENOSCOPY  2020    ESOPHAGOGASTRODUODENOSCOPY N/A 2020    Procedure: EGD (ESOPHAGOGASTRODUODENOSCOPY);  Surgeon: Rio Galeano MD;  Location: [facility] ENDO;  Service: Endoscopy;  Laterality: N/A;    ESOPHAGOGASTRODUODENOSCOPY N/A 10/25/2023    Procedure: EGD (ESOPHAGOGASTRODUODENOSCOPY);  Surgeon: Gadiel De MD;  Location: [facility];  Service: Endoscopy;  Laterality: N/A;    HEMORRHOID SURGERY      KNEE SURGERY      right knee    thyroid removed      TONSILLECTOMY      TUBAL LIGATION         Social History     Socioeconomic History    Marital status:    Tobacco Use    Smoking status: Never     Passive exposure: Never    Smokeless tobacco: Never   Substance and Sexual Activity    Alcohol use: Yes     Alcohol/week: 8.0 standard drinks of alcohol     Types: 3 Glasses of wine, 5 Cans of beer per week    Drug use: No    Sexual activity: Not Currently     Partners: Male     Birth control/protection: Condom, See Surgical Hx       Family History   Problem Relation Age of Onset    Breast cancer Mother     Cancer Mother         breast cancer    Hypertension Mother     Hyperlipidemia Father     Breast cancer Sister     Hypertension Brother     Diabetes Maternal Grandmother     Diabetes Paternal Grandmother     Cancer Maternal Aunt     Drug abuse Maternal Aunt        Objective     Vitals:    24 1123   BP: (!) 126/90   Pulse: 78   SpO2: 100%   Weight: 70.1 kg (154 lb 8.7 oz)   Height: 5' 3.5" (1.613 m)   PainSc:   8       Body mass index is 26.95 kg/m².    Physical Exam  Vitals and nursing note reviewed.   HENT:      Head: Normocephalic.      Right Ear: Tympanic membrane, ear canal and external ear normal. There is no impacted cerumen.      Left Ear: " Tympanic membrane, ear canal and external ear normal. There is impacted cerumen.      Nose: Mucosal edema, congestion and rhinorrhea present.      Right Sinus: Frontal sinus tenderness present.      Left Sinus: Frontal sinus tenderness present.      Mouth/Throat:      Mouth: Mucous membranes are moist.      Pharynx: Oropharynx is clear. No oropharyngeal exudate or posterior oropharyngeal erythema.   Eyes:      Conjunctiva/sclera: Conjunctivae normal.   Cardiovascular:      Rate and Rhythm: Normal rate and regular rhythm.      Pulses: Normal pulses.      Heart sounds: Normal heart sounds. No murmur heard.     No gallop.   Pulmonary:      Effort: Pulmonary effort is normal.      Breath sounds: Normal breath sounds.   Musculoskeletal:         General: Normal range of motion.      Cervical back: Normal range of motion and neck supple.   Lymphadenopathy:      Cervical: No cervical adenopathy.   Skin:     General: Skin is warm and dry.   Neurological:      General: No focal deficit present.      Mental Status: She is alert and oriented to person, place, and time.   Psychiatric:         Mood and Affect: Mood normal.         Behavior: Behavior normal.         Thought Content: Thought content normal.         Judgment: Judgment normal.            Assessment and Plan     1. Acute non-recurrent frontal sinusitis  -     POCT COVID-19 Rapid Screening  -     azithromycin (Z-EVELIA) 250 MG tablet; Take 2 tablets by mouth on day 1; Take 1 tablet by mouth on days 2-5  Dispense: 6 tablet; Refill: 0    2. Other cough  -     benzonatate (TESSALON) 100 MG capsule; Take 1 capsule (100 mg total) by mouth 3 (three) times daily as needed for Cough.  Dispense: 30 capsule; Refill: 0    3. BMI 26.0-26.9,adult  BMI reviewed    4. Overweight (BMI 25.0-29.9)  BMI reviewed.    Diet and exercise to lose weight.    COVID test negative    Meds as prescribed    Rest and Fluids, Tylenol or Motrin otc as needed for pain and or fever    Warm liquids to thin  mucus    Allergen avoidance discussed, humidified air recommended    Warm salt water gargles as needed for throat pain    Nasal spray, taught how to correctly use    Work note given    Self care instructions provided in AVS               Follow up if symptoms worsen or fail to improve.

## 2024-02-26 NOTE — PATIENT INSTRUCTIONS
COVID test negative    Meds as prescribed    Rest and Fluids, Tylenol or Motrin otc as needed for pain and or fever    Warm liquids to thin mucus    Allergen avoidance discussed, humidified air recommended    Warm salt water gargles as needed for throat pain    Nasal spray, taught how to correctly use    Work note given

## 2024-02-26 NOTE — LETTER
February 26, 2024      [facility]  [address]  [facility] [address]  Phone: [phone]  Fax: [phone]       Patient: Mary Alice Garcia   YOB: 1965  Date of Visit: 02/26/2024    To Whom It May Concern:    Anuel Garcia  was at [facility] on 02/26/2024. The patient may return to work/school on 2-29-24 with no restrictions. If you have any questions or concerns, or if I can be of further assistance, please do not hesitate to contact me.    Sincerely,    Irene Torres DNP

## 2024-02-28 ENCOUNTER — OFFICE VISIT (OUTPATIENT)
Dept: URGENT CARE | Facility: CLINIC | Age: 59
End: 2024-02-28
Payer: OTHER GOVERNMENT

## 2024-02-28 VITALS
DIASTOLIC BLOOD PRESSURE: 90 MMHG | HEIGHT: 63 IN | WEIGHT: 154 LBS | HEART RATE: 65 BPM | SYSTOLIC BLOOD PRESSURE: 119 MMHG | OXYGEN SATURATION: 100 % | RESPIRATION RATE: 18 BRPM | TEMPERATURE: 98 F | BODY MASS INDEX: 27.29 KG/M2

## 2024-02-28 DIAGNOSIS — M54.42 CHRONIC LEFT-SIDED LOW BACK PAIN WITH LEFT-SIDED SCIATICA: ICD-10-CM

## 2024-02-28 DIAGNOSIS — S39.012D STRAIN OF LUMBAR REGION, SUBSEQUENT ENCOUNTER: Primary | ICD-10-CM

## 2024-02-28 DIAGNOSIS — M51.36 ANNULAR TEAR OF LUMBAR DISC: ICD-10-CM
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
## 2024-02-28 DIAGNOSIS — G89.29 CHRONIC LEFT-SIDED LOW BACK PAIN WITH LEFT-SIDED SCIATICA: ICD-10-CM

## 2024-02-28 PROCEDURE — 99213 OFFICE O/P EST LOW 20 MIN: CPT | Mod: S$GLB,,, | Performed by: PHYSICIAN ASSISTANT

## 2024-02-28 NOTE — PROGRESS NOTES
Subjective:      Patient ID: Mary Alice Garcia is a 58 y.o. female.    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 She reports nos significant improvement since last office visit.  Patient has had a history of low back pain since 2021.  She says her back pain is much worse since her work-related injury on 08/19/2023. She reports pain in the left low back with occasional radiation to LLE. Her case has finally been approved by [employer]. She has an appointment this afternoon with pain management.   Last office visit I prescribed gabapentin which she states improved her pain. Unfortunately she had a respiratory illness recently and was told to stop most of her medications by treating physician. She is on antibiotics and cough suppressant currently. She has not had gabapentin for over a week. MEB    Back Pain  This is a new problem. The current episode started more than 1 month ago. The problem occurs constantly. The quality of the pain is described as stabbing (throbbing). The pain radiates to the left foot and left thigh. The pain is at a severity of 6/10. The pain is moderate. The pain is Worse during the day. The symptoms are aggravated by bending. Stiffness is present At night. Pertinent negatives include no numbness.       Constitution: Positive for activity change.   Musculoskeletal:  Positive for pain and back pain.   Neurological:  Negative for numbness and tingling.     Objective:     Physical Exam  Vitals and nursing note reviewed.   Constitutional:       General: She is not in acute distress.     Appearance: Normal appearance. She is well-developed.   HENT:      Head: Normocephalic and atraumatic.      Right Ear: Hearing and  external ear normal.      Left Ear: Hearing and external ear normal.      Nose: Nose normal. No nasal deformity.   Eyes:      General: Lids are normal.      Conjunctiva/sclera: Conjunctivae normal.      Right eye: Right conjunctiva is not injected.      Left eye: Left conjunctiva is not injected.   Neck:      Trachea: Trachea normal.   Pulmonary:      Effort: Pulmonary effort is normal. No respiratory distress.      Breath sounds: No stridor.   Musculoskeletal:      Cervical back: Normal and normal range of motion. No spinous process tenderness or muscular tenderness.      Thoracic back: Normal.      Lumbar back: Tenderness present. No deformity. Decreased range of motion (Flexion to 60° at the waist). Positive left straight leg raise test. Negative right straight leg raise test.        Back:       Comments: TINO positive on the left   Skin:     General: Skin is warm and dry.      Findings: No abrasion or bruising.   Neurological:      General: No focal deficit present.      Mental Status: She is alert.   Psychiatric:         Attention and Perception: She is attentive.         Speech: Speech normal.         Behavior: Behavior normal.         Thought Content: Thought content normal.        Assessment:      1. Strain of lumbar region, subsequent encounter    2. Annular tear of lumbar disc    3. Chronic left-sided low back pain with left-sided sciatica    4. Work related injury      Plan:     It is with a high degree of medical certainty that this patient's current signs and symptoms were caused or exacerbated by the work related injury mentioned in the note above    Patient with chronic low back pain.  She is not at her baseline prior to her injury in August. [employer] finally accepted the case after 6 months.  Patient has appointment with pain management today for initial visit.          Patient Instructions: Attention not to aggravate affected area, Daily home exercises/warm soaks   Restrictions:  (See CA-17)  Follow up  in about 3 weeks (around 3/20/2024).

## 2024-02-28 NOTE — LETTER
Urgent Care - [address] [address]  Phone: [phone]  Fax: [phone]  [facility]: [id number]    Pt Name: Mary Alice Garcia  Injury Date: 08/19/2023   Employee ID: [id number] Date of Treatment: 02/28/2024   Company: [email or web address]      Appointment Time: 10:30 AM Arrived: 09:30 AM   Provider: Garrick Hernandez PA-C Time Out:11:08 AM     Office Treatment:   1. Strain of lumbar region, subsequent encounter    2. Annular tear of lumbar disc    3. Chronic left-sided low back pain with left-sided sciatica    4. Work related injury          Patient Instructions: Attention not to aggravate affected area, Daily home exercises/warm soaks    Restrictions:  (See CA-17)     Return Appointment: 3/20/2024 at 08:30 AM  SB

## 2024-03-07 ENCOUNTER — PATIENT MESSAGE (OUTPATIENT)
Dept: INTERNAL MEDICINE | Facility: CLINIC | Age: 59
End: 2024-03-07
Payer: COMMERCIAL

## 2024-03-13 ENCOUNTER — OFFICE VISIT (OUTPATIENT)
Dept: PRIMARY CARE CLINIC | Facility: CLINIC | Age: 59
End: 2024-03-13
Payer: COMMERCIAL

## 2024-03-13 ENCOUNTER — LAB VISIT (OUTPATIENT)
Dept: LAB | Facility: HOSPITAL | Age: 59
End: 2024-03-13
Attending: NURSE PRACTITIONER
Payer: COMMERCIAL

## 2024-03-13 ENCOUNTER — TELEPHONE (OUTPATIENT)
Dept: INTERNAL MEDICINE | Facility: CLINIC | Age: 59
End: 2024-03-13
Payer: COMMERCIAL

## 2024-03-13 VITALS
HEART RATE: 86 BPM | RESPIRATION RATE: 16 BRPM | HEIGHT: 63 IN | SYSTOLIC BLOOD PRESSURE: 116 MMHG | BODY MASS INDEX: 27.81 KG/M2 | DIASTOLIC BLOOD PRESSURE: 80 MMHG | OXYGEN SATURATION: 98 % | WEIGHT: 156.94 LBS

## 2024-03-13 DIAGNOSIS — F33.1 MODERATE EPISODE OF RECURRENT MAJOR DEPRESSIVE DISORDER: ICD-10-CM

## 2024-03-13 DIAGNOSIS — I10 ESSENTIAL HYPERTENSION: Primary | ICD-10-CM

## 2024-03-13 DIAGNOSIS — K21.9 GASTROESOPHAGEAL REFLUX DISEASE WITHOUT ESOPHAGITIS: ICD-10-CM

## 2024-03-13 DIAGNOSIS — G43.809 OTHER MIGRAINE WITHOUT STATUS MIGRAINOSUS, NOT INTRACTABLE: ICD-10-CM

## 2024-03-13 DIAGNOSIS — I10 ESSENTIAL HYPERTENSION: ICD-10-CM

## 2024-03-13 DIAGNOSIS — E03.9 ACQUIRED HYPOTHYROIDISM: ICD-10-CM

## 2024-03-13 LAB
ALBUMIN SERPL BCP-MCNC: 3.8 G/DL (ref 3.5–5.2)
ALP SERPL-CCNC: 73 U/L (ref 55–135)
ALT SERPL W/O P-5'-P-CCNC: 29 U/L (ref 10–44)
ANION GAP SERPL CALC-SCNC: 9 MMOL/L (ref 8–16)
AST SERPL-CCNC: 33 U/L (ref 10–40)
BASOPHILS # BLD AUTO: 0.06 K/UL (ref 0–0.2)
BASOPHILS NFR BLD: 0.9 % (ref 0–1.9)
BILIRUB SERPL-MCNC: 0.5 MG/DL (ref 0.1–1)
BUN SERPL-MCNC: 7 MG/DL (ref 6–20)
CALCIUM SERPL-MCNC: 9.8 MG/DL (ref 8.7–10.5)
CHLORIDE SERPL-SCNC: 105 MMOL/L (ref 95–110)
CO2 SERPL-SCNC: 26 MMOL/L (ref 23–29)
CREAT SERPL-MCNC: 0.7 MG/DL (ref 0.5–1.4)
DIFFERENTIAL METHOD BLD: ABNORMAL
EOSINOPHIL # BLD AUTO: 0.2 K/UL (ref 0–0.5)
EOSINOPHIL NFR BLD: 3.4 % (ref 0–8)
ERYTHROCYTE [DISTWIDTH] IN BLOOD BY AUTOMATED COUNT: 12.3 % (ref 11.5–14.5)
EST. GFR  (NO RACE VARIABLE): >60 ML/MIN/1.73 M^2
ESTIMATED AVG GLUCOSE: 117 MG/DL (ref 68–131)
GLUCOSE SERPL-MCNC: 126 MG/DL (ref 70–110)
HBA1C MFR BLD: 5.7 % (ref 4–5.6)
HCT VFR BLD AUTO: 41.1 % (ref 37–48.5)
HGB BLD-MCNC: 13.7 G/DL (ref 12–16)
IMM GRANULOCYTES # BLD AUTO: 0.01 K/UL (ref 0–0.04)
IMM GRANULOCYTES NFR BLD AUTO: 0.1 % (ref 0–0.5)
LYMPHOCYTES # BLD AUTO: 3.7 K/UL (ref 1–4.8)
LYMPHOCYTES NFR BLD: 53.5 % (ref 18–48)
MCH RBC QN AUTO: 29.5 PG (ref 27–31)
MCHC RBC AUTO-ENTMCNC: 33.3 G/DL (ref 32–36)
MCV RBC AUTO: 88 FL (ref 82–98)
MONOCYTES # BLD AUTO: 0.4 K/UL (ref 0.3–1)
MONOCYTES NFR BLD: 6.5 % (ref 4–15)
NEUTROPHILS # BLD AUTO: 2.4 K/UL (ref 1.8–7.7)
NEUTROPHILS NFR BLD: 35.6 % (ref 38–73)
NRBC BLD-RTO: 0 /100 WBC
PLATELET # BLD AUTO: 294 K/UL (ref 150–450)
PMV BLD AUTO: 11.3 FL (ref 9.2–12.9)
POTASSIUM SERPL-SCNC: 4.4 MMOL/L (ref 3.5–5.1)
PROT SERPL-MCNC: 8 G/DL (ref 6–8.4)
RBC # BLD AUTO: 4.65 M/UL (ref 4–5.4)
SODIUM SERPL-SCNC: 140 MMOL/L (ref 136–145)
T4 FREE SERPL-MCNC: 0.96 NG/DL (ref 0.71–1.51)
TSH SERPL DL<=0.005 MIU/L-ACNC: 0.42 UIU/ML (ref 0.4–4)
WBC # BLD AUTO: 6.82 K/UL (ref 3.9–12.7)

## 2024-03-13 PROCEDURE — 83036 HEMOGLOBIN GLYCOSYLATED A1C: CPT | Performed by: NURSE PRACTITIONER

## 2024-03-13 PROCEDURE — 3079F DIAST BP 80-89 MM HG: CPT | Mod: CPTII,S$GLB,, | Performed by: NURSE PRACTITIONER

## 2024-03-13 PROCEDURE — 99999 PR PBB SHADOW E&M-EST. PATIENT-LVL IV: CPT | Mod: PBBFAC,,, | Performed by: NURSE PRACTITIONER

## 2024-03-13 PROCEDURE — 84439 ASSAY OF FREE THYROXINE: CPT | Performed by: NURSE PRACTITIONER

## 2024-03-13 PROCEDURE — 99214 OFFICE O/P EST MOD 30 MIN: CPT | Mod: S$GLB,,, | Performed by: NURSE PRACTITIONER

## 2024-03-13 PROCEDURE — 80053 COMPREHEN METABOLIC PANEL: CPT | Performed by: NURSE PRACTITIONER

## 2024-03-13 PROCEDURE — 3008F BODY MASS INDEX DOCD: CPT | Mod: CPTII,S$GLB,, | Performed by: NURSE PRACTITIONER

## 2024-03-13 PROCEDURE — 1160F RVW MEDS BY RX/DR IN RCRD: CPT | Mod: CPTII,S$GLB,, | Performed by: NURSE PRACTITIONER

## 2024-03-13 PROCEDURE — 3074F SYST BP LT 130 MM HG: CPT | Mod: CPTII,S$GLB,, | Performed by: NURSE PRACTITIONER

## 2024-03-13 PROCEDURE — 84443 ASSAY THYROID STIM HORMONE: CPT | Performed by: NURSE PRACTITIONER

## 2024-03-13 PROCEDURE — 3044F HG A1C LEVEL LT 7.0%: CPT | Mod: CPTII,S$GLB,, | Performed by: NURSE PRACTITIONER

## 2024-03-13 PROCEDURE — 1159F MED LIST DOCD IN RCRD: CPT | Mod: CPTII,S$GLB,, | Performed by: NURSE PRACTITIONER

## 2024-03-13 PROCEDURE — 36415 COLL VENOUS BLD VENIPUNCTURE: CPT | Performed by: NURSE PRACTITIONER

## 2024-03-13 PROCEDURE — 85025 COMPLETE CBC W/AUTO DIFF WBC: CPT | Performed by: NURSE PRACTITIONER

## 2024-03-13 RX ORDER — BUPROPION HYDROCHLORIDE 150 MG/1
150 TABLET ORAL DAILY
Qty: 30 TABLET | Refills: 11 | Status: SHIPPED | OUTPATIENT
Start: 2024-03-13 | End: 2025-03-13

## 2024-03-13 RX ORDER — LEVOTHYROXINE SODIUM 137 UG/1
137 TABLET ORAL
COMMUNITY
Start: 2024-02-29

## 2024-03-13 NOTE — PROGRESS NOTES
[facility] Note    Chief Complaint      Chief Complaint   Patient presents with    Follow-up     Need blood work done     History of Present Illness      Mary Alice Garcia is a 58 y.o. female patient with chronic conditions of hearing loss, palpitations, hypertension, hypothyroid, GERD, chronic right knee pain who presents today for follow-up.    Still looking to find a pharmacy that will compound:  Vitamin D3 41038 IU, calcium phosphate try basic 1200 mg, zinc acetate 50 mg      Health Maintenance   Topic Date Due    TETANUS VACCINE  2024 (Originally 1983)    Shingles Vaccine (1 of 2) 2024 (Originally 2017)    Mammogram  2024    Lipid Panel  2028    Colorectal Cancer Screening  10/25/2033    Hepatitis C Screening  Completed       Past Medical History:   Diagnosis Date    Arthritis     Depression     Hyperlipidemia     Hypertension     2013    Syncope and collapse     Thyroid disease     Tuberculosis        Past Surgical History:   Procedure Laterality Date    BUNIONECTOMY Left      SECTION      COLONOSCOPY N/A 10/25/2023    Procedure: COLONOSCOPY;  Surgeon: Gadiel De MD;  Location: [facility];  Service: Endoscopy;  Laterality: N/A;    ESOPHAGOGASTRODUODENOSCOPY  2020    ESOPHAGOGASTRODUODENOSCOPY N/A 2020    Procedure: EGD (ESOPHAGOGASTRODUODENOSCOPY);  Surgeon: Rio Galeano MD;  Location: [facility];  Service: Endoscopy;  Laterality: N/A;    ESOPHAGOGASTRODUODENOSCOPY N/A 10/25/2023    Procedure: EGD (ESOPHAGOGASTRODUODENOSCOPY);  Surgeon: Gadiel De MD;  Location: [facility];  Service: Endoscopy;  Laterality: N/A;    HEMORRHOID SURGERY      KNEE SURGERY      right knee    thyroid removed      TONSILLECTOMY      TUBAL LIGATION         family history includes Breast cancer in her mother and sister; Cancer in her maternal aunt and mother; Diabetes in her maternal grandmother and paternal grandmother; Drug abuse in her  maternal aunt; Hyperlipidemia in her father; Hypertension in her brother and mother.    Social History     Tobacco Use    Smoking status: Never     Passive exposure: Never    Smokeless tobacco: Never   Substance Use Topics    Alcohol use: Yes     Alcohol/week: 8.0 standard drinks of alcohol     Types: 3 Glasses of wine, 5 Cans of beer per week    Drug use: No       Review of Systems   Constitutional:  Negative for chills and fever.   HENT:  Negative for congestion, sinus pain and sore throat.    Eyes:  Negative for blurred vision.   Respiratory:  Negative for cough, shortness of breath and wheezing.    Cardiovascular:  Negative for chest pain, palpitations and leg swelling.   Gastrointestinal:  Negative for abdominal pain, constipation, diarrhea, nausea and vomiting.   Genitourinary:  Negative for dysuria.   Musculoskeletal:  Negative for myalgias.   Skin:  Negative for rash.   Neurological:  Negative for dizziness, weakness and headaches.   Psychiatric/Behavioral:  Negative for depression. The patient is not nervous/anxious.         Outpatient Encounter Medications as of 3/13/2024   Medication Sig Dispense Refill    calcium carbonate (OS-FLO) 500 mg calcium (1,250 mg) tablet Take 2 tablets by mouth 2 (two) times daily.        fish oil-omega-3 fatty acids 300-1,000 mg capsule Take 2 g by mouth once daily.      levothyroxine (SYNTHROID) 137 MCG Tab tablet Take 137 mcg by mouth.      propranoloL (INDERAL) 60 MG tablet Take 1 tablet (60 mg total) by mouth 2 (two) times daily. 180 tablet 3    [DISCONTINUED] acetaminophen (TYLENOL EXTRA STRENGTH) 500 MG tablet Take 2 tablets (1,000 mg total) by mouth every 6 (six) hours as needed for Pain. 60 tablet 0    [DISCONTINUED] gabapentin (NEURONTIN) 300 MG capsule Take 1 capsule (300 mg total) by mouth every evening for 1 day, THEN 1 capsule (300 mg total) 2 (two) times daily for 1 day, THEN 1 capsule (300 mg total) 3 (three) times daily. 90 capsule 0    [DISCONTINUED] meloxicam  "(MOBIC) 15 MG tablet Take 1 tablet (15 mg total) by mouth once daily. 30 tablet 0    buPROPion (WELLBUTRIN XL) 150 MG TB24 tablet Take 1 tablet (150 mg total) by mouth once daily. 30 tablet 11    [DISCONTINUED] benzonatate (TESSALON) 100 MG capsule Take 1 capsule (100 mg total) by mouth 3 (three) times daily as needed for Cough. (Patient not taking: Reported on 2/28/2024) 30 capsule 0     No facility-administered encounter medications on file as of 3/13/2024.        Review of patient's allergies indicates:   Allergen Reactions    Paroxetine hcl Rash and Nausea And Vomiting       Physical Exam      Vital Signs  Pulse: 86  Resp: 16  SpO2: 98 %  BP: 116/80  BP Location: Right arm  Patient Position: Sitting  Height and Weight  Height: 5' 3" (160 cm)  Weight: 71.2 kg (156 lb 15.5 oz)  BSA (Calculated - sq m): 1.78 sq meters  BMI (Calculated): 27.8  Weight in (lb) to have BMI = 25: 140.8    Physical Exam  Vitals and nursing note reviewed.   Constitutional:       General: She is not in acute distress.     Appearance: Normal appearance. She is well-developed. She is not ill-appearing.   HENT:      Head: Normocephalic and atraumatic.      Right Ear: External ear normal.      Left Ear: External ear normal.   Eyes:      Conjunctiva/sclera: Conjunctivae normal.      Pupils: Pupils are equal, round, and reactive to light.   Neck:      Thyroid: No thyromegaly.      Vascular: No JVD.      Trachea: No tracheal deviation.   Cardiovascular:      Rate and Rhythm: Normal rate and regular rhythm.      Heart sounds: Normal heart sounds. No murmur heard.  Pulmonary:      Effort: Pulmonary effort is normal.      Breath sounds: Normal breath sounds.   Chest:      Chest wall: No tenderness.   Abdominal:      General: Bowel sounds are normal.      Palpations: Abdomen is soft.      Tenderness: There is no abdominal tenderness. There is no guarding.   Musculoskeletal:         General: Normal range of motion.      Cervical back: Normal range of " motion and neck supple.   Lymphadenopathy:      Cervical: No cervical adenopathy.   Skin:     General: Skin is warm and dry.   Neurological:      General: No focal deficit present.      Mental Status: She is alert and oriented to person, place, and time.   Psychiatric:         Mood and Affect: Mood normal.         Behavior: Behavior normal.         Thought Content: Thought content normal.         Judgment: Judgment normal.          Laboratory:  CBC:  Lab Results   Component Value Date    WBC 6.82 03/13/2024    RBC 4.65 03/13/2024    HGB 13.7 03/13/2024    HCT 41.1 03/13/2024     03/13/2024    MCV 88 03/13/2024    MCH 29.5 03/13/2024    MCHC 33.3 03/13/2024    MCHC 33.9 09/14/2023    MCHC 32.9 07/18/2023     CMP:  Lab Results   Component Value Date     (H) 03/13/2024    CALCIUM 9.8 03/13/2024    ALBUMIN 3.8 03/13/2024    PROT 8.0 03/13/2024     03/13/2024    K 4.4 03/13/2024    CO2 26 03/13/2024     03/13/2024    BUN 7 03/13/2024    ALKPHOS 73 03/13/2024    ALT 29 03/13/2024    AST 33 03/13/2024    BILITOT 0.5 03/13/2024    BILITOT 0.4 07/18/2023    BILITOT 0.7 07/11/2023     URINALYSIS:  Lab Results   Component Value Date    COLORU Yellow 10/15/2019    SPECGRAV <=1.005 (A) 10/15/2019    PHUR 7 11/08/2023    PROTEINUA Negative 10/15/2019    BACTERIA Rare 07/05/2012    NITRITE Negative 10/15/2019    LEUKOCYTESUR Negative 10/15/2019    UROBILINOGEN Negative 10/15/2019      LIPIDS:  Lab Results   Component Value Date    TSH 0.421 03/13/2024    TSH 1.012 10/17/2023    TSH 0.185 (L) 07/11/2023    HDL 53 07/11/2023    HDL 45 08/14/2020    HDL 47 02/05/2015    CHOL 218 (H) 07/11/2023    CHOL 202 (H) 08/14/2020    CHOL 193 02/05/2015    TRIG 142 07/11/2023    TRIG 107 08/14/2020    TRIG 152 (H) 02/05/2015    LDLCALC 136.6 07/11/2023    LDLCALC 135.6 08/14/2020    LDLCALC 115.6 02/05/2015    CHOLHDL 24.3 07/11/2023    CHOLHDL 22.3 08/14/2020    CHOLHDL 24.4 02/05/2015    NONHDLCHOL 165 07/11/2023     NONHDLCHOL 157 08/14/2020    NONHDLCHOL 146 02/05/2015    TOTALCHOLEST 4.1 07/11/2023    TOTALCHOLEST 4.5 08/14/2020    TOTALCHOLEST 4.1 02/05/2015     TSH:  Lab Results   Component Value Date    TSH 0.421 03/13/2024    TSH 1.012 10/17/2023    TSH 0.185 (L) 07/11/2023     A1C:  Lab Results   Component Value Date    HGBA1C 5.7 (H) 03/13/2024    HGBA1C 5.4 07/11/2023    HGBA1C 5.6 08/14/2020         Assessment/Plan     Mary Alice Garcia is a 58 y.o.female with:    Essential hypertension  -     CBC Auto Differential; Future; Expected date: 03/13/2024  -     Comprehensive Metabolic Panel; Future; Expected date: 03/13/2024  -     TSH; Future; Expected date: 03/13/2024  -     T4, Free; Future; Expected date: 03/13/2024  -     Hemoglobin A1C; Future; Expected date: 03/13/2024  -     buPROPion (WELLBUTRIN XL) 150 MG TB24 tablet; Take 1 tablet (150 mg total) by mouth once daily.  Dispense: 30 tablet; Refill: 11    Other migraine without status migrainosus, not intractable  -     CBC Auto Differential; Future; Expected date: 03/13/2024  -     Comprehensive Metabolic Panel; Future; Expected date: 03/13/2024  -     TSH; Future; Expected date: 03/13/2024  -     T4, Free; Future; Expected date: 03/13/2024  -     Hemoglobin A1C; Future; Expected date: 03/13/2024  -     buPROPion (WELLBUTRIN XL) 150 MG TB24 tablet; Take 1 tablet (150 mg total) by mouth once daily.  Dispense: 30 tablet; Refill: 11    Acquired hypothyroidism  -     CBC Auto Differential; Future; Expected date: 03/13/2024  -     Comprehensive Metabolic Panel; Future; Expected date: 03/13/2024  -     TSH; Future; Expected date: 03/13/2024  -     T4, Free; Future; Expected date: 03/13/2024  -     Hemoglobin A1C; Future; Expected date: 03/13/2024  -     buPROPion (WELLBUTRIN XL) 150 MG TB24 tablet; Take 1 tablet (150 mg total) by mouth once daily.  Dispense: 30 tablet; Refill: 11    Gastroesophageal reflux disease without esophagitis  -     CBC Auto Differential; Future;  Expected date: 03/13/2024  -     Comprehensive Metabolic Panel; Future; Expected date: 03/13/2024  -     TSH; Future; Expected date: 03/13/2024  -     T4, Free; Future; Expected date: 03/13/2024  -     Hemoglobin A1C; Future; Expected date: 03/13/2024  -     buPROPion (WELLBUTRIN XL) 150 MG TB24 tablet; Take 1 tablet (150 mg total) by mouth once daily.  Dispense: 30 tablet; Refill: 11    Moderate episode of recurrent major depressive disorder  -     CBC Auto Differential; Future; Expected date: 03/13/2024  -     Comprehensive Metabolic Panel; Future; Expected date: 03/13/2024  -     TSH; Future; Expected date: 03/13/2024  -     T4, Free; Future; Expected date: 03/13/2024  -     Hemoglobin A1C; Future; Expected date: 03/13/2024  -     buPROPion (WELLBUTRIN XL) 150 MG TB24 tablet; Take 1 tablet (150 mg total) by mouth once daily.  Dispense: 30 tablet; Refill: 11          Health Maintenance Due   Topic Date Due    Influenza Vaccine (1) 09/01/2023        I spent 38 minutes on the day of this encounter for preparing for, evaluating, treating, and managing this patient.      -Continue current medications and maintain follow up with specialists.  Return to clinic in 6 months or sooner for any concerns No follow-ups on file.       JULISA Dover  [facility]

## 2024-03-13 NOTE — TELEPHONE ENCOUNTER
----- Message from Brenda Tomas sent at 3/13/2024  8:50 AM CDT -----  .Type:  Needs Medical Advice    Who Called: pt  Symptoms (please be specific):    How long has patient had these symptoms:    Pharmacy name and phone #:    Would the patient rather a call back or a response via [facility]?   Best Call Back Number:  [phone]  Additional Information: needs to speak to the office about paperwork she wants to drop off

## 2024-03-15 DIAGNOSIS — M25.561 RIGHT KNEE PAIN, UNSPECIFIED CHRONICITY: Primary | ICD-10-CM

## 2024-03-16 ENCOUNTER — PATIENT MESSAGE (OUTPATIENT)
Dept: PRIMARY CARE CLINIC | Facility: CLINIC | Age: 59
End: 2024-03-16
Payer: COMMERCIAL

## 2024-03-18 ENCOUNTER — TELEPHONE (OUTPATIENT)
Dept: PRIMARY CARE CLINIC | Facility: CLINIC | Age: 59
End: 2024-03-18
Payer: COMMERCIAL

## 2024-03-18 NOTE — TELEPHONE ENCOUNTER
----- Message from Alisha Hicks sent at 3/18/2024  1:25 PM CDT -----  Contact: Pt  [phone]  RX name and strength (copy/paste from chart):    levothyroxine (SYNTHROID) 137 MCG Tab tablet  Directions (copy/paste from chart):   Is this a 30 day or 90 day RX:  -  Local pharmacy or mail order pharmacy:  local  Pharmacy name and phone # (copy/paste from chart):       [id number] - [facility] [address] [facility] [address]  [address] [facility] [address]  Phone: [phone] Fax: [phone]    Additional information:       Pt is needing to know if the provider wants her to continue this medication or if she'd like for her to stop it. She'd also like to know if her FMLA documents were received.

## 2024-03-20 ENCOUNTER — OFFICE VISIT (OUTPATIENT)
Dept: URGENT CARE | Facility: CLINIC | Age: 59
End: 2024-03-20
Payer: OTHER GOVERNMENT

## 2024-03-20 VITALS
SYSTOLIC BLOOD PRESSURE: 131 MMHG | HEIGHT: 63 IN | OXYGEN SATURATION: 98 % | RESPIRATION RATE: 18 BRPM | HEART RATE: 80 BPM | BODY MASS INDEX: 27.81 KG/M2 | TEMPERATURE: 98 F | WEIGHT: 156.94 LBS | DIASTOLIC BLOOD PRESSURE: 90 MMHG

## 2024-03-20 DIAGNOSIS — M51.36 ANNULAR TEAR OF LUMBAR DISC: ICD-10-CM

## 2024-03-20 DIAGNOSIS — M54.42 CHRONIC LEFT-SIDED LOW BACK PAIN WITH LEFT-SIDED SCIATICA: ICD-10-CM

## 2024-03-20 DIAGNOSIS — S39.012D STRAIN OF LUMBAR REGION, SUBSEQUENT ENCOUNTER: Primary | ICD-10-CM
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
## 2024-03-20 DIAGNOSIS — Z02.6 ENCOUNTER RELATED TO WORKER'S COMPENSATION CLAIM: ICD-10-CM

## 2024-03-20 DIAGNOSIS — G89.29 CHRONIC LEFT-SIDED LOW BACK PAIN WITH LEFT-SIDED SCIATICA: ICD-10-CM

## 2024-03-20 PROCEDURE — 99213 OFFICE O/P EST LOW 20 MIN: CPT | Mod: S$GLB,,, | Performed by: PHYSICIAN ASSISTANT

## 2024-03-20 RX ORDER — GABAPENTIN 600 MG/1
600 TABLET ORAL 3 TIMES DAILY
Qty: 90 TABLET | Refills: 1 | Status: SHIPPED | OUTPATIENT
Start: 2024-03-20 | End: 2024-05-24 | Stop reason: SDUPTHER

## 2024-03-20 RX ORDER — ACETAMINOPHEN 500 MG
1000 TABLET ORAL EVERY 6 HOURS PRN
Qty: 60 TABLET | Refills: 0 | Status: SHIPPED | OUTPATIENT
Start: 2024-03-20 | End: 2025-03-20

## 2024-03-20 NOTE — PROGRESS NOTES
Subjective:      Patient ID: Mary Alice Garcia is a 58 y.o. female.    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  She reports no improvement since last office visit.  She was scheduled for pain management however the appointment was placed under her personal insurance, therefore the appointment was canceled.  She has since rescheduled under worker's comp with pain management later today.   Patient has been taking gabapentin and Tylenol.  Says the gabapentin helps take the edge off. MEB    Back Pain  This is a new problem. The current episode started more than 1 month ago. The problem occurs constantly. The quality of the pain is described as stabbing (throbbing). The pain radiates to the left foot and left thigh. The pain is at a severity of 6/10. The pain is moderate. The pain is Worse during the day. The symptoms are aggravated by bending. Stiffness is present At night. Pertinent negatives include no numbness.     Constitution: Positive for activity change.   Musculoskeletal:  Positive for pain and back pain.   Neurological:  Negative for numbness and tingling.     Objective:     Physical Exam  Vitals and nursing note reviewed.   Constitutional:       General: She is not in acute distress.     Appearance: Normal appearance. She is well-developed.   HENT:      Head: Normocephalic and atraumatic.      Right Ear: Hearing and external ear normal.      Left Ear: Hearing and external ear normal.      Nose: Nose normal. No nasal deformity.   Eyes:      General: Lids are normal.      Conjunctiva/sclera: Conjunctivae normal.      Right eye: Right conjunctiva  is not injected.      Left eye: Left conjunctiva is not injected.   Neck:      Trachea: Trachea normal.   Pulmonary:      Effort: Pulmonary effort is normal. No respiratory distress.      Breath sounds: No stridor.   Musculoskeletal:      Cervical back: Normal and normal range of motion. No spinous process tenderness or muscular tenderness.      Thoracic back: Normal.      Lumbar back: Tenderness present. No deformity. Decreased range of motion (Flexion to 60° at the waist). Positive left straight leg raise test. Negative right straight leg raise test.        Back:       Comments: TINO positive on the left   Skin:     General: Skin is warm and dry.      Findings: No abrasion or bruising.   Neurological:      General: No focal deficit present.      Mental Status: She is alert.   Psychiatric:         Attention and Perception: She is attentive.         Speech: Speech normal.         Behavior: Behavior normal.         Thought Content: Thought content normal.        Assessment:      1. Strain of lumbar region, subsequent encounter    2. Encounter related to worker's compensation claim    3. Chronic left-sided low back pain with left-sided sciatica    4. Annular tear of lumbar disc    5. Work related injury      Plan:       Patient to see pain management today for evaluation.   I will order physical therapy since we finally have insurance authorization through department of Labor.   Encouraged warm soaks, daily home exercises.  I provided a home exercise routine.   Return to clinic in 1 month or sooner as needed.  Patient verbalized understanding and agreement.      Medications Ordered This Encounter   Medications    acetaminophen (TYLENOL EXTRA STRENGTH) 500 MG tablet     Sig: Take 2 tablets (1,000 mg total) by mouth every 6 (six) hours as needed for Pain.     Dispense:  60 tablet     Refill:  0    gabapentin (NEURONTIN) 600 MG tablet     Sig: Take 1 tablet (600 mg total) by mouth 3 (three) times daily.     Dispense:   90 tablet     Refill:  1     Patient Instructions: Attention not to aggravate affected area, Daily home exercises/warm soaks, PT to be scheduled once authorized   Restrictions:  (See CA-17)  Follow up in about 5 weeks (around 4/24/2024).

## 2024-03-20 NOTE — LETTER
[facility] [address] [address]  Phone: [phone]  Fax: [phone]  [facility]: [email or web address]    Pt Name: Mary Alice Garcia  Injury Date: 08/19/2023   Employee ID: [id number] Date of Treatment: 03/20/2024   Company: [employer]      Appointment Time: 08:15 AM Arrived: 9:00 AM   Provider: Garrick Hernandez PA-C Time Out: 10:20 AM     Office Treatment:   1. Strain of lumbar region, subsequent encounter    2. Encounter related to worker's compensation claim    3. Chronic left-sided low back pain with left-sided sciatica    4. Annular tear of lumbar disc    5. Work related injury      Medications Ordered This Encounter   Medications    acetaminophen (TYLENOL EXTRA STRENGTH) 500 MG tablet    gabapentin (NEURONTIN) 600 MG tablet      Patient Instructions: Attention not to aggravate affected area, Daily home exercises/warm soaks, PT to be scheduled once authorized      Restrictions:  (See CA-17)     Return Appointment: 4/24/2024 at 9:00 AM    TABATHA

## 2024-03-22 ENCOUNTER — PATIENT MESSAGE (OUTPATIENT)
Dept: PRIMARY CARE CLINIC | Facility: CLINIC | Age: 59
End: 2024-03-22
Payer: COMMERCIAL

## 2024-03-25 ENCOUNTER — PATIENT MESSAGE (OUTPATIENT)
Dept: PRIMARY CARE CLINIC | Facility: CLINIC | Age: 59
End: 2024-03-25
Payer: COMMERCIAL

## 2024-03-25 ENCOUNTER — TELEPHONE (OUTPATIENT)
Dept: PRIMARY CARE CLINIC | Facility: CLINIC | Age: 59
End: 2024-03-25
Payer: COMMERCIAL

## 2024-03-25 NOTE — TELEPHONE ENCOUNTER
----- Message from Chris Hammond MA sent at 3/25/2024 11:27 AM CDT -----  Contact: gregg@ [phone]  Pt called            In regards to needing to speak with staff or provider to discuss if [facility] paperwork papers were ready for .

## 2024-03-27 ENCOUNTER — PATIENT MESSAGE (OUTPATIENT)
Dept: PRIMARY CARE CLINIC | Facility: CLINIC | Age: 59
End: 2024-03-27
Payer: COMMERCIAL

## 2024-03-28 ENCOUNTER — TELEPHONE (OUTPATIENT)
Dept: PRIMARY CARE CLINIC | Facility: CLINIC | Age: 59
End: 2024-03-28
Payer: COMMERCIAL

## 2024-04-05 ENCOUNTER — TELEPHONE (OUTPATIENT)
Dept: PRIMARY CARE CLINIC | Facility: CLINIC | Age: 59
End: 2024-04-05
Payer: COMMERCIAL

## 2024-04-05 NOTE — TELEPHONE ENCOUNTER
----- Message from Rozina Mendez sent at 4/5/2024  9:49 AM CDT -----    Patient Returning Call        Who Called:pt  Does the patient know what this is regarding?:pt came to bring another copy of [facility] paperwork and no one was there   Would the patient rather a call back or a response via [facility]? call  Best Call Back Number:[phone]  Additional Information: call back

## 2024-04-05 NOTE — TELEPHONE ENCOUNTER
Spoke with patient to inform her that Kelley will be calling her sometime today to go over paperwork with her

## 2024-04-19 ENCOUNTER — CLINICAL SUPPORT (OUTPATIENT)
Dept: REHABILITATION | Facility: HOSPITAL | Age: 59
End: 2024-04-19
Payer: OTHER GOVERNMENT

## 2024-04-19 DIAGNOSIS — M53.87 SCIATICA ASSOCIATED WITH DISORDER OF LUMBOSACRAL SPINE: Primary | ICD-10-CM

## 2024-04-19 DIAGNOSIS — R29.898 IMPAIRED STRENGTH OF LOWER EXTREMITY: ICD-10-CM

## 2024-04-19 DIAGNOSIS — Z74.09 IMPAIRED FUNCTIONAL MOBILITY AND ACTIVITY TOLERANCE: ICD-10-CM

## 2024-04-19 PROCEDURE — 97110 THERAPEUTIC EXERCISES: CPT | Mod: PN

## 2024-04-19 PROCEDURE — 97162 PT EVAL MOD COMPLEX 30 MIN: CPT | Mod: PN

## 2024-04-19 NOTE — PROGRESS NOTES
"[facility]   Physical Therapy Initial Evaluation      Name: Mary Alice Garcia  [facility] Number: [id number]    Therapy Diagnosis: No diagnosis found.     Physician: Garrick Hernandez PA-C    Physician Orders: PT Eval and Treat   Medical Diagnosis from Referral: S39.012D (ICD-10-CM) - Strain of lumbar region, subsequent encounter  S39.012D (ICD-10-CM) - Strain of lumbar region, subsequent encounter   CLAIM#[id number] DOI:[id number] LUMBAR INJURY  Auth Request Number: [id number]     Evaluation Date: 4/19/2024  Authorization Period Expiration: 05/24/2024  Plan of Care Expiration: End  Progress Note Due: ***  Visit # / Visits authorized: 1/ 12   FOTO: 1/ 3    Precautions: Standard,  light duty at work    Time In: 8:30 am (pt late)  Time Out: 9:07am  Total Billable Time: 37 minutes- 1 Eval (MCE), 1 TE    Subjective     Date of onset: 8/19/24  From MD visit:  Patient's place of employment -[employer]   Patient's job title - "[employer]  Date of Injury - 8/19/2023Body part injured - L Lower Back  Current work status per last visit - Light Duty   Improved, same, or worse - Same   Pain Scale right now (1-10) -  6/10    History of current condition - Mary Alice reports: previous injury 2021 as well (similar).  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 At that time, extreme pain. At that time, they gave her medicine, stayed home for three days. Thereafter, gabapentin takes edge off. Light duty at this time, nothing over 20 lbs, "sitting only". 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 She has a stool that she utilizes as much as possible. Can't hold her urine, urgency since her injury.  Laughing or sneezing is problematic as well. Laying on each side and on back, it hurts.    Falls: none    Imaging: EXAMINATION:  MRI LUMBAR SPINE WITHOUT CONTRAST     CLINICAL HISTORY:  Low back pain, progressive neurologic deficit; Radiculopathy, site unspecified   "   TECHNIQUE:  Multiplanar, multisequence MR images were acquired from the thoracolumbar junction to the sacrum without the administration of contrast.     COMPARISON:  Lumbar spine radiograph 08/22/2023     FINDINGS:  ALIGNMENT: Sagittal alignment is maintained.     BONE: No compression fractures.  No marrow replacing lesions.     Disc: Mild disc desiccation at L2-L3 and L5-S1. No bone marrow edema.     SPINAL CANAL: The conus medullaris has a normal appearance and terminates at the L1 level.  Cauda equina nerve roots are unremarkable.  No epidural mass or collection.     PARASPINAL SOFT TISSUES: Cholelithiasis.  The visualized portions of the common bile duct is within normal limits of caliber     SIGNIFICANT FINDINGS BY LEVEL:     T12-L1:  No spinal canal stenosis or neural foraminal narrowing.     L1-2: No spinal canal stenosis or neural foraminal narrowing.     L2-3: Minimal circumferential disc bulge.  No significant spinal canal stenosis or neural foraminal narrowing.     L3-4: Minimal bilateral facet arthropathy.  No spinal canal stenosis or neural foraminal narrowing.     L4-5: No spinal canal stenosis or neural foraminal narrowing.     L5-S1: Mild broad-based posterior disc bulge.  Posterior annular fissure.  No spinal canal stenosis or neural foraminal narrowing.     Impression:     No acute fracture or traumatic malalignment of the lumbar spine.     Mild degenerative changes including a posterior annular fissure at L5-S1.  No significant spinal canal stenosis or neural foraminal narrowing.     Cholelithiasis.       Prior Therapy: yes, right knee and back  Social History: lives with son, 29 y.o.    Occupation:   Prior Level of Function: independent  Current Level of Function: independent difficulty tying shoes. Sits to do so. Stinging sensation when tries to do it by stooping over.    Pain:  Current 6/10, worst 8/10 (due to heavier work or moving more than normal), best 5/10   Location: left low  back, posterior thigh and heel and lateral foot  Description: pulling, throbbing  Aggravating Factors: sitting, standing, squatting.   Easing Factors:  laying    Patients goals: Get back to 100%.      Medical History:   Past Medical History:   Diagnosis Date    Arthritis     Depression     Hyperlipidemia     Hypertension     2013    Syncope and collapse     Thyroid disease     Tuberculosis        Surgical History:   Mary Alice Garcia  has a past surgical history that includes  section; Tonsillectomy; Tubal ligation; Knee surgery; Hemorrhoid surgery; thyroid removed; Esophagogastroduodenoscopy (2020); Esophagogastroduodenoscopy (N/A, 2020); Bunionectomy (Left); Colonoscopy (N/A, 10/25/2023); and Esophagogastroduodenoscopy (N/A, 10/25/2023).    Medications:   Mary Alice has a current medication list which includes the following prescription(s): acetaminophen, bupropion, calcium carbonate, fish oil-omega-3 fatty acids, gabapentin, levothyroxine, and propranolol.    Allergies:   Review of patient's allergies indicates:   Allergen Reactions    Paroxetine hcl Rash and Nausea And Vomiting        Objective        Posture:  ***    Gross movement analysis:  -Gait: ***  -Forward bending: ***  -Squat: ***  -Single leg stance:***    Lumbar Range of Motion:    Degrees Pain   Flexion (T12- S1) WNL NP     Flexion (thoracic) (C7-L1)     Extension (T12-S1) WNL NP   Left Side Bending (T12-S1) WNL NP   Right Side Bending (T12-S1) WNL NP   Left rotation   WNL NP   Right Rotation   WNL NP      Range of Motion:   LE Right Left   Hip flexion WNL WNL   Hip abduction WNL WNL   Hip ER WNL WNL   Hip IR  WNL WNL   Hip extension WNL WNL   Knee WNL WNL   Ankle DF WNL WNL     Lower Extremity Strength    Right Left   Hip flexion: 5 3/5   Hip extension: 4+/5 3-/5 !   Hip abduction: 5/ 3/5 !   Hip ER:  4-/5 5/5   Hip IR:  5/5   Knee extension:  4/5 pain guarding   Knee flexion: 5/ 4/5 pain guarding   Ankle dorsiflexion:  5/5    Great toe extension: 5/5 5/5       Special Tests:  -Lumbar quadrant test: ***  -Slump test:***  -Repeated Flexion: pain  -Repeated Ext: ***  -Piriformis Test: ***  -SLR neural tension test: positive  - Thoracic distraction test: ***  Flexibility:   Ely's test: postiive low back pain   Popliteal Angle:***   Emerson test: ***   P/SLR test: ***  -[employer]rmann core stability test:***    DTR:   Right Left   Patellar (L3-4) 2+ 2+   Achilles (S1) 2+ 2+     Joint Mobility: ***    Palpation:tender at L5-S1, gluteus medius. Aberrant motion with hip extension in prone. Tender at TFL, greater trochanter, IT Band (prox)    Sensation: Intact light touch sensation to BLE through L2-S2 dermatomal distribution      Intake Outcome Measure for FOTO *** Survey    Therapist reviewed FOTO scores for Mary Alice Garcia on 4/19/2024.   FOTO report - see Media section or FOTO account episode details.    Intake Score: ***%         Treatment     Total Treatment time (time-based codes) separate from Evaluation: *** minutes     Mary Alice received the treatments listed below:      therapeutic exercises to develop {AMB PT PROGRESS OBJECTIVE:08900} for *** minutes including:  +Seated nerve glides  +TA activation with diaphragmatic breathing    manual therapy techniques: {AMB PT PROGRESS MANUAL THERAPY:52120} were applied to the: *** for *** minutes, including:  ***    neuromuscular re-education activities to improve: {AMB PT PROGRESS NEURO RE-ED:16910} for *** minutes. The following activities were included:  ***    therapeutic activities to improve functional performance for ***  minutes, including:  ***    gait training to improve functional mobility and safety for ***  minutes, including:  ***    direct contact modalities after being cleared for contraindications: {AMB PT PROGRESS DIRECT CONTACT MODES:34881}    supervised modalities after being cleared for contradictions: {AMB PT SUPERVISED MODES:56534}    hot pack for *** minutes to ***.    cold pack for  "*** minutes to ***.    Patient Education and Home Exercises     Education provided:   - Findings of today's evaluation  - Plan of care      Written Home Exercises Provided: {Blank single:81927::"yes","Patient instructed to cont prior HEP"}. Exercises were reviewed and Mary Alice was able to demonstrate them prior to the end of the session.  Mary Alice demonstrated {Desc; good/fair/poor:92884} understanding of the education provided. See EMR under Patient Instructions for exercises provided during therapy sessions.    Assessment     Mary Alice is a 59 y.o. female referred to outpatient Physical Therapy with a medical diagnosis of ***. Patient presents with ***    Patient prognosis is {REHAB PROGNOSIS OHS:74649}.   Patient will benefit from skilled outpatient Physical Therapy to address the deficits stated above and in the chart below, provide patient /family education, and to maximize patientt's level of independence.     Plan of care discussed with patient: {YES:62593}  Patient's spiritual, cultural and educational needs considered and patient is agreeable to the plan of care and goals as stated below:     Anticipated Barriers for therapy: ***    Medical Necessity is demonstrated by the following  History  Co-morbidities and personal factors that may impact the plan of care [] LOW: no personal factors / co-morbidities  [] MODERATE: 1-2 personal factors / co-morbidities  [] HIGH: 3+ personal factors / co-morbidities    Moderate / High Support Documentation:   Co-morbidities affecting plan of care: ***    Personal Factors:   {Personal Factors:33720}     Examination  Body Structures and Functions, activity limitations and participation restrictions that may impact the plan of care [] LOW: addressing 1-2 elements  [] MODERATE: 3+ elements  [] HIGH: 4+ elements (please support below)    Moderate / High Support Documentation: ***     Clinical Presentation [] LOW: stable  [] MODERATE: Evolving  [] HIGH: Unstable     Decision Making/ " Complexity Score: {Desc; low/moderate/high:925451}       Goals:  Short Term Goals: *** weeks   ***    Long Term Goals: *** weeks   ***  Plan     Plan of care Certification: 4/19/2024 to ***.    Outpatient Physical Therapy {NUMBERS 1-5:59061} times weekly for 8 weeks to include the following interventions: Gait Training***, Manual Therapy, Neuromuscular Re-ed, Patient Education, Therapeutic Activities, and Therapeutic Exercise.     Floresita Merida, PT        Physician's Signature: _________________________________________ Date: ________________

## 2024-04-19 NOTE — PROGRESS NOTES
"[facility]   Physical Therapy Initial Evaluation /treatment     Name: Mary Alice Garcia  Clinic Number: [id number]    Therapy Diagnosis:   Encounter Diagnoses   Name Primary?    Sciatica associated with disorder of lumbosacral spine Yes    Impaired strength of lower extremity     Impaired functional mobility and activity tolerance      Physician: Garrick Hernandez, JEAN PAUL    Physician Orders: PT Eval and Treat   Medical Diagnosis from Referral: S39.012D (ICD-10-CM) - Strain of lumbar region, subsequent encounter  S39.012D (ICD-10-CM) - Strain of lumbar region, subsequent encounter   CLAIM#[id number] DOI:[id number] LUMBAR INJURY  Auth Request Number: [id number]   Evaluation Date: 4/19/24  Authorization Period Expiration:  03/26/2024  Plan of Care Expiration: End of 12 visits  Visit # / Visits authorized: 1/ 12    Time In: 8:30 am (pt late)  Time Out: 9:07am  Total Billable Time: 37 minutes- 1 Eval (MCE), 1 TE      #1/3 4/19/24 43%   #2/3     #3/3         Precautions: Standard,  light duty at work    Subjective   Date of onset: 8/19/24  From MD visit:  Patient's place of employment -[employer]   Patient's job title -   Date of Injury - 8/19/2023Body part injured - L Lower Back  Current work status per last visit - Light Duty   Improved, same, or worse - Same   Pain Scale right now (1-10) -  6/10    Falls: none       Date of injury: 8/19/23  History of current condition -  Mary Alice reports: previous injury 2021 as well (similar).  Lifted a tray of mail overhead, felt a pull. Went to the emergency room immediately. At that time, extreme pain; they gave her medicine, recommended she stay home for three days. Now, gabapentin takes edge off. Light duty at this time, no lifting over 20 lbs, "sitting only". 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 She has a high stool that she utilizes as much as possible, no back support. Can't hold her urine since this injury, " experiencing urgency since her injury.  Laughing or sneezing is problematic as well. Laying on each side and on back, it hurts.     Prior medical treatment: gabapentin    Occupation/job title:   Job demands: lifting, bending, twisting, standing, walking, stooping/squatting    Current work restrictions: light duty (<20# lifting)  Previous work status: full duty, full-time  Current work status: light duty  Date last worked (if applicable): currently working.    Imaging: Imaging: EXAMINATION:  MRI LUMBAR SPINE WITHOUT CONTRAST     CLINICAL HISTORY:  Low back pain, progressive neurologic deficit; Radiculopathy, site unspecified     TECHNIQUE:  Multiplanar, multisequence MR images were acquired from the thoracolumbar junction to the sacrum without the administration of contrast.     COMPARISON:  Lumbar spine radiograph 08/22/2023     FINDINGS:  ALIGNMENT: Sagittal alignment is maintained.     BONE: No compression fractures.  No marrow replacing lesions.     Disc: Mild disc desiccation at L2-L3 and L5-S1. No bone marrow edema.     SPINAL CANAL: The conus medullaris has a normal appearance and terminates at the L1 level.  Cauda equina nerve roots are unremarkable.  No epidural mass or collection.     PARASPINAL SOFT TISSUES: Cholelithiasis.  The visualized portions of the common bile duct is within normal limits of caliber     SIGNIFICANT FINDINGS BY LEVEL:     T12-L1:  No spinal canal stenosis or neural foraminal narrowing.     L1-2: No spinal canal stenosis or neural foraminal narrowing.     L2-3: Minimal circumferential disc bulge.  No significant spinal canal stenosis or neural foraminal narrowing.     L3-4: Minimal bilateral facet arthropathy.  No spinal canal stenosis or neural foraminal narrowing.     L4-5: No spinal canal stenosis or neural foraminal narrowing.     L5-S1: Mild broad-based posterior disc bulge.  Posterior annular fissure.  No spinal canal stenosis or neural foraminal narrowing.      Impression:  No acute fracture or traumatic malalignment of the lumbar spine.  Mild degenerative changes including a posterior annular fissure at L5-S1.  No significant spinal canal stenosis or neural foraminal narrowing.   Cholelithiasis.    Social History: son lives with her, 29 y.o.   Prior Therapy: yes, right knee and back  Occupation:   Prior Level of Function: independent  Current Level of Function: independent difficulty tying shoes. Sits to do so. Stinging sensation when tries to do it by stooping over.     Pain:  Current 6/10, worst 8/10 (due to heavier work or moving more than normal), best 5/10   Location: left low back, posterior thigh and heel and lateral foot  Description: pulling, throbbing  Aggravating Factors: sitting, standing, squatting.   Easing Factors: laying    Pt's goals: Return to employment according to previous level of function, get back to 100%    Medical History:   Past Medical History:   Diagnosis Date    Arthritis     Depression     Hyperlipidemia     Hypertension     2013    Syncope and collapse     Thyroid disease     Tuberculosis        Surgical History:   Mary Alice Garcia  has a past surgical history that includes  section; Tonsillectomy; Tubal ligation; Knee surgery; Hemorrhoid surgery; thyroid removed; Esophagogastroduodenoscopy (2020); Esophagogastroduodenoscopy (N/A, 2020); Bunionectomy (Left); Colonoscopy (N/A, 10/25/2023); and Esophagogastroduodenoscopy (N/A, 10/25/2023).    Medications:   Mary Alice has a current medication list which includes the following prescription(s): acetaminophen, bupropion, calcium carbonate, fish oil-omega-3 fatty acids, gabapentin, levothyroxine, and propranolol.    Allergies:   Review of patient's allergies indicates:   Allergen Reactions    Paroxetine hcl Rash and Nausea And Vomiting        Objective      Posture:  Rounded shoulders  Lower Extremity Strength    Right Left   Hip flexion: 5/5 3/5   Hip extension: 4+/5 3-/5  !   Hip abduction: 5/5 3/5 !   Hip ER:  4-/5 5/5   Hip IR: 5/5 5/5   Knee extension: 5/5 4/5 pain guarding   Knee flexion: 5/5 4/5 pain guarding   Ankle dorsiflexion: 5/5 5/5   Great toe extension: 5/5 5/5       Special Tests:  - Slump (L  -Repeated Flexion: pain  -Repeated Ext: pain  -Piriformis Test: pain (L side)  -SLR neural tension test: positive L  Flexibility:   Ely's test: postive low back pain (L)    Palpation/joint mobilization: tender at L5-S1 P-A mobilization, tender at L gluteus medius. Aberrant motion with hip extension in prone bilaterally. Tender at L TFL, greater trochanter, IT Band (prox)    Sensation: Intact light touch sensation to BLE through L2-S2 dermatomal distribution    Functional Job Specific Testing:   Job Specific Task Job Demands Current Ability   1. Lifting Lifting mail Difficult/painful   2. Squatting, stooping Retrieving packages Difficult/painful   3. Standing Standing for long periods of time Difficult/unable        Treatment   Total Treatment time (time-based codes) separate from Evaluation: 8 minutes    therapeutic exercises to develop strength, endurance, ROM, flexibility, posture, and core stabilization for 8 minutes:  Patient education on nature of current condition and PHYSICAL THERAPY POC  Written HOME EXERCISE PROGRAM provided, reviewed, patient demo understanding   +Seated nerve glides x 10 reps  +TA activation with diaphragmatic breathing x 10 reps    Next:   +Walking warm-up  +Teach wall hip hinge and explain pressure in low back when bending/lifting with rounded back  +Elevated Sit to stand with hip hinge   +Supine gluteal squeezes-->bridges  +Supine lower trunk rotation  +Sahrmann level 1 if tolerable  +Dry needling?        Home Exercises and Patient Education Provided  Education provided:   - yes    Home Exercises Provided: yes.  Exercises were reviewed and Mary Alice was able to demonstrate them prior to the end of the session.  Mary Alice demonstrated fair  understanding of  the education provided.     Assessment     Mary Alice is a 59 y.o. referred to outpatient Physical Therapy with a medical diagnosis of  S39.012D (ICD-10-CM) - Strain of lumbar region, subsequent encounter , S39.012D (ICD-10-CM) - Strain of lumbar region, subsequent encounter. Pt presents with decreased ROM, muscle strength, flexibility, increased pain with mobility (associated with lumbar flexion, hip flexion) due to sciatic nerve tension and/or soft tissue restriction. Presents with L5-S1 referral pain pattern. She is currently on light duty at work with limited standing, has a high stool with no lumbar support that she utilizes as often as possible. Treatment based classification: Low back pain requiring symptom modulation first then addressing movement coordination deficit. Fear avoidance and/or guarding could limit progress but plan to gradually show patient that she is able move if performing movement appropriately (ie with bending, lifting).  The patient's current job specific task deficits include the following: painful, difficult squatting, stooping, standing for long periods and heavy lifting.    Patient prognosis: Fair.   Rehab potential: Fair to good    Patient will benefit from skilled outpatient Physical Therapy to address the deficits stated above and in the chart below, provide patient /family education, to maximize patient's level of independence, and to address work-related functional deficits for their job as a .     Plan of care discussed with patient: Yes  Patient's spiritual, cultural and educational needs considered and patient is agreeable to the plan of care and goals as stated below:     Anticipated Barriers for therapy: non-work related co-morbidities, chronicity of current injury, and fear of re-injury    Medical Necessity is demonstrated by the following  History  Co-morbidities and personal factors that may impact the plan of care [] LOW: no personal factors / co-morbidities  [x]  MODERATE: 1-2 personal factors / co-morbidities  [] HIGH: 3+ personal factors / co-morbidities    Moderate / High Support Documentation:   Co-morbidities affecting plan of care: Hx of     Personal Factors:   Lifestyle - sedentary outside of work  Fear of re-injury/fear avoidance     Examination  Body Structures and Functions, activity limitations and participation restrictions that may impact the plan of care [] LOW: addressing 1-2 elements  [x] MODERATE: 3+ elements  [] HIGH: 4+ elements (please support below)    Moderate / High Support Documentation: neural tension, strength deficits, impaired AROM, impaired functional tolerance, participation in community/work     Clinical Presentation [] LOW: stable  [x] MODERATE: Evolving  [] HIGH: Unstable     Decision Making/ Complexity Score: moderate       Goals:   Short Terms Goals: 6 visits     Goal  Progress  Date    (1)  Patient will be I with HOME EXERCISE PROGRAM  Initial, Not Met    (2)  Patient will be able to lift 15# from floor to waist level with appropriate technique x 10 reps.  Initial, Not Met      (3)   Patient will be able to stand for 30 mins or longer during her session with </=5/10 pain level, denoting improved tolerance to loading of spine.  Initial, Not Met        Long Term Goals: 12 visits    Goal  Progress  Date    (1)  Patient will be able to lift 20# or more from floor to waist level with appropriate technique x 10 reps. Initial, Not Met     (2)   Patient will carry 20# x  ft with good core activation with </=3/10 pain level.  Initial, Not Met     (3)   Patient will be able to stand for duration of session with </=3/10 pain level reported.  Initial, Not Met        Plan     Plan of care Certification: 2024 to end of 12 visits.    Outpatient Physical Therapy 2 times weekly for 12 visits total to include the following interventions: Cervical/Lumbar Traction, dry needling, Gait Training, Iontophoresis (with ), Manual Therapy, Moist  Heat/ Ice, Neuromuscular Re-ed, Patient Education, Self Care, Therapeutic Activities, and Therapeutic Exercise.     Upon discharge from further skilled PT intervention it will determined if the need for a work conditioning program or Functional Capacity Evaluation is required to allow the injured worker to return to work with full potential achieved, continued improvement with body mechanics with advanced functional activities, and further minimize future work-related injuries.     Physical therapist and physical therapy assistant(s) will met face to face to discuss patient's treatment plan and progress towards established goals. Pt will be seen by a physical therapist minimally every 6th visit or every 30 days.    Floresita Merida, PT, DPT  4/22/2024       I CERTIFY THE NEED FOR THESE SERVICES FURNISHED UNDER THIS PLAN OF TREATMENT AND WHILE UNDER MY CARE     Physician's comments:           Physician's Signature: ___________________________________________________

## 2024-04-22 PROBLEM — R29.898 IMPAIRED STRENGTH OF LOWER EXTREMITY: Status: ACTIVE | Noted: 2024-04-22

## 2024-04-22 PROBLEM — Z74.09 IMPAIRED FUNCTIONAL MOBILITY AND ACTIVITY TOLERANCE: Status: ACTIVE | Noted: 2024-04-22

## 2024-04-22 PROBLEM — M53.87 SCIATICA ASSOCIATED WITH DISORDER OF LUMBOSACRAL SPINE: Status: ACTIVE | Noted: 2024-04-22

## 2024-04-22 NOTE — PLAN OF CARE
"[facility]   Physical Therapy Initial Evaluation /treatment     Name: Mary Alice Garcia  Clinic Number: [id number]    Therapy Diagnosis:   Encounter Diagnoses   Name Primary?    Sciatica associated with disorder of lumbosacral spine Yes    Impaired strength of lower extremity     Impaired functional mobility and activity tolerance      Physician: Garrick Hernandez, JEAN PAUL    Physician Orders: PT Eval and Treat   Medical Diagnosis from Referral: S39.012D (ICD-10-CM) - Strain of lumbar region, subsequent encounter  S39.012D (ICD-10-CM) - Strain of lumbar region, subsequent encounter   CLAIM#[id number] DOI:[id number] LUMBAR INJURY  Auth Request Number: [id number]   Evaluation Date: 4/19/24  Authorization Period Expiration:  03/26/2024  Plan of Care Expiration: End of 12 visits  Visit # / Visits authorized: 1/ 12    Time In: 8:30 am (pt late)  Time Out: 9:07am  Total Billable Time: 37 minutes- 1 Eval (MCE), 1 TE      #1/3 4/19/24 43%   #2/3     #3/3         Precautions: Standard,  light duty at work    Subjective   Date of onset: 8/19/24  From MD visit:  Patient's place of employment -[employer]   Patient's job title -   Date of Injury - 8/19/2023Body part injured - L Lower Back  Current work status per last visit - Light Duty   Improved, same, or worse - Same   Pain Scale right now (1-10) -  6/10    Falls: none       Date of injury: 8/19/23  History of current condition -  Mary Alice reports: previous injury 2021 as well (similar).  Lifted a tray of mail overhead, felt a pull. Went to the emergency room immediately. At that time, extreme pain; they gave her medicine, recommended she stay home for three days. Now, gabapentin takes edge off. Light duty at this time, no lifting over 20 lbs, "sitting only". 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 She has a high stool that she utilizes as much as possible, no back support. Can't hold her urine since this injury, " experiencing urgency since her injury.  Laughing or sneezing is problematic as well. Laying on each side and on back, it hurts.     Prior medical treatment: gabapentin    Occupation/job title:   Job demands: lifting, bending, twisting, standing, walking, stooping/squatting    Current work restrictions: light duty (<20# lifting)  Previous work status: full duty, full-time  Current work status: light duty  Date last worked (if applicable): currently working.    Imaging: Imaging: EXAMINATION:  MRI LUMBAR SPINE WITHOUT CONTRAST     CLINICAL HISTORY:  Low back pain, progressive neurologic deficit; Radiculopathy, site unspecified     TECHNIQUE:  Multiplanar, multisequence MR images were acquired from the thoracolumbar junction to the sacrum without the administration of contrast.     COMPARISON:  Lumbar spine radiograph 08/22/2023     FINDINGS:  ALIGNMENT: Sagittal alignment is maintained.     BONE: No compression fractures.  No marrow replacing lesions.     Disc: Mild disc desiccation at L2-L3 and L5-S1. No bone marrow edema.     SPINAL CANAL: The conus medullaris has a normal appearance and terminates at the L1 level.  Cauda equina nerve roots are unremarkable.  No epidural mass or collection.     PARASPINAL SOFT TISSUES: Cholelithiasis.  The visualized portions of the common bile duct is within normal limits of caliber     SIGNIFICANT FINDINGS BY LEVEL:     T12-L1:  No spinal canal stenosis or neural foraminal narrowing.     L1-2: No spinal canal stenosis or neural foraminal narrowing.     L2-3: Minimal circumferential disc bulge.  No significant spinal canal stenosis or neural foraminal narrowing.     L3-4: Minimal bilateral facet arthropathy.  No spinal canal stenosis or neural foraminal narrowing.     L4-5: No spinal canal stenosis or neural foraminal narrowing.     L5-S1: Mild broad-based posterior disc bulge.  Posterior annular fissure.  No spinal canal stenosis or neural foraminal narrowing.      Impression:  No acute fracture or traumatic malalignment of the lumbar spine.  Mild degenerative changes including a posterior annular fissure at L5-S1.  No significant spinal canal stenosis or neural foraminal narrowing.   Cholelithiasis.    Social History: son lives with her, 29 y.o.   Prior Therapy: yes, right knee and back  Occupation:   Prior Level of Function: independent  Current Level of Function: independent difficulty tying shoes. Sits to do so. Stinging sensation when tries to do it by stooping over.     Pain:  Current 6/10, worst 8/10 (due to heavier work or moving more than normal), best 5/10   Location: left low back, posterior thigh and heel and lateral foot  Description: pulling, throbbing  Aggravating Factors: sitting, standing, squatting.   Easing Factors: laying    Pt's goals: Return to employment according to previous level of function, get back to 100%    Medical History:   Past Medical History:   Diagnosis Date    Arthritis     Depression     Hyperlipidemia     Hypertension     2013    Syncope and collapse     Thyroid disease     Tuberculosis        Surgical History:   Mary Alice Garcia  has a past surgical history that includes  section; Tonsillectomy; Tubal ligation; Knee surgery; Hemorrhoid surgery; thyroid removed; Esophagogastroduodenoscopy (2020); Esophagogastroduodenoscopy (N/A, 2020); Bunionectomy (Left); Colonoscopy (N/A, 10/25/2023); and Esophagogastroduodenoscopy (N/A, 10/25/2023).    Medications:   Mary Alice has a current medication list which includes the following prescription(s): acetaminophen, bupropion, calcium carbonate, fish oil-omega-3 fatty acids, gabapentin, levothyroxine, and propranolol.    Allergies:   Review of patient's allergies indicates:   Allergen Reactions    Paroxetine hcl Rash and Nausea And Vomiting        Objective      Posture:  Rounded shoulders  Lower Extremity Strength    Right Left   Hip flexion: 5/5 3/5   Hip extension: 4+/5 3-/5  !   Hip abduction: 5/5 3/5 !   Hip ER:  4-/5 5/5   Hip IR: 5/5 5/5   Knee extension: 5/5 4/5 pain guarding   Knee flexion: 5/5 4/5 pain guarding   Ankle dorsiflexion: 5/5 5/5   Great toe extension: 5/5 5/5       Special Tests:  - Slump (L  -Repeated Flexion: pain  -Repeated Ext: pain  -Piriformis Test: pain (L side)  -SLR neural tension test: positive L  Flexibility:   Ely's test: postive low back pain (L)    Palpation/joint mobilization: tender at L5-S1 P-A mobilization, tender at L gluteus medius. Aberrant motion with hip extension in prone bilaterally. Tender at L TFL, greater trochanter, IT Band (prox)    Sensation: Intact light touch sensation to BLE through L2-S2 dermatomal distribution    Functional Job Specific Testing:   Job Specific Task Job Demands Current Ability   1. Lifting Lifting mail Difficult/painful   2. Squatting, stooping Retrieving packages Difficult/painful   3. Standing Standing for long periods of time Difficult/unable        Treatment   Total Treatment time (time-based codes) separate from Evaluation: 8 minutes    therapeutic exercises to develop strength, endurance, ROM, flexibility, posture, and core stabilization for 8 minutes:  Patient education on nature of current condition and PHYSICAL THERAPY POC  Written HOME EXERCISE PROGRAM provided, reviewed, patient demo understanding   +Seated nerve glides x 10 reps  +TA activation with diaphragmatic breathing x 10 reps    Next:   +Walking warm-up  +Teach wall hip hinge and explain pressure in low back when bending/lifting with rounded back  +Elevated Sit to stand with hip hinge   +Supine gluteal squeezes-->bridges  +Supine lower trunk rotation  +Sahrmann level 1 if tolerable  +Dry needling?        Home Exercises and Patient Education Provided  Education provided:   - yes    Home Exercises Provided: yes.  Exercises were reviewed and Mary Alice was able to demonstrate them prior to the end of the session.  Mary Alice demonstrated fair  understanding of  the education provided.     Assessment     Mary Alice is a 59 y.o. referred to outpatient Physical Therapy with a medical diagnosis of  S39.012D (ICD-10-CM) - Strain of lumbar region, subsequent encounter , S39.012D (ICD-10-CM) - Strain of lumbar region, subsequent encounter. Pt presents with decreased ROM, muscle strength, flexibility, increased pain with mobility (associated with lumbar flexion, hip flexion) due to sciatic nerve tension and/or soft tissue restriction. Presents with L5-S1 referral pain pattern. She is currently on light duty at work with limited standing, has a high stool with no lumbar support that she utilizes as often as possible. Treatment based classification: Low back pain requiring symptom modulation first then addressing movement coordination deficit. Fear avoidance and/or guarding could limit progress but plan to gradually show patient that she is able move if performing movement appropriately (ie with bending, lifting).  The patient's current job specific task deficits include the following: painful, difficult squatting, stooping, standing for long periods and heavy lifting.    Patient prognosis: Fair.   Rehab potential: Fair to good    Patient will benefit from skilled outpatient Physical Therapy to address the deficits stated above and in the chart below, provide patient /family education, to maximize patient's level of independence, and to address work-related functional deficits for their job as a .     Plan of care discussed with patient: Yes  Patient's spiritual, cultural and educational needs considered and patient is agreeable to the plan of care and goals as stated below:     Anticipated Barriers for therapy: non-work related co-morbidities, chronicity of current injury, and fear of re-injury    Medical Necessity is demonstrated by the following  History  Co-morbidities and personal factors that may impact the plan of care [] LOW: no personal factors / co-morbidities  [x]  MODERATE: 1-2 personal factors / co-morbidities  [] HIGH: 3+ personal factors / co-morbidities    Moderate / High Support Documentation:   Co-morbidities affecting plan of care: Hx of     Personal Factors:   Lifestyle - sedentary outside of work  Fear of re-injury/fear avoidance     Examination  Body Structures and Functions, activity limitations and participation restrictions that may impact the plan of care [] LOW: addressing 1-2 elements  [x] MODERATE: 3+ elements  [] HIGH: 4+ elements (please support below)    Moderate / High Support Documentation: neural tension, strength deficits, impaired AROM, impaired functional tolerance, participation in community/work     Clinical Presentation [] LOW: stable  [x] MODERATE: Evolving  [] HIGH: Unstable     Decision Making/ Complexity Score: moderate       Goals:   Short Terms Goals: 6 visits     Goal  Progress  Date    (1)  Patient will be I with HOME EXERCISE PROGRAM  Initial, Not Met    (2)  Patient will be able to lift 15# from floor to waist level with appropriate technique x 10 reps.  Initial, Not Met      (3)   Patient will be able to stand for 30 mins or longer during her session with </=5/10 pain level, denoting improved tolerance to loading of spine.  Initial, Not Met        Long Term Goals: 12 visits    Goal  Progress  Date    (1)  Patient will be able to lift 20# or more from floor to waist level with appropriate technique x 10 reps. Initial, Not Met     (2)   Patient will carry 20# x  ft with good core activation with </=3/10 pain level.  Initial, Not Met     (3)   Patient will be able to stand for duration of session with </=3/10 pain level reported.  Initial, Not Met        Plan     Plan of care Certification: 2024 to end of 12 visits.    Outpatient Physical Therapy 2 times weekly for 12 visits total to include the following interventions: Cervical/Lumbar Traction, dry needling, Gait Training, Iontophoresis (with ), Manual Therapy, Moist  Heat/ Ice, Neuromuscular Re-ed, Patient Education, Self Care, Therapeutic Activities, and Therapeutic Exercise.     Upon discharge from further skilled PT intervention it will determined if the need for a work conditioning program or Functional Capacity Evaluation is required to allow the injured worker to return to work with full potential achieved, continued improvement with body mechanics with advanced functional activities, and further minimize future work-related injuries.     Physical therapist and physical therapy assistant(s) will met face to face to discuss patient's treatment plan and progress towards established goals. Pt will be seen by a physical therapist minimally every 6th visit or every 30 days.    Floresita Merida, PT, DPT  4/22/2024       I CERTIFY THE NEED FOR THESE SERVICES FURNISHED UNDER THIS PLAN OF TREATMENT AND WHILE UNDER MY CARE     Physician's comments:           Physician's Signature: ___________________________________________________

## 2024-04-24 ENCOUNTER — CLINICAL SUPPORT (OUTPATIENT)
Dept: REHABILITATION | Facility: HOSPITAL | Age: 59
End: 2024-04-24
Payer: OTHER GOVERNMENT

## 2024-04-24 DIAGNOSIS — M53.87 SCIATICA ASSOCIATED WITH DISORDER OF LUMBOSACRAL SPINE: Primary | ICD-10-CM

## 2024-04-24 DIAGNOSIS — Z74.09 IMPAIRED FUNCTIONAL MOBILITY AND ACTIVITY TOLERANCE: ICD-10-CM

## 2024-04-24 DIAGNOSIS — R29.898 IMPAIRED STRENGTH OF LOWER EXTREMITY: ICD-10-CM

## 2024-04-24 PROCEDURE — 97112 NEUROMUSCULAR REEDUCATION: CPT | Mod: PN

## 2024-04-24 PROCEDURE — 97014 ELECTRIC STIMULATION THERAPY: CPT | Mod: PN

## 2024-04-24 PROCEDURE — 97110 THERAPEUTIC EXERCISES: CPT | Mod: PN

## 2024-04-24 PROCEDURE — 97140 MANUAL THERAPY 1/> REGIONS: CPT | Mod: PN

## 2024-04-24 NOTE — PROGRESS NOTES
[facility]   Physical Therapy Treatment Note      Name: Mary Alice Garcia  [facility] Number: [id number]    Therapy Diagnosis:   Encounter Diagnoses   Name Primary?    Sciatica associated with disorder of lumbosacral spine Yes    Impaired strength of lower extremity     Impaired functional mobility and activity tolerance      Physician: Garrick Hernandez PA-C    Visit Date: 4/24/2024  Physician Orders: PT Eval and Treat   Medical Diagnosis from Referral: S39.012D (ICD-10-CM) - Strain of lumbar region, subsequent encounter  S39.012D (ICD-10-CM) - Strain of lumbar region, subsequent encounter   CLAIM#[id number] DOI:[id number] LUMBAR INJURY  Auth Request Number: [id number]   Evaluation Date: 4/19/24  Authorization Period Expiration:    03/26/2024  Plan of Care Expiration: End of 12 visits  Visit # / Visits authorized: 2/ 12   PTA Visit #: 0/5     Time In: 3:06 pm  Time Out:4:00 pm  Total Billable Time: 54 minutes-  1 TE, 1 NM, 2 MT, 1 estim        #1/3 4/19/24 43%   #2/3       #3/3            Precautions: Standard,  light duty at work    Date of onset: 8/19/24  From MD visit:  Patient's place of employment -[employer]   Patient's job title -   Date of Injury - 8/19/2023Body part injured - L Lower Back  Current work status per last visit - Light Duty   Subjective     Patient reports: moderate back and posterior thigh pain today . Took pain medication before session.   She was compliant with home exercise program.  Response to previous treatment: eval  Functional change: ongoing    Pain: 5/10  Location: left back , buttocks , and upper legs     Objective      Objective Measures updated at progress report unless specified.     Treatment     Mary Alice received the treatments listed below:      therapeutic exercises to develop strength, endurance, ROM, flexibility, posture, and core stabilization for 16 minutes including:  Supine gluteal squeezes-->bridges 2x10   Supine lower trunk rotation 20x, + 10 following  needling     next  Walking laps  Elevated Sit to stand with hip hinge   Teach wall hip hinge and explain pressure in low back when bending/lifting with rounded back       manual therapy techniques: Joint mobilizations, Myofacial release, and Soft tissue Mobilization were applied to the: low back for 23 minutes, including:  LAD left   Clearance of contraindications, written (see media) /verbal consent, education, benefits/risks functional dry needling   Functional dry needling to lumbar paraspinals and multifidi left 3-5 and and upper gluteal trigger points left in situ for 8 min with electrical stimulation applied. Following needles removed and placed in sharps container. No adverse effects.     neuromuscular re-education activities to improve: Coordination, Kinesthetic, Sense, Proprioception, and Posture for 15 minutes. The following activities were included:  Sahrmann level 1 2x10  Seated nerve glides x 10 reps  TA activation with diaphragmatic breathing x 10 reps       Patient Education and Home Exercises       Education provided:   - what to expect functional dry needling   -Continue HEP    Written Home Exercises Provided: Patient instructed to cont prior HEP. Exercises were reviewed and Mary Alice was able to demonstrate them prior to the end of the session.  Mary Alice demonstrated good  understanding of the education provided. See Electronic Medical Record under Patient Instructions for exercises provided during therapy sessions    Assessment     Mary Alice is a 59 y.o. referred to outpatient Physical Therapy with a medical diagnosis of  S39.012D (ICD-10-CM) - Strain of lumbar region, subsequent encounter , S39.012D (ICD-10-CM) - Strain of lumbar region, subsequent encounter.      Mary Alice Is progressing well towards her goals.   Patient prognosis is Fair.   Rehab potential: Fair to good      Patient will benefit from skilled outpatient Physical Therapy to address the deficits stated above and in the chart below, provide  patient /family education, to maximize patient's level of independence, and to address work-related functional deficits for their job as a .     Patient's spiritual, cultural and educational needs considered and pt agreeable to plan of care and goals.     Anticipated barriers to physical therapy: : non-work related co-morbidities, chronicity of current injury, and fear of re-injury     Goals:   Short Terms Goals: 6 visits      Goal  Progress  Date    (1)  Patient will be I with HOME EXERCISE PROGRAM  Initial, Not Met     (2)  Patient will be able to lift 15# from floor to waist level with appropriate technique x 10 reps.  Initial, Not Met      (3)   Patient will be able to stand for 30 mins or longer during her session with </=5/10 pain level, denoting improved tolerance to loading of spine.  Initial, Not Met         Long Term Goals: 12 visits     Goal  Progress  Date    (1)  Patient will be able to lift 20# or more from floor to waist level with appropriate technique x 10 reps. Initial, Not Met      (2)   Patient will carry 20# x  ft with good core activation with </=3/10 pain level.  Initial, Not Met     (3)   Patient will be able to stand for duration of session with </=3/10 pain level reported.  Initial, Not Met         Plan     Plan of care Certification: 4/19/2024 to end of 12 visits.     RANDI Barr, PT

## 2024-04-25 ENCOUNTER — HOSPITAL ENCOUNTER (OUTPATIENT)
Dept: RADIOLOGY | Facility: HOSPITAL | Age: 59
Discharge: HOME | End: 2024-04-25
Attending: ORTHOPAEDIC SURGERY
Payer: COMMERCIAL

## 2024-04-25 ENCOUNTER — OFFICE VISIT (OUTPATIENT)
Dept: ORTHOPEDICS | Facility: CLINIC | Age: 59
End: 2024-04-25
Payer: COMMERCIAL

## 2024-04-25 VITALS — BODY MASS INDEX: 27.57 KG/M2 | WEIGHT: 155.63 LBS | HEIGHT: 63 IN

## 2024-04-25 DIAGNOSIS — M17.11 PRIMARY OSTEOARTHRITIS OF RIGHT KNEE: Primary | ICD-10-CM

## 2024-04-25 DIAGNOSIS — M25.561 RIGHT KNEE PAIN, UNSPECIFIED CHRONICITY: Primary | ICD-10-CM

## 2024-04-25 DIAGNOSIS — M25.561 RIGHT KNEE PAIN, UNSPECIFIED CHRONICITY: ICD-10-CM

## 2024-04-25 PROCEDURE — 99203 OFFICE O/P NEW LOW 30 MIN: CPT | Mod: S$GLB,,, | Performed by: ORTHOPAEDIC SURGERY

## 2024-04-25 PROCEDURE — 3008F BODY MASS INDEX DOCD: CPT | Mod: CPTII,S$GLB,, | Performed by: ORTHOPAEDIC SURGERY

## 2024-04-25 PROCEDURE — 3044F HG A1C LEVEL LT 7.0%: CPT | Mod: CPTII,S$GLB,, | Performed by: ORTHOPAEDIC SURGERY

## 2024-04-25 PROCEDURE — 99999 PR PBB SHADOW E&M-EST. PATIENT-LVL III: CPT | Mod: PBBFAC,,, | Performed by: ORTHOPAEDIC SURGERY

## 2024-04-25 PROCEDURE — 73564 X-RAY EXAM KNEE 4 OR MORE: CPT | Mod: 26,RT,, | Performed by: RADIOLOGY

## 2024-04-25 PROCEDURE — 73564 X-RAY EXAM KNEE 4 OR MORE: CPT | Mod: TC,PN,RT

## 2024-04-25 PROCEDURE — 1159F MED LIST DOCD IN RCRD: CPT | Mod: CPTII,S$GLB,, | Performed by: ORTHOPAEDIC SURGERY

## 2024-04-25 RX ORDER — DICLOFENAC SODIUM 10 MG/G
2 GEL TOPICAL 3 TIMES DAILY
Qty: 100 G | Refills: 2 | Status: SHIPPED | OUTPATIENT
Start: 2024-04-25 | End: 2024-05-24 | Stop reason: SDUPTHER

## 2024-04-25 NOTE — PROGRESS NOTES
[facility], Orthopedics and Sports Medicine  [facility]    New Patient Office Visit  2024     Subjective:      Mary Alice Garcia is a 59 y.o. female referred by Aaareferral Self for evaluation and treatment of right knee pain..  This is evaluated as a personal injury.   The patient has the following symptoms: pain located globally about the knee .  Also complains of swelling right knee.  The symptoms began several years ago and are worsening.    The patient had prior right knee arthroscopy x2 by another provider.     The patient works [employer].     Outside reports reviewed: historical medical records, office notes, and radiology reports.    Past Medical History:   Diagnosis Date    Arthritis     Depression     Hyperlipidemia     Hypertension     2013    Syncope and collapse     Thyroid disease     Tuberculosis        Patient Active Problem List   Diagnosis    Hypothyroid    GERD (gastroesophageal reflux disease)    Traumatic incomplete tear of left rotator cuff    Overweight (BMI 25.0-29.9)    Essential hypertension    Palpitations    Chest pain of uncertain etiology    Dyspnea on exertion    Sinus tachycardia    Family history of breast cancer in first degree relative    Menopausal symptoms    Pap smear of cervix shows high risk HPV present, type 18    Hearing loss of right ear due to cerumen impaction    Upper respiratory tract infection    Abnormal uterine bleeding    Sciatica associated with disorder of lumbosacral spine    Impaired strength of lower extremity    Impaired functional mobility and activity tolerance       Past Surgical History:   Procedure Laterality Date    BUNIONECTOMY Left      SECTION      COLONOSCOPY N/A 10/25/2023    Procedure: COLONOSCOPY;  Surgeon: Gadiel De MD;  Location: [facility];  Service: Endoscopy;  Laterality: N/A;    ESOPHAGOGASTRODUODENOSCOPY  2020    ESOPHAGOGASTRODUODENOSCOPY N/A 2020    Procedure: EGD  (ESOPHAGOGASTRODUODENOSCOPY);  Surgeon: Rio Galeano MD;  Location: [facility] ENDO;  Service: Endoscopy;  Laterality: N/A;    ESOPHAGOGASTRODUODENOSCOPY N/A 10/25/2023    Procedure: EGD (ESOPHAGOGASTRODUODENOSCOPY);  Surgeon: Gadiel De MD;  Location: [facility] ENDO;  Service: Endoscopy;  Laterality: N/A;    HEMORRHOID SURGERY      KNEE SURGERY      right knee    thyroid removed      TONSILLECTOMY      TUBAL LIGATION          Current Outpatient Medications   Medication Instructions    acetaminophen (TYLENOL EXTRA STRENGTH) 1,000 mg, Oral, Every 6 hours PRN    buPROPion (WELLBUTRIN XL) 150 mg, Oral, Daily    calcium carbonate (OS-FLO) 500 mg calcium (1,250 mg) tablet 2 tablets, 2 times daily    diclofenac sodium (VOLTAREN) 2 g, Topical (Top), 3 times daily    fish oil-omega-3 fatty acids 300-1,000 mg capsule 2 g, Oral, Daily    gabapentin (NEURONTIN) 600 mg, Oral, 3 times daily    levothyroxine (SYNTHROID) 137 mcg, Oral    propranoloL (INDERAL) 60 mg, Oral, 2 times daily        Review of patient's allergies indicates:   Allergen Reactions    Paroxetine hcl Rash and Nausea And Vomiting       Social History     Socioeconomic History    Marital status:    Tobacco Use    Smoking status: Never     Passive exposure: Never    Smokeless tobacco: Never   Substance and Sexual Activity    Alcohol use: Yes     Alcohol/week: 8.0 standard drinks of alcohol     Types: 3 Glasses of wine, 5 Cans of beer per week    Drug use: No    Sexual activity: Not Currently     Partners: Male     Birth control/protection: Condom, See Surgical Hx       Family History   Problem Relation Name Age of Onset    Breast cancer Mother Willie Mae Brown Cooks     Cancer Mother Willie Mae Brown Cooks         breast cancer    Hypertension Mother Willie Mae Brown Cooks     Hyperlipidemia Father      Breast cancer Sister      Hypertension Brother Rio Jose     Diabetes Maternal Grandmother Ellen Lopez     Diabetes Paternal Grandmother  Allie Pressley     Cancer Maternal Aunt Marleni Vega     Drug abuse Maternal Aunt Araceli Garcia          Review of Systems   Constitutional: Negative for chills and fever.   HENT:  Negative for hearing loss.    Eyes:  Negative for blurred vision.   Cardiovascular:  Negative for chest pain.   Respiratory:  Negative for shortness of breath.    Gastrointestinal:  Negative for abdominal pain.   Neurological:  Negative for light-headedness.          Objective:      General    Constitutional: She is oriented to person, place, and time. She appears well-developed and well-nourished.   HENT:   Head: Normocephalic and atraumatic.   Eyes: EOM are normal.   Cardiovascular:  Normal rate.            Pulmonary/Chest: Effort normal.   Neurological: She is alert and oriented to person, place, and time.   Psychiatric: She has a normal mood and affect.           Right Knee Exam     Inspection   Erythema: absent  Swelling: absent  Effusion: absent    Tenderness   The patient is tender to palpation of the medial joint line and lateral joint line.    Crepitus   The patient has crepitus of the patella.    Range of Motion   Extension:  0   Flexion:  120     Tests   Ligament Examination   Lachman: normal (-1 to 2mm)   PCL-Posterior Drawer: normal (0 to 2mm)     MCL - Valgus: normal (0 to 2mm)  LCL - Varus: normal    Other   Sensation: normal    Left Knee Exam     Inspection   Erythema: absent  Swelling: absent  Effusion: absent    Tenderness   The patient is experiencing no tenderness.     Range of Motion   Extension:  0   Flexion:  130     Tests   Meniscus   Angélica:  Medial - negative Lateral - negative  Stability   Lachman: normal (-1 to 2mm)   PCL-Posterior Drawer: normal (0 to 2mm)  MCL - Valgus: normal (0 to 2mm)  LCL - Varus: normal (0 to 2mm)    Other   Sensation: normal    Muscle Strength   Right Lower Extremity   Quadriceps:  5/5   Hamstrin/5   Left Lower Extremity   Quadriceps:  5/5   Hamstrin/5     Vascular Exam      Right Pulses    Posterior Tibial:      2+        Left Pulses    Posterior Tibial:      2+          Imaging:  Radiographs of the right knee taken taken 04/25/2024 were personally reviewed from the [facility].  Multiple views of the knee are available today for review, including a standing AP, a standing notch view, lateral view, and a merchant view.  The tibiofemoral compartment demonstrates severe degenerative changes.  The patellofemoral compartment demonstrates severe degenerative changes.  No acute fractures or dislocations are noted in these images.        Procedures        Assessment:       Mary Alice Garcia is a 59 y.o. female seen in the office today. The encounter diagnosis was Primary osteoarthritis of right knee.  Non-operative treatment is recommended at this time. The patient had prior knee arthroscopy and now has osteoarthritis that is severe.  I discussed surgical and nonsurgical options.  She wants to avoid surgery at this time.  Offered CSI but declined at this time and wants to try topical NSAID instead.  Discussed trial of celebrex but she will discuss with her PCP for this medication. Will send patient for therapy to improve functional status.  The natural history and expected course discussed with patient. Various treatment options were discussed, including their risks and benefits. All of the patient's questions were answered.     Plan:      Physical therapy and rehabilitation treatment.  Tylenol 650mg TID, PRN pain.  Voltaren 1% topical gel, apply to affected area TID, PRN pain.  Follow up in 6 weeks.           Pop Bee IV, MD   of Clinical Orthopedics  Department of Orthopedic Surgery  [facility]  Office: [phone]  Website: [email or web address]      Orders Placed This Encounter    Ambulatory referral/consult to Physical/Occupational Therapy    diclofenac sodium (VOLTAREN) 1 % Gel

## 2024-04-25 NOTE — LETTER
April 25, 2024    Mary Alice Garcia  [address]  [place] [address]         [facility]  [address]  [address]  [place] [address]  Phone: [phone]  Fax: [phone] April 25, 2024     Patient: Mary Alice Garcia   YOB: 1965   Date of Visit: 4/25/2024       To Whom It May Concern:    It is my medical opinion that Mary Alice Garcia may return to full duty 4/29/2024 with no restrictions.    If you have any questions or concerns, please don't hesitate to call.    Sincerely,          Pop Bee IV, MD

## 2024-05-01 ENCOUNTER — CLINICAL SUPPORT (OUTPATIENT)
Dept: REHABILITATION | Facility: HOSPITAL | Age: 59
End: 2024-05-01
Payer: OTHER GOVERNMENT

## 2024-05-01 ENCOUNTER — OFFICE VISIT (OUTPATIENT)
Dept: URGENT CARE | Facility: CLINIC | Age: 59
End: 2024-05-01
Payer: OTHER GOVERNMENT

## 2024-05-01 VITALS
OXYGEN SATURATION: 99 % | SYSTOLIC BLOOD PRESSURE: 111 MMHG | TEMPERATURE: 98 F | DIASTOLIC BLOOD PRESSURE: 72 MMHG | BODY MASS INDEX: 27.57 KG/M2 | RESPIRATION RATE: 16 BRPM | HEART RATE: 65 BPM | HEIGHT: 63 IN | WEIGHT: 155.63 LBS

## 2024-05-01 DIAGNOSIS — M53.87 SCIATICA ASSOCIATED WITH DISORDER OF LUMBOSACRAL SPINE: Primary | ICD-10-CM

## 2024-05-01 DIAGNOSIS — Z74.09 IMPAIRED FUNCTIONAL MOBILITY AND ACTIVITY TOLERANCE: ICD-10-CM

## 2024-05-01 DIAGNOSIS — Z02.6 ENCOUNTER RELATED TO WORKER'S COMPENSATION CLAIM: ICD-10-CM
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
## 2024-05-01 DIAGNOSIS — G89.29 CHRONIC LEFT-SIDED LOW BACK PAIN WITH LEFT-SIDED SCIATICA: ICD-10-CM

## 2024-05-01 DIAGNOSIS — M54.42 CHRONIC LEFT-SIDED LOW BACK PAIN WITH LEFT-SIDED SCIATICA: ICD-10-CM

## 2024-05-01 DIAGNOSIS — M51.36 ANNULAR TEAR OF LUMBAR DISC: ICD-10-CM

## 2024-05-01 DIAGNOSIS — S39.012D STRAIN OF LUMBAR REGION, SUBSEQUENT ENCOUNTER: Primary | ICD-10-CM

## 2024-05-01 DIAGNOSIS — R29.898 IMPAIRED STRENGTH OF LOWER EXTREMITY: ICD-10-CM

## 2024-05-01 PROCEDURE — 97140 MANUAL THERAPY 1/> REGIONS: CPT | Mod: PN

## 2024-05-01 PROCEDURE — 99213 OFFICE O/P EST LOW 20 MIN: CPT | Mod: S$GLB,,, | Performed by: PHYSICIAN ASSISTANT

## 2024-05-01 PROCEDURE — 97112 NEUROMUSCULAR REEDUCATION: CPT | Mod: PN

## 2024-05-01 PROCEDURE — 97110 THERAPEUTIC EXERCISES: CPT | Mod: PN

## 2024-05-01 NOTE — LETTER
[facility] [address] [address]  Phone: [phone]  Fax: [phone]  [facility]: [phone]    Pt Name: Mary Alice Garcia  Injury Date: 08/01/2012   Employee ID: [id number] Date of Treatment: 05/01/2024   Company: [employer]      Appointment Time: 07:45 AM Arrived: 8:00 AM   Provider: Garrick Hernandez PA-C Time Out: 9;18 AM     Office Treatment:   1. Strain of lumbar region, subsequent encounter    2. Encounter related to worker's compensation claim    3. Chronic left-sided low back pain with left-sided sciatica    4. Annular tear of lumbar disc    5. Work related injury          Patient Instructions: Attention not to aggravate affected area, Daily home exercises/warm soaks, Continue Physical Therapy, Referral to specialist to be scheduled, once authorized      Restrictions:  (See CA-17)     Return Appointment: 6/5/2024 at 9:30 AM    TABATHA

## 2024-05-01 NOTE — PROGRESS NOTES
[facility]   Physical Therapy Treatment Note      Name: Mary Alice Garcia  [employer] Number: [id number]    Therapy Diagnosis:   Encounter Diagnoses   Name Primary?    Sciatica associated with disorder of lumbosacral spine Yes    Impaired strength of lower extremity     Impaired functional mobility and activity tolerance        Physician: Garrick Hernandez PA-C    Visit Date: 5/1/2024  Physician Orders: PT Eval and Treat   Medical Diagnosis from Referral: S39.012D (ICD-10-CM) - Strain of lumbar region, subsequent encounter  S39.012D (ICD-10-CM) - Strain of lumbar region, subsequent encounter   CLAIM#[id number] DOI:[id number] LUMBAR INJURY  Auth Request Number: [id number]   Evaluation Date: 4/19/24  Authorization Period Expiration:    03/26/2024  Plan of Care Expiration: End of 12 visits  Visit # / Visits authorized: 3/ 12  Cx/no show: 2   PTA Visit #: 0/5     Time In:11:03 am  Time Out:11:56 pm  Total Billable Time: 53 minutes-  1 TE, 2 NM, 1 MT        #1/3 4/19/24 43%   #2/3       #3/3         Precautions: Standard,  light duty at work    Date of onset: 8/19/24  From MD visit:  Patient's place of employment -[employer]   Patient's job title -   Date of Injury - 8/19/2023Body part injured - L Lower Back  Current work status per last visit - Light Duty   Subjective     Patient reports: did not take mediation prior to session. Just saw  and is putting in referral to pain management. 2 days relief after last session. Does not want to do dry needling today.  Missed last appointment because she was called into work early.   She was compliant with home exercise program.  Response to previous treatment: decreased pain 2 days   Functional change: ongoing    Pain: 6/10  Location: left back , buttocks , and upper legs     Objective      Objective Measures updated at progress report unless specified.     Treatment     Mary Alice received the treatments listed below:      therapeutic exercises to develop  strength, endurance, ROM, flexibility, posture, and core stabilization for 18 minutes including:  Supine gluteal squeezes-->bridges 2x10 green theraband   Supine lower trunk rotation 20x  +DKTC green peanut 2'  +wall hip hinge 2x10 with 2 walking laps between sets  +Sit to stand with hip hinge     Teach bending/lifting with rounded back    manual therapy techniques: Joint mobilizations, Myofacial release, and Soft tissue Mobilization were applied to the: low back for 10 minutes, including:  LAD left   Not today   Clearance of contraindications, written (see media) /verbal consent, education, benefits/risks functional dry needling   Functional dry needling to lumbar paraspinals and multifidi left 3-5 and and upper gluteal trigger points left in situ for 0 min with electrical stimulation applied. Following needles removed and placed in sharps container. No adverse effects.     neuromuscular re-education activities to improve: Coordination, Kinesthetic, Sense, Proprioception, and Posture for 25 minutes. The following activities were included:  TA activation with diaphragmatic breathing x 10 reps   Sahrmann level 1 2x10  Sahrmann level 3 1x10    Seated nerve glides 2x15 reps bilateral   Hooklying transverse abdominus activation SAPD 15x green theraband     Patient Education and Home Exercises       Education provided:   - what to expect functional dry needling   -Continue HEP    Written Home Exercises Provided: Patient instructed to cont prior HEP. Exercises were reviewed and Mary Alice was able to demonstrate them prior to the end of the session.  Mary Alice demonstrated good  understanding of the education provided. See Electronic Medical Record under Patient Instructions for exercises provided during therapy sessions    Assessment     Mary Alice is a 59 y.o. referred to outpatient Physical Therapy with a medical diagnosis of  S39.012D (ICD-10-CM) - Strain of lumbar region, subsequent encounter , S39.012D (ICD-10-CM) - Strain of  lumbar region, subsequent encounter.  Patient responded well to prior session with 2 days of relief. Continued with LAD with good response. Progressed core series with cuing for coordination of task. Implemented body mechanics training with education on hip hinging for bending and lifting and sit to stand. Patient presented following urgent care WC follow up and is to continue with light duty at work. Continue to progress as tolerated.     Mary Alice Is progressing well towards her goals.   Patient prognosis is Fair.   Rehab potential: Fair to good      Patient will benefit from skilled outpatient Physical Therapy to address the deficits stated above and in the chart below, provide patient /family education, to maximize patient's level of independence, and to address work-related functional deficits for their job as a .     Patient's spiritual, cultural and educational needs considered and pt agreeable to plan of care and goals.     Anticipated barriers to physical therapy: : non-work related co-morbidities, chronicity of current injury, and fear of re-injury     Goals:   Short Terms Goals: 6 visits      Goal  Progress  Date    (1)  Patient will be I with HOME EXERCISE PROGRAM  Initial, Not Met     (2)  Patient will be able to lift 15# from floor to waist level with appropriate technique x 10 reps.  Initial, Not Met      (3)   Patient will be able to stand for 30 mins or longer during her session with </=5/10 pain level, denoting improved tolerance to loading of spine.  Initial, Not Met         Long Term Goals: 12 visits     Goal  Progress  Date    (1)  Patient will be able to lift 20# or more from floor to waist level with appropriate technique x 10 reps. Initial, Not Met      (2)   Patient will carry 20# x  ft with good core activation with </=3/10 pain level.  Initial, Not Met     (3)   Patient will be able to stand for duration of session with </=3/10 pain level reported.  Initial, Not Met          Plan     Plan of care Certification: 4/19/2024 to end of 12 visits.     RANDI Barr, PT

## 2024-05-01 NOTE — PROGRESS NOTES
Subjective:      Patient ID: Mary Alice Garcia is a 59 y.o. female.    Chief Complaint: Back Pain (LOWER BACK)    Patient's place of employment - [employer]  Patient's job title -   Date of Injury - 8/19/2023  Body part injured - LOWER BACK  Current work status per last visit - REGULAR DUTY  Improved, same, or worse - IMPROVED /SAME. STARTED PT  Pain Scale right now (1-10) -  6/10      KSD    Provider note:   Patient presents for follow-up low back injury.  She has chronic low back pain that was exacerbated by her work-related activity last August.  She continues to have moderate low back pain with intermittent radiation to the left thigh.  Patient states she was scheduled for pain management however when she arrived at the pain management Clinic she was told that they were going to see her under her personal insurance and not worker's comp.  She canceled and left the facility without being seen.  She did start physical therapy finally and states that it is helping somewhat relieve her low back pain.  She says the needling helps.  She is taking gabapentin and Tylenol for pain.  She is currently working light duty. [employer]    Back Pain  Pertinent negatives include no numbness.     Constitution: Positive for activity change.   Musculoskeletal:  Positive for pain and back pain.   Neurological:  Negative for numbness and tingling.     Objective:     Physical Exam  Vitals and nursing note reviewed.   Constitutional:       General: She is not in acute distress.     Appearance: Normal appearance. She is well-developed.   HENT:      Head: Normocephalic and atraumatic.      Right Ear: Hearing and external ear normal.      Left Ear: Hearing and external ear normal.      Nose: Nose normal. No nasal deformity.   Eyes:      General: Lids are normal.      Conjunctiva/sclera: Conjunctivae normal.      Right eye: Right conjunctiva is not injected.      Left eye: Left conjunctiva is not injected.   Neck:      Trachea: Trachea normal.    Pulmonary:      Effort: Pulmonary effort is normal. No respiratory distress.      Breath sounds: No stridor.   Musculoskeletal:      Cervical back: Normal and normal range of motion. No spinous process tenderness or muscular tenderness.      Thoracic back: Normal.      Lumbar back: Tenderness present. No deformity. Decreased range of motion (Flexion to 60° at the waist). Positive left straight leg raise test. Negative right straight leg raise test.        Back:    Skin:     General: Skin is warm and dry.      Findings: No abrasion or bruising.   Neurological:      General: No focal deficit present.      Mental Status: She is alert.   Psychiatric:         Attention and Perception: She is attentive.         Speech: Speech normal.         Behavior: Behavior normal.         Thought Content: Thought content normal.        Assessment:      1. Strain of lumbar region, subsequent encounter    2. Encounter related to worker's compensation claim    3. Chronic left-sided low back pain with left-sided sciatica    4. Annular tear of lumbar disc    5. Work related injury      Plan:     Patient has an order for pain management that was authorized 02/14/2024.  I will reach out to [facility] to help coordinate getting her into pain management.  In the meantime continue home exercises, warm soaks and physical therapy.  Continue gabapentin and acetaminophen as needed for pain.  Patient will continue modified duty and return to clinic in 5 weeks for reassessment.  Patient verbalized understanding and agreement.     Patient Instructions: Attention not to aggravate affected area, Daily home exercises/warm soaks, Continue Physical Therapy, Referral to specialist to be scheduled, once authorized   Restrictions:  (See CA-17)  Follow up in about 5 weeks (around 6/5/2024).

## 2024-05-08 ENCOUNTER — CLINICAL SUPPORT (OUTPATIENT)
Dept: REHABILITATION | Facility: HOSPITAL | Age: 59
End: 2024-05-08
Payer: OTHER GOVERNMENT

## 2024-05-08 DIAGNOSIS — M53.87 SCIATICA ASSOCIATED WITH DISORDER OF LUMBOSACRAL SPINE: Primary | ICD-10-CM

## 2024-05-08 DIAGNOSIS — Z74.09 IMPAIRED FUNCTIONAL MOBILITY AND ACTIVITY TOLERANCE: ICD-10-CM

## 2024-05-08 DIAGNOSIS — R29.898 IMPAIRED STRENGTH OF LOWER EXTREMITY: ICD-10-CM

## 2024-05-08 PROCEDURE — 97530 THERAPEUTIC ACTIVITIES: CPT | Mod: PN,CQ

## 2024-05-08 PROCEDURE — 97112 NEUROMUSCULAR REEDUCATION: CPT | Mod: PN,CQ

## 2024-05-08 PROCEDURE — 97140 MANUAL THERAPY 1/> REGIONS: CPT | Mod: PN,CQ

## 2024-05-08 PROCEDURE — 97110 THERAPEUTIC EXERCISES: CPT | Mod: PN,CQ

## 2024-05-08 NOTE — PROGRESS NOTES
[facility]   Physical Therapy Treatment Note      Name: Mary Alice Garcia  [facility] Number: [id number]    Therapy Diagnosis:   Encounter Diagnoses   Name Primary?    Sciatica associated with disorder of lumbosacral spine Yes    Impaired strength of lower extremity     Impaired functional mobility and activity tolerance          Physician: Garrick Hernandez PA-C    Visit Date: 5/8/2024  Physician Orders: PT Eval and Treat   Medical Diagnosis from Referral: S39.012D (ICD-10-CM) - Strain of lumbar region, subsequent encounter  S39.012D (ICD-10-CM) - Strain of lumbar region, subsequent encounter   CLAIM#[id number] DOI:[id number] LUMBAR INJURY  Auth Request Number: [id number]   Evaluation Date: 4/19/24  Authorization Period Expiration:    03/26/2024  Plan of Care Expiration: End of 12 visits  Visit # / Visits authorized: 4/ 12  Cx/no show: 2   PTA Visit #: 1/5     Time In: 11:01 am  Time Out:11:56 pm  Total Billable Time: 55 minutes-  1 TE, 2 NM, 1 MT, 1TA        #1/3 4/19/24 43%   #2/3       #3/3         Precautions: Standard,  light duty at work    Date of onset: 8/19/24  From MD visit:  Patient's place of employment -[employer]   Patient's job title -   Date of Injury - 8/19/2023Body part injured - L Lower Back  Current work status per last visit - Light Duty   Subjective     Patient reports: Tolerating light duty well at work.  She was compliant with home exercise program.  Response to previous treatment: decreased pain 2 days   Functional change: ongoing    Pain: 6/10  Location: left back , buttocks , and upper legs     Objective      Objective Measures updated at progress report unless specified.     Treatment     Mary Alice received the treatments listed below:      therapeutic exercises to develop strength, endurance, ROM, flexibility, posture, and core stabilization for 10 minutes including:  Supine bridge over peanut 2 x 10 ->bridges 2x10 green theraband   Supine lower trunk rotation 20x  DKTC  green peanut 2'    manual therapy techniques: Joint mobilizations, Myofacial release, and Soft tissue Mobilization were applied to the: low back for 10 minutes, including:  LAD left   Not today   Clearance of contraindications, written (see media) /verbal consent, education, benefits/risks functional dry needling   Functional dry needling to lumbar paraspinals and multifidi left 3-5 and and upper gluteal trigger points left in situ for 0 min with electrical stimulation applied. Following needles removed and placed in sharps container. No adverse effects.     neuromuscular re-education activities to improve: Coordination, Kinesthetic, Sense, Proprioception, and Posture for 25 minutes. The following activities were included:  TA activation with diaphragmatic breathing x 10 reps   Sahrmann level 1 2x10  Sahrmann level 3 1x10    Seated nerve glides 2x15 reps bilateral   Hooklying transverse abdominus activation SAPD 15x green theraband     therapeutic activities to improve functional performance for 10  minutes, including:  wall hip hinge 2x10 with 2 walking laps between sets  Sit to stand with hip hinge     Teach bending/lifting with rounded back    Patient Education and Home Exercises       Education provided:   - what to expect functional dry needling   -Continue HEP    Written Home Exercises Provided: Patient instructed to cont prior HEP. Exercises were reviewed and Mary Alice was able to demonstrate them prior to the end of the session.  Mary Alice demonstrated good  understanding of the education provided. See Electronic Medical Record under Patient Instructions for exercises provided during therapy sessions    Assessment     Mary Alice is a 59 y.o. referred to outpatient Physical Therapy with a medical diagnosis of  S39.012D (ICD-10-CM) - Strain of lumbar region, subsequent encounter , S39.012D (ICD-10-CM) - Strain of lumbar region, subsequent encounter.  Patient tolerating light duty well at work. Continues to presents with  moderate level lower back pain. Focused treatment on core series and  body mechanics training for bending and lifting. Noted lordosis with hip hinge that required frequent cuing to reduce. Will need further review at next session. Continue to progress as tolerated.     Mary Alice Is progressing well towards her goals.   Patient prognosis is Fair.   Rehab potential: Fair to good      Patient will benefit from skilled outpatient Physical Therapy to address the deficits stated above and in the chart below, provide patient /family education, to maximize patient's level of independence, and to address work-related functional deficits for their job as a .     Patient's spiritual, cultural and educational needs considered and pt agreeable to plan of care and goals.     Anticipated barriers to physical therapy: : non-work related co-morbidities, chronicity of current injury, and fear of re-injury     Goals:   Short Terms Goals: 6 visits      Goal  Progress  Date    (1)  Patient will be I with HOME EXERCISE PROGRAM  Initial, Not Met     (2)  Patient will be able to lift 15# from floor to waist level with appropriate technique x 10 reps.  Initial, Not Met      (3)   Patient will be able to stand for 30 mins or longer during her session with </=5/10 pain level, denoting improved tolerance to loading of spine.  Initial, Not Met         Long Term Goals: 12 visits     Goal  Progress  Date    (1)  Patient will be able to lift 20# or more from floor to waist level with appropriate technique x 10 reps. Initial, Not Met      (2)   Patient will carry 20# x  ft with good core activation with </=3/10 pain level.  Initial, Not Met     (3)   Patient will be able to stand for duration of session with </=3/10 pain level reported.  Initial, Not Met         Plan     Plan of care Certification: 4/19/2024 to end of 12 visits.     Mary Kate Abraham, PTA

## 2024-05-10 ENCOUNTER — CLINICAL SUPPORT (OUTPATIENT)
Dept: REHABILITATION | Facility: HOSPITAL | Age: 59
End: 2024-05-10
Payer: OTHER GOVERNMENT

## 2024-05-10 DIAGNOSIS — Z74.09 IMPAIRED FUNCTIONAL MOBILITY AND ACTIVITY TOLERANCE: ICD-10-CM

## 2024-05-10 DIAGNOSIS — M53.87 SCIATICA ASSOCIATED WITH DISORDER OF LUMBOSACRAL SPINE: Primary | ICD-10-CM

## 2024-05-10 DIAGNOSIS — R29.898 IMPAIRED STRENGTH OF LOWER EXTREMITY: ICD-10-CM

## 2024-05-10 PROCEDURE — 97112 NEUROMUSCULAR REEDUCATION: CPT | Mod: PN,CQ

## 2024-05-10 PROCEDURE — 97140 MANUAL THERAPY 1/> REGIONS: CPT | Mod: PN,CQ

## 2024-05-10 PROCEDURE — 97110 THERAPEUTIC EXERCISES: CPT | Mod: PN,CQ

## 2024-05-10 NOTE — PROGRESS NOTES
[facility]   Physical Therapy Treatment Note      Name: Mary Alice Garcia  [facility] Number: [id number]    Therapy Diagnosis:   Encounter Diagnoses   Name Primary?    Sciatica associated with disorder of lumbosacral spine Yes    Impaired strength of lower extremity     Impaired functional mobility and activity tolerance            Physician: Garrick Hernandez PA-C    Visit Date: 5/10/2024  Physician Orders: PT Eval and Treat   Medical Diagnosis from Referral: S39.012D (ICD-10-CM) - Strain of lumbar region, subsequent encounter  S39.012D (ICD-10-CM) - Strain of lumbar region, subsequent encounter   CLAIM#[id number] DOI:[id number] LUMBAR INJURY  Auth Request Number: [id number]   Evaluation Date: 4/19/24  Authorization Period Expiration:    03/26/2024  Plan of Care Expiration: End of 12 visits  Visit # / Visits authorized: 5/ 12  Cx/no show: 2   PTA Visit #: 2/5     Time In: 7:07 am  Time Out: 7:41 pm  Total Billable Time: 34 minutes-  1 TE, 1 NM, 1 MT        #1/3 4/19/24 43%   #2/3       #3/3         Precautions: Standard,  light duty at work    Date of onset: 8/19/24  From MD visit:  Patient's place of employment -[employer]   Patient's job title -   Date of Injury - 8/19/2023Body part injured - L Lower Back  Current work status per last visit - Light Duty   Subjective     Patient reports: No new complaints. Pain a little better today.   She was compliant with home exercise program.  Response to previous treatment: decreased pain 2 days   Functional change: ongoing    Pain: 5/10  Location: left back , buttocks , and upper legs     Objective      Objective Measures updated at progress report unless specified.     Treatment     Mary Alice received the treatments listed below:      therapeutic exercises to develop strength, endurance, ROM, flexibility, posture, and core stabilization for 10 minutes including:  Supine bridge over peanut 2 x 10 ->bridges 2x10 green theraband   Supine lower trunk rotation  20x  DKTC green peanut 2'     manual therapy techniques: Joint mobilizations, Myofacial release, and Soft tissue Mobilization were applied to the: low back for 10 minutes, including:  LAD left   Not today   Clearance of contraindications, written (see media) /verbal consent, education, benefits/risks functional dry needling   Functional dry needling to lumbar paraspinals and multifidi left 3-5 and and upper gluteal trigger points left in situ for 0 min with electrical stimulation applied. Following needles removed and placed in sharps container. No adverse effects.     neuromuscular re-education activities to improve: Coordination, Kinesthetic, Sense, Proprioception, and Posture for 14 minutes. The following activities were included:  TA activation with diaphragmatic breathing x 10 reps   Sahrmann level 1 2x10  Sahrmann level 3 1x10  NP  Seated nerve glides 2x15 reps bilateral   Hooklying transverse abdominus activation SAPD 15x green theraband     therapeutic activities to improve functional performance for 00 minutes, including:  wall hip hinge 2x10 with 2 walking laps between sets NP  Sit to stand with hip hinge held due to pain    Teach bending/lifting with rounded back    Patient Education and Home Exercises       Education provided:   - what to expect functional dry needling   -Continue HEP    Written Home Exercises Provided: Patient instructed to cont prior HEP. Exercises were reviewed and Mary Alice was able to demonstrate them prior to the end of the session.  Mary Alice demonstrated good  understanding of the education provided. See Electronic Medical Record under Patient Instructions for exercises provided during therapy sessions    Assessment     Mary Alice is a 59 y.o. referred to outpatient Physical Therapy with a medical diagnosis of  S39.012D (ICD-10-CM) - Strain of lumbar region, subsequent encounter , S39.012D (ICD-10-CM) - Strain of lumbar region, subsequent encounter.  Patient tolerating light duty well at  work. Continues to presents with moderate level lower back pain. Focused treatment on core series and body mechanics training for bending and lifting. Lower back more irritable and unable to complete sit to stand due to pain. Noted lordosis with hip hinge that required frequent cuing to reduce. Will need further review at next session. Continue to progress as tolerated.     Mary Alice Is progressing well towards her goals.   Patient prognosis is Fair.   Rehab potential: Fair to good      Patient will benefit from skilled outpatient Physical Therapy to address the deficits stated above and in the chart below, provide patient /family education, to maximize patient's level of independence, and to address work-related functional deficits for their job as a .     Patient's spiritual, cultural and educational needs considered and pt agreeable to plan of care and goals.     Anticipated barriers to physical therapy: : non-work related co-morbidities, chronicity of current injury, and fear of re-injury     Goals:   Short Terms Goals: 6 visits      Goal  Progress  Date    (1)  Patient will be I with HOME EXERCISE PROGRAM  Initial, Not Met     (2)  Patient will be able to lift 15# from floor to waist level with appropriate technique x 10 reps.  Initial, Not Met      (3)   Patient will be able to stand for 30 mins or longer during her session with </=5/10 pain level, denoting improved tolerance to loading of spine.  Initial, Not Met         Long Term Goals: 12 visits     Goal  Progress  Date    (1)  Patient will be able to lift 20# or more from floor to waist level with appropriate technique x 10 reps. Initial, Not Met      (2)   Patient will carry 20# x  ft with good core activation with </=3/10 pain level.  Initial, Not Met     (3)   Patient will be able to stand for duration of session with </=3/10 pain level reported.  Initial, Not Met         Plan     Plan of care Certification: 4/19/2024 to end of 12 visits.      Mary Kate Abraham, PTA

## 2024-05-22 ENCOUNTER — CLINICAL SUPPORT (OUTPATIENT)
Dept: REHABILITATION | Facility: HOSPITAL | Age: 59
End: 2024-05-22
Payer: OTHER GOVERNMENT

## 2024-05-22 DIAGNOSIS — M53.87 SCIATICA ASSOCIATED WITH DISORDER OF LUMBOSACRAL SPINE: Primary | ICD-10-CM

## 2024-05-22 DIAGNOSIS — R29.898 IMPAIRED STRENGTH OF LOWER EXTREMITY: ICD-10-CM

## 2024-05-22 DIAGNOSIS — Z74.09 IMPAIRED FUNCTIONAL MOBILITY AND ACTIVITY TOLERANCE: ICD-10-CM

## 2024-05-22 PROCEDURE — 97140 MANUAL THERAPY 1/> REGIONS: CPT | Mod: PN

## 2024-05-22 PROCEDURE — 97112 NEUROMUSCULAR REEDUCATION: CPT | Mod: PN

## 2024-05-22 PROCEDURE — 97014 ELECTRIC STIMULATION THERAPY: CPT | Mod: KX,PN

## 2024-05-22 NOTE — PROGRESS NOTES
[facility]   Physical Therapy Treatment Note      Name: Mary Alice Garcia  [facility] Number: [id number]    Therapy Diagnosis:   Encounter Diagnoses   Name Primary?    Sciatica associated with disorder of lumbosacral spine Yes    Impaired strength of lower extremity     Impaired functional mobility and activity tolerance      Physician: Garrick Hernandez PA-C    Visit Date: 5/22/2024  Physician Orders: PT Eval and Treat   Medical Diagnosis from Referral: S39.012D (ICD-10-CM) - Strain of lumbar region, subsequent encounter  S39.012D (ICD-10-CM) - Strain of lumbar region, subsequent encounter   CLAIM#[id number] DOI:[id number] LUMBAR INJURY  Auth Request Number: [id number]   Evaluation Date: 4/19/24  Authorization Period Expiration:    03/26/2024  Plan of Care Expiration: End of 12 visits  Visit # / Visits authorized: 6/ 12  Cx/no show: 2   PTA Visit #: 0/5     Time In: 11:05 am  Time Out: 12:07pm  Total Billable Time: 62 minutes-  3MT, 1 NMRE, 1 E-stim (Unattended)        #1/3 4/19/24 43%   #2/3  Due visit 7     #3/3         Precautions: Standard,  light duty at work    Date of onset: 8/19/24  From MD visit:  Patient's place of employment -[employer]   Patient's job title -   Date of Injury - 8/19/2023Body part injured - L Lower Back  Current work status per last visit - Light Duty   Subjective     Patient reports: Back has been more painful. Gabapentin helps at night. But needing pain relief during the day.  She was compliant with home exercise program.  Response to previous treatment: decreased pain 2 days   Functional change: ongoing    Pain: 8/10-->7/10  Location: left back , buttocks , and upper legs     Objective      Objective Measures updated at progress report unless specified.       5/22/24:   Antalgic, stiff gait pattern.    Hips PROM: WNL  Tenderness noted in bilateral piriformis, L5/S1,   Bilateral paraspinal tight,hypomobile.  Bilateral hip flexors tight (R>L)  Treatment       Mary Alice  received the treatments listed below:      therapeutic exercises to develop strength, endurance, ROM, flexibility, posture, and core stabilization for 10 minutes including:  +Seated L piriformis stretch x 10'' hold x 10     manual therapy techniques: Joint mobilizations, Myofacial release, and Soft tissue Mobilization were applied to the: low back for 45minutes, including:  Myofascial release hip flexors (R more tight than L, both tight); long axis distraction bilaterally-slight relief  Muscle energy technique piriformis bilaterally x 2 trials, 3 reps each, 5'' hold isometric contraction, stretch.- relief  Side-lying joint distraction- no relief  Theragun to left piriformis, gluteal muscles.  Supine Moist heat with e-stim (TENS N) x 15 mins during above manual therapy.  Functional leg length assessment: even      neuromuscular re-education activities to improve: Coordination, Kinesthetic, Sense, Proprioception, and Posture for 15 minutes. The following activities were included:  Supine transversus abdominus activation, posterior pelvic tilt with bridge x 10 reps (difficult to maintain TA contraction)  Supine lower trunk rotation with TA contraction 20x  Add next: Seated iso hip abduction using belt x 10'' hold x 10 reps    Only add next if pain is <5/10:  Sahrmann level 1 2x10  Sahrmann level 3 1x10  NP  Seated nerve glides 2x15 reps bilateral   Hooklying transverse abdominus activation SAPD 15x green theraband     therapeutic activities to improve functional performance for 00 minutes, including:  wall hip hinge 2x10 with 2 walking laps between sets NP  Sit to stand with hip hinge held due to pain    Teach bending/lifting with rounded back    Patient Education and Home Exercises       Education provided:   - what to expect functional dry needling   -Continue HEP    Written Home Exercises Provided: Patient instructed to cont prior HEP. Exercises were reviewed and Mary Alice was able to demonstrate them prior to the end  of the session.  Mary Alice demonstrated good  understanding of the education provided. See Electronic Medical Record under Patient Instructions for exercises provided during therapy sessions. HEP updated 5/22/24 by KM.    Assessment     Mary Alice is a 59 y.o. referred to outpatient Physical Therapy with a medical diagnosis of  S39.012D (ICD-10-CM) - Strain of lumbar region, subsequent encounter , S39.012D (ICD-10-CM) - Strain of lumbar region, subsequent encounter. Presents for visit 6/12. High pain and 2 week break from therapy due to cx and NS. Presents today with primarily left SI joint pain, describing pain as traveling from left low back to buttock. Pt tender to touch along left and right piriformis (L more so than R).  Hip flexors and erector spinae tight due to increased lumbar lordosis daily. Difficulty with maintaining transversus abdominus activation today during bridging exercise. Most relief noted with piriformis muscle energy technique. Still high pain upon leaving therapy. Will continue to address motor control deficit and weakness in gluteal muscles, core to decrease pain. Likely will need an extension after first 12 visits.        Mary Alice Is progressing well towards her goals.   Patient prognosis is Fair.   Rehab potential: Fair to good      Patient will benefit from skilled outpatient Physical Therapy to address the deficits stated above and in the chart below, provide patient /family education, to maximize patient's level of independence, and to address work-related functional deficits for their job as a .     Patient's spiritual, cultural and educational needs considered and pt agreeable to plan of care and goals.     Anticipated barriers to physical therapy: : non-work related co-morbidities, chronicity of current injury, and fear of re-injury     Goals:   Short Terms Goals: 6 visits      Goal  Progress  Date    (1)  Patient will be I with HOME EXERCISE PROGRAM  Initial, Not Met     (2)  Patient  will be able to lift 15# from floor to waist level with appropriate technique x 10 reps.  Initial, Not Met      (3)   Patient will be able to stand for 30 mins or longer during her session with </=5/10 pain level, denoting improved tolerance to loading of spine.  Initial, Not Met         Long Term Goals: 12 visits     Goal  Progress  Date    (1)  Patient will be able to lift 20# or more from floor to waist level with appropriate technique x 10 reps. Initial, Not Met      (2)   Patient will carry 20# x  ft with good core activation with </=3/10 pain level.  Initial, Not Met     (3)   Patient will be able to stand for duration of session with </=3/10 pain level reported.  Initial, Not Met         Plan   TA contraction, posterior pelvic tilt  SI joint stability via isometrics (supine, seated, standing)  Core stability in supine, sitting, standing  Find neutral pelvis      Plan of care Certification: 4/19/2024 to end of 12 visits.     Floresita Merida, PT , DPT  5/22/2024

## 2024-05-24 ENCOUNTER — OFFICE VISIT (OUTPATIENT)
Dept: URGENT CARE | Facility: CLINIC | Age: 59
End: 2024-05-24
Payer: OTHER GOVERNMENT

## 2024-05-24 VITALS
WEIGHT: 155.63 LBS | OXYGEN SATURATION: 98 % | DIASTOLIC BLOOD PRESSURE: 90 MMHG | HEIGHT: 63 IN | HEART RATE: 70 BPM | RESPIRATION RATE: 16 BRPM | BODY MASS INDEX: 27.57 KG/M2 | SYSTOLIC BLOOD PRESSURE: 132 MMHG | TEMPERATURE: 98 F

## 2024-05-24 DIAGNOSIS — Z02.6 ENCOUNTER RELATED TO WORKER'S COMPENSATION CLAIM: ICD-10-CM

## 2024-05-24 DIAGNOSIS — S39.012D STRAIN OF LUMBAR REGION, SUBSEQUENT ENCOUNTER: Primary | ICD-10-CM

## 2024-05-24 DIAGNOSIS — G89.29 CHRONIC LEFT-SIDED LOW BACK PAIN WITH LEFT-SIDED SCIATICA: ICD-10-CM

## 2024-05-24 DIAGNOSIS — M54.42 CHRONIC LEFT-SIDED LOW BACK PAIN WITH LEFT-SIDED SCIATICA: ICD-10-CM
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
## 2024-05-24 DIAGNOSIS — M51.36 ANNULAR TEAR OF LUMBAR DISC: ICD-10-CM

## 2024-05-24 DIAGNOSIS — M54.10 BACK PAIN WITH LEFT-SIDED RADICULOPATHY: ICD-10-CM

## 2024-05-24 PROCEDURE — 99213 OFFICE O/P EST LOW 20 MIN: CPT | Mod: 25,S$GLB,, | Performed by: PHYSICIAN ASSISTANT

## 2024-05-24 PROCEDURE — 96372 THER/PROPH/DIAG INJ SC/IM: CPT | Mod: S$GLB,,, | Performed by: PHYSICIAN ASSISTANT

## 2024-05-24 RX ORDER — LIDOCAINE 50 MG/G
1 PATCH TOPICAL DAILY
Qty: 15 PATCH | Refills: 0 | Status: SHIPPED | OUTPATIENT
Start: 2024-05-24 | End: 2024-06-08

## 2024-05-24 RX ORDER — KETOROLAC TROMETHAMINE 30 MG/ML
30 INJECTION, SOLUTION INTRAMUSCULAR; INTRAVENOUS
Status: COMPLETED | OUTPATIENT
Start: 2024-05-24 | End: 2024-05-24

## 2024-05-24 RX ORDER — BETAMETHASONE SODIUM PHOSPHATE AND BETAMETHASONE ACETATE 3; 3 MG/ML; MG/ML
9 INJECTION, SUSPENSION INTRA-ARTICULAR; INTRALESIONAL; INTRAMUSCULAR; SOFT TISSUE
Status: COMPLETED | OUTPATIENT
Start: 2024-05-24 | End: 2024-05-24

## 2024-05-24 RX ORDER — GABAPENTIN 600 MG/1
600 TABLET ORAL 3 TIMES DAILY
Qty: 90 TABLET | Refills: 1 | Status: SHIPPED | OUTPATIENT
Start: 2024-05-24 | End: 2024-07-23

## 2024-05-24 RX ORDER — DICLOFENAC SODIUM 10 MG/G
2 GEL TOPICAL 3 TIMES DAILY
Qty: 100 G | Refills: 2 | Status: SHIPPED | OUTPATIENT
Start: 2024-05-24

## 2024-05-24 RX ADMIN — KETOROLAC TROMETHAMINE 30 MG: 30 INJECTION, SOLUTION INTRAMUSCULAR; INTRAVENOUS at 01:05

## 2024-05-24 RX ADMIN — BETAMETHASONE SODIUM PHOSPHATE AND BETAMETHASONE ACETATE 9 MG: 3; 3 INJECTION, SUSPENSION INTRA-ARTICULAR; INTRALESIONAL; INTRAMUSCULAR; SOFT TISSUE at 01:05

## 2024-05-24 NOTE — PROGRESS NOTES
Subjective:      Patient ID: Mary Alice Garcia is a 59 y.o. female.    Chief Complaint: Back Pain (LOWER BACK)    Patient's place of employment - [employer]  Patient's job title -   Date of Injury - 8/19/2023  Body part injured - LOWER BACK   Current work status per last visit - LIGHT DUTY   Improved, same, or worse - WORSE   Pain Scale right now (1-10) -  8/10    KSD      Provider note:   Patient presents to clinic early due to increased pain over the last few weeks.  She was scheduled to return to clinic next week.  Says her pain has increased despite physical therapy, warm soaks and home exercises.  She did not have any new traumatic events or activities inciting pain.  Says the pain is in the left low with radiation to the buttock 8/10.   Patient completed her last authorized physical therapy appointment this week.  She has not seen pain management even though the referral has been authorized since 03/12/2024.     Back Pain  Pertinent negatives include no numbness.     Constitution: Positive for activity change.   Musculoskeletal:  Positive for pain and back pain.   Neurological:  Negative for numbness and tingling.     Objective:     Physical Exam  Vitals and nursing note reviewed.   Constitutional:       General: She is not in acute distress.     Appearance: Normal appearance. She is well-developed.   HENT:      Head: Normocephalic and atraumatic.      Right Ear: Hearing and external ear normal.      Left Ear: Hearing and external ear normal.      Nose: Nose normal. No nasal deformity.   Eyes:      General: Lids are normal.      Conjunctiva/sclera: Conjunctivae normal.      Right eye: Right conjunctiva is not injected.      Left eye: Left conjunctiva is not injected.   Neck:      Trachea: Trachea normal.   Pulmonary:      Effort: Pulmonary effort is normal. No respiratory distress.      Breath sounds: No stridor.   Musculoskeletal:      Cervical back: Normal and normal range of motion. No spinous process tenderness  or muscular tenderness.      Thoracic back: Normal.      Lumbar back: Tenderness present. No deformity. Decreased range of motion (Flexion to 60° at the waist). Positive left straight leg raise test. Negative right straight leg raise test.        Back:    Skin:     General: Skin is warm and dry.      Findings: No abrasion or bruising.   Neurological:      General: No focal deficit present.      Mental Status: She is alert.   Psychiatric:         Attention and Perception: She is attentive.         Speech: Speech normal.         Behavior: Behavior normal.         Thought Content: Thought content normal.        Assessment:      1. Strain of lumbar region, subsequent encounter    2. Encounter related to worker's compensation claim    3. Chronic left-sided low back pain with left-sided sciatica    4. Annular tear of lumbar disc    5. Work related injury    6. Back pain with left-sided radiculopathy      Plan:     Patient with persistent low back pain getting worse.  I will refer to healthy back program.  I will also reach out to [facility] to assist with getting patient into pain management.  I have refilled her gabapentin.  Will try lidocaine patches and topical Voltaren gel.  She may take Tylenol every 6 hours as needed.  Patient verbalized understanding and agreement.      Medications Ordered This Encounter   Medications    betamethasone acetate-betamethasone sodium phosphate injection 9 mg    diclofenac sodium (VOLTAREN) 1 % Gel     Sig: Apply 2 g topically 3 (three) times daily.     Dispense:  100 g     Refill:  2    gabapentin (NEURONTIN) 600 MG tablet     Sig: Take 1 tablet (600 mg total) by mouth 3 (three) times daily.     Dispense:  90 tablet     Refill:  1    ketorolac injection 30 mg    LIDOcaine (LIDODERM) 5 %     Sig: Place 1 patch onto the skin once daily. Remove & Discard patch within 12 hours or as directed by MD for 15 days     Dispense:  15 patch     Refill:  0     Patient Instructions: Attention not to  aggravate affected area, Daily home exercises/warm soaks, Continue Physical Therapy   Restrictions:  (See CA-17)  Follow up in about 5 weeks (around 6/28/2024) for Reassessment.

## 2024-05-24 NOTE — LETTER
[facility] [address] [address]  Phone: [phone]  Fax: [phone]  [facility]: [id number]    Pt Name: Mary Alice Garcia  Injury Date: 08/19/2023   Employee ID: [id number] Date of Treatment: 05/24/2024   Company: [employer]      Appointment Time: 11:40 AM Arrived: 11:55 AM   Provider: Garrick Hernandez PA-C Time Out:1:18 PM     Office Treatment:   1. Strain of lumbar region, subsequent encounter    2. Encounter related to worker's compensation claim    3. Chronic left-sided low back pain with left-sided sciatica    4. Annular tear of lumbar disc    5. Work related injury    6. Back pain with left-sided radiculopathy      Medications Ordered This Encounter   Medications    betamethasone acetate-betamethasone sodium phosphate injection 9 mg    diclofenac sodium (VOLTAREN) 1 % Gel    gabapentin (NEURONTIN) 600 MG tablet    ketorolac injection 30 mg    LIDOcaine (LIDODERM) 5 %      Patient Instructions: Attention not to aggravate affected area, Daily home exercises/warm soaks, Continue Physical Therapy      Restrictions:  (See CA-17)     Return Appointment: 6/28/2024 at 9:00 AM    TABATHA

## 2024-05-24 NOTE — LETTER
[facility] [address] [address]  Phone: [phone]  Fax: [phone]  [facility]: [email or web address]    Pt Name: Mary Alice Garcia  Injury Date: 08/19/2023   Employee ID: [id number] Date of Treatment: 05/24/2024   Company: [employer]      Appointment Time: 11:40 AM Arrived: 11:55 AM   Provider: Garrick Hernandez PA-C Time Out: 1:18 PM     Office Treatment:   1. Strain of lumbar region, subsequent encounter    2. Encounter related to worker's compensation claim    3. Chronic left-sided low back pain with left-sided sciatica    4. Annular tear of lumbar disc    5. Work related injury    6. Back pain with left-sided radiculopathy      Medications Ordered This Encounter   Medications    betamethasone acetate-betamethasone sodium phosphate injection 9 mg    diclofenac sodium (VOLTAREN) 1 % Gel    gabapentin (NEURONTIN) 600 MG tablet    ketorolac injection 30 mg    LIDOcaine (LIDODERM) 5 %      Patient Instructions: Attention not to aggravate affected area, Daily home exercises/warm soaks, Continue Physical Therapy      Restrictions:  (See CA-17)     Return Appointment: 6/5/2024 at 9:00 AM    TABATHA

## 2024-06-12 ENCOUNTER — CLINICAL SUPPORT (OUTPATIENT)
Dept: REHABILITATION | Facility: HOSPITAL | Age: 59
End: 2024-06-12
Payer: OTHER GOVERNMENT

## 2024-06-12 DIAGNOSIS — R29.898 IMPAIRED STRENGTH OF LOWER EXTREMITY: ICD-10-CM

## 2024-06-12 DIAGNOSIS — Z74.09 IMPAIRED FUNCTIONAL MOBILITY AND ACTIVITY TOLERANCE: ICD-10-CM

## 2024-06-12 DIAGNOSIS — M53.87 SCIATICA ASSOCIATED WITH DISORDER OF LUMBOSACRAL SPINE: Primary | ICD-10-CM

## 2024-06-12 PROCEDURE — 97112 NEUROMUSCULAR REEDUCATION: CPT | Mod: PN,CQ

## 2024-06-12 PROCEDURE — 97140 MANUAL THERAPY 1/> REGIONS: CPT | Mod: PN,CQ

## 2024-06-12 NOTE — PROGRESS NOTES
[facility]   Physical Therapy Treatment Note      Name: Mary Alice Garcia  [facility] Number: [id number]    Therapy Diagnosis:   Encounter Diagnoses   Name Primary?    Sciatica associated with disorder of lumbosacral spine Yes    Impaired strength of lower extremity     Impaired functional mobility and activity tolerance        Physician: Garrick Hernandez PA-C    Visit Date: 6/12/2024  Physician Orders: PT Eval and Treat   Medical Diagnosis from Referral: S39.012D (ICD-10-CM) - Strain of lumbar region, subsequent encounter  S39.012D (ICD-10-CM) - Strain of lumbar region, subsequent encounter   CLAIM#[id number] DOI:[id number] LUMBAR INJURY  Auth Request Number: [id number]   Evaluation Date: 4/19/24  Authorization Period Expiration:    03/26/2024  Plan of Care Expiration: End of 12 visits  Visit # / Visits authorized: 7/ 12  Cx/no show: 3   PTA Visit #: 1/5     Time In: 11:05 am  Time Out: 12:00 pm  Total Billable Time: 55 minutes-  1MT, 3 NMRE        #1/3 4/19/24 43%   #2/3  Due visit 7     #3/3         Precautions: Standard,  light duty at work    Date of onset: 8/19/24  From MD visit:  Patient's place of employment -[employer]   Patient's job title -   Date of Injury - 8/19/2023Body part injured - L Lower Back  Current work status per last visit - Light Duty   Subjective     Patient reports: Back pain still occurring daily. Pain managed with gabapentin and topical cream. Worse with bending over. Had injection almost 3 weeks ago with only temporary relief for about 3 days.   She was compliant with home exercise program.  Response to previous treatment: decreased pain 2 days   Functional change: ongoing    Pain: 7/10  Location: left back , buttocks , and upper legs     Objective      Objective Measures updated at progress report unless specified.       5/22/24:   Antalgic, stiff gait pattern.    Hips PROM: WNL  Tenderness noted in bilateral piriformis, L5/S1,   Bilateral paraspinal  "tight,hypomobile.  Bilateral hip flexors tight (R>L)  Treatment       Mary Alice received the treatments listed below:      therapeutic exercises to develop strength, endurance, ROM, flexibility, posture, and core stabilization for 5 minutes including:  +Seated L piriformis stretch x 30'' hold x 3     manual therapy techniques: Joint mobilizations, Myofacial release, and Soft tissue Mobilization were applied to the: low back for 10 minutes, including:  Myofascial release hip flexors (R more tight than L, both tight) NP  Muscle energy technique piriformis bilaterally x 2 trials, 3 reps each, 5'' hold isometric contraction, stretch.- NP  LAD- mild relief   Theragun to left piriformis, gluteal muscles. NP  Supine Moist heat with e-stim (TENS N) x 15 mins during above manual therapy. (Heat only today)      neuromuscular re-education activities to improve: Coordination, Kinesthetic, Sense, Proprioception, and Posture for 40 minutes. The following activities were included:  Supine transversus abdominus activation, posterior pelvic tilt with bridge x 10 reps (difficult to maintain TA contraction)  Supine lower trunk rotation with TA contraction 20x  Supine iso hip abd with belt + TrA 5" x 2'  Supine iso hip add with yellow ball + TrA 5" x 2'  Seated nerve glides 2x15 reps bilateral     Only add next if pain is <5/10:  Sahrmann level 1 2x10  Sahrmann level 3 1x10  NP  Hooklying transverse abdominus activation SAPD 15x green theraband     therapeutic activities to improve functional performance for 00 minutes, including:  wall hip hinge 2x10 with 2 walking laps between sets NP  Sit to stand with hip hinge held due to pain    Teach bending/lifting with rounded back    Patient Education and Home Exercises       Education provided:   - what to expect functional dry needling   -Continue HEP    Written Home Exercises Provided: Patient instructed to cont prior HEP. Exercises were reviewed and Mary Alice was able to demonstrate them prior to " the end of the session.  Mary Alice demonstrated good  understanding of the education provided. See Electronic Medical Record under Patient Instructions for exercises provided during therapy sessions. HEP updated 5/22/24 by KM.    Assessment     Mary Alice is a 59 y.o. referred to outpatient Physical Therapy with a medical diagnosis of  S39.012D (ICD-10-CM) - Strain of lumbar region, subsequent encounter , S39.012D (ICD-10-CM) - Strain of lumbar region, subsequent encounter. Presents for visit 7/12. High pain and ~3 week break from therapy due to pre authorization pending. Minimal change in symptoms since last treatment session. Reports only short term relief from pain with prescribed medication and injection received 3 weeks ago. Presents today with ongoing primary complaint of left SI joint pain, describing pain as traveling from left low back to buttock. Mild relief with lumbar flexion stretch. Overall minimal improvement in pain post session. Hot pack post session for pain management. Will continue to address motor control deficit and weakness in gluteal muscles, core to decrease pain. Likely will need an extension after first 12 visits.        Mary Alice Is progressing well towards her goals.   Patient prognosis is Fair.   Rehab potential: Fair to good      Patient will benefit from skilled outpatient Physical Therapy to address the deficits stated above and in the chart below, provide patient /family education, to maximize patient's level of independence, and to address work-related functional deficits for their job as a .     Patient's spiritual, cultural and educational needs considered and pt agreeable to plan of care and goals.     Anticipated barriers to physical therapy: : non-work related co-morbidities, chronicity of current injury, and fear of re-injury     Goals:   Short Terms Goals: 6 visits      Goal  Progress  Date    (1)  Patient will be I with HOME EXERCISE PROGRAM  Initial, Not Met     (2)  Patient  will be able to lift 15# from floor to waist level with appropriate technique x 10 reps.  Initial, Not Met      (3)   Patient will be able to stand for 30 mins or longer during her session with </=5/10 pain level, denoting improved tolerance to loading of spine.  Initial, Not Met         Long Term Goals: 12 visits     Goal  Progress  Date    (1)  Patient will be able to lift 20# or more from floor to waist level with appropriate technique x 10 reps. Initial, Not Met      (2)   Patient will carry 20# x  ft with good core activation with </=3/10 pain level.  Initial, Not Met     (3)   Patient will be able to stand for duration of session with </=3/10 pain level reported.  Initial, Not Met         Plan   TA contraction, posterior pelvic tilt  SI joint stability via isometrics (supine, seated, standing)  Core stability in supine, sitting, standing  Find neutral pelvis      Plan of care Certification: 4/19/2024 to end of 12 visits.     Mary Kate Abraham, PTA   6/12/2024

## 2024-06-24 ENCOUNTER — TELEPHONE (OUTPATIENT)
Dept: PRIMARY CARE CLINIC | Facility: CLINIC | Age: 59
End: 2024-06-24
Payer: COMMERCIAL

## 2024-06-24 NOTE — TELEPHONE ENCOUNTER
----- Message from Mel Sam sent at 6/24/2024 11:20 AM CDT -----  Type:  Appointment Request      Name of Caller:Pt   When is the first available appointment? October   Symptoms:eye issues / thyroid   Best Call Back Number:[phone]  Additional Information: pt tried to schedule with endo and could not get an appt... something is going on with her eyes and she think it is because of her thyroid issues

## 2024-06-25 ENCOUNTER — OFFICE VISIT (OUTPATIENT)
Dept: URGENT CARE | Facility: CLINIC | Age: 59
End: 2024-06-25
Payer: OTHER GOVERNMENT

## 2024-06-25 ENCOUNTER — NURSE TRIAGE (OUTPATIENT)
Dept: ADMINISTRATIVE | Facility: CLINIC | Age: 59
End: 2024-06-25
Payer: COMMERCIAL

## 2024-06-25 VITALS
SYSTOLIC BLOOD PRESSURE: 112 MMHG | DIASTOLIC BLOOD PRESSURE: 78 MMHG | TEMPERATURE: 98 F | HEIGHT: 63 IN | BODY MASS INDEX: 27.46 KG/M2 | OXYGEN SATURATION: 98 % | RESPIRATION RATE: 17 BRPM | HEART RATE: 85 BPM | WEIGHT: 155 LBS

## 2024-06-25 DIAGNOSIS — S39.012D STRAIN OF LUMBAR REGION, SUBSEQUENT ENCOUNTER: ICD-10-CM

## 2024-06-25 DIAGNOSIS — S39.012A ACUTE MYOFASCIAL STRAIN OF LUMBAR REGION, INITIAL ENCOUNTER: ICD-10-CM
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
## 2024-06-25 DIAGNOSIS — M51.36 ANNULAR TEAR OF LUMBAR DISC: ICD-10-CM

## 2024-06-25 DIAGNOSIS — G89.29 CHRONIC LEFT-SIDED LOW BACK PAIN WITH LEFT-SIDED SCIATICA: Primary | ICD-10-CM

## 2024-06-25 DIAGNOSIS — Z02.6 ENCOUNTER RELATED TO WORKER'S COMPENSATION CLAIM: ICD-10-CM

## 2024-06-25 DIAGNOSIS — M54.42 CHRONIC LEFT-SIDED LOW BACK PAIN WITH LEFT-SIDED SCIATICA: Primary | ICD-10-CM

## 2024-06-25 RX ORDER — ACETAMINOPHEN 500 MG
1000 TABLET ORAL EVERY 6 HOURS PRN
Qty: 60 TABLET | Refills: 1 | Status: SHIPPED | OUTPATIENT
Start: 2024-06-25 | End: 2025-06-25

## 2024-06-25 RX ORDER — GABAPENTIN 600 MG/1
600 TABLET ORAL 3 TIMES DAILY
Qty: 90 TABLET | Refills: 1 | Status: SHIPPED | OUTPATIENT
Start: 2024-06-25 | End: 2024-08-24

## 2024-06-25 RX ORDER — GABAPENTIN 600 MG/1
600 TABLET ORAL 3 TIMES DAILY
Qty: 90 TABLET | Refills: 1 | Status: SHIPPED | OUTPATIENT
Start: 2024-06-25 | End: 2024-06-25

## 2024-06-25 RX ORDER — DICLOFENAC SODIUM 10 MG/G
2 GEL TOPICAL 3 TIMES DAILY
Qty: 100 G | Refills: 2 | Status: SHIPPED | OUTPATIENT
Start: 2024-06-25 | End: 2024-06-25

## 2024-06-25 RX ORDER — DICLOFENAC SODIUM 10 MG/G
2 GEL TOPICAL 3 TIMES DAILY
Qty: 100 G | Refills: 2 | Status: SHIPPED | OUTPATIENT
Start: 2024-06-25

## 2024-06-25 RX ORDER — ACETAMINOPHEN 500 MG
1000 TABLET ORAL EVERY 6 HOURS PRN
Qty: 60 TABLET | Refills: 1 | Status: SHIPPED | OUTPATIENT
Start: 2024-06-25 | End: 2024-06-25

## 2024-06-25 NOTE — LETTER
[facility] [address] [address]  Phone: [phone]  Fax: [phone]  [facility]: [id number]    Pt Name: Mary Alice Garcia  Injury Date: 08/19/2023   Employee ID:  Date of Treatment: 06/25/2024   Company: [employer]      Appointment Time: 10:45 AM Arrived: 10:55 am   Provider: Garrick Hernandez PA-C Time Out:12:00 pm     Office Treatment:   1. Chronic left-sided low back pain with left-sided sciatica    2. Encounter related to worker's compensation claim    3. Strain of lumbar region, subsequent encounter    4. Annular tear of lumbar disc    5. Work related injury    6. Acute myofascial strain of lumbar region, initial encounter      Medications Ordered This Encounter   Medications    acetaminophen (TYLENOL EXTRA STRENGTH) 500 MG tablet    diclofenac sodium (VOLTAREN) 1 % Gel    gabapentin (NEURONTIN) 600 MG tablet      Patient Instructions: Attention not to aggravate affected area, Daily home exercises/warm soaks, Begin Physical Therapy    Restrictions:  (See CA-17)     Return Appointment: 9/25/2024 at 9:00 beny

## 2024-06-25 NOTE — PROGRESS NOTES
Subjective:      Patient ID: Mary Alice Garcia is a 59 y.o. female.    Chief Complaint: Back Pain (DOI 08/19/2023 lower back injury Tucker)      Patient's place of employment - [employer]  Patient's job title -   Date of Injury - 8/19/2023  Body part injured - LOWER BACK   Current work status per last visit -  light duty   Improved, same, or worse - same  Pain Scale right now (1-10) -  5  tucker      Provider note:   Patient presents for follow-up chronic low back pain from an injury sustained at work last year.  She reports no significant improvement since last office visit.  Patient continues to have significant pain about the left low back with intermittent radiation to the left lower extremity.  Patient states activity makes worse, rest makes better.  Patient states she was contacted to begin the healthy back program.  Her 1st appointment will be in the next few weeks.  Patient was never seen by pain management.  She is currently using diclofenac gel, lidocaine patches, Tylenol and gabapentin for pain.  Patient is currently working light duty.  MEB    Back Pain  Pertinent negatives include no numbness.     Constitution: Positive for activity change.   Musculoskeletal:  Positive for pain and back pain.   Neurological:  Negative for numbness and tingling.     Objective:     Physical Exam  Vitals and nursing note reviewed.   Constitutional:       General: She is not in acute distress.     Appearance: Normal appearance. She is well-developed.   HENT:      Head: Normocephalic and atraumatic.      Right Ear: Hearing and external ear normal.      Left Ear: Hearing and external ear normal.      Nose: Nose normal. No nasal deformity.   Eyes:      General: Lids are normal.      Conjunctiva/sclera: Conjunctivae normal.      Right eye: Right conjunctiva is not injected.      Left eye: Left conjunctiva is not injected.   Neck:      Trachea: Trachea normal.   Pulmonary:      Effort: Pulmonary effort is normal. No respiratory distress.       Breath sounds: No stridor.   Musculoskeletal:      Cervical back: Normal and normal range of motion. No spinous process tenderness or muscular tenderness.      Thoracic back: Normal.      Lumbar back: Tenderness present. No deformity. Decreased range of motion (Flexion to 60° at the waist). Positive left straight leg raise test. Negative right straight leg raise test.        Back:    Skin:     General: Skin is warm and dry.      Findings: No abrasion or bruising.   Neurological:      General: No focal deficit present.      Mental Status: She is alert.   Psychiatric:         Attention and Perception: She is attentive.         Speech: Speech normal.         Behavior: Behavior normal.         Thought Content: Thought content normal.        Assessment:      1. Chronic left-sided low back pain with left-sided sciatica    2. Encounter related to worker's compensation claim    3. Strain of lumbar region, subsequent encounter    4. Annular tear of lumbar disc    5. Work related injury    6. Acute myofascial strain of lumbar region, initial encounter      Plan:     Patient with chronic low back pain.  I will reorder referral for pain management.  Patient to begin healthy back program next month.  Continue current medications.  Return to clinic in 3 months or sooner as needed.  Patient verbalized understanding and agreement.    It is with a high degree of medical certainty that this patient's current signs and symptoms were caused or exacerbated by the work related injury mentioned in the note above        Medications Ordered This Encounter   Medications    acetaminophen (TYLENOL EXTRA STRENGTH) 500 MG tablet     Sig: Take 2 tablets (1,000 mg total) by mouth every 6 (six) hours as needed for Pain.     Dispense:  60 tablet     Refill:  1    diclofenac sodium (VOLTAREN) 1 % Gel     Sig: Apply 2 g topically 3 (three) times daily.     Dispense:  100 g     Refill:  2    gabapentin (NEURONTIN) 600 MG tablet     Sig: Take 1 tablet  (600 mg total) by mouth 3 (three) times daily.     Dispense:  90 tablet     Refill:  1     Patient Instructions: Attention not to aggravate affected area, Daily home exercises/warm soaks, Begin Physical Therapy   Restrictions:  (See CA-17)  Follow up in about 3 months (around 9/25/2024).

## 2024-06-25 NOTE — TELEPHONE ENCOUNTER
[facility] RN Patient calling c/o  eye drainage, on right eye. Clear. Also, c/o  Right nostril is clogged.  Eyes Itchey and hurt,  Painful,  8/10 right eye.  Has history of thyroidectomy, in 2009.     Back pain, she has appt today.  Taking benadryl, for fluid coming from eye. Care advise go to ED now.  For any new or worsening symptoms to  callback [facility] RN, number given and patient VU.     Reason for Disposition   SEVERE eye pain    Additional Information   Negative: Unexplained bleeding and lasts > 10 minutes or large amount  (Exception: If a few drops of blood, continue with triage.)   Negative: Fever > 103 F (39.4 C)   Negative: Bloody or clear ear discharge and after a head or face injury   Negative: Patient sounds very sick or weak to the triager    Protocols used: Ear - [id number], Eye Pain and Other Symptoms-A-OH

## 2024-06-26 ENCOUNTER — CLINICAL SUPPORT (OUTPATIENT)
Dept: REHABILITATION | Facility: HOSPITAL | Age: 59
End: 2024-06-26
Payer: OTHER GOVERNMENT

## 2024-06-26 ENCOUNTER — TELEPHONE (OUTPATIENT)
Dept: ORTHOPEDICS | Facility: CLINIC | Age: 59
End: 2024-06-26
Payer: COMMERCIAL

## 2024-06-26 DIAGNOSIS — M53.87 SCIATICA ASSOCIATED WITH DISORDER OF LUMBOSACRAL SPINE: Primary | ICD-10-CM

## 2024-06-26 DIAGNOSIS — Z74.09 IMPAIRED FUNCTIONAL MOBILITY AND ACTIVITY TOLERANCE: ICD-10-CM

## 2024-06-26 DIAGNOSIS — R29.898 IMPAIRED STRENGTH OF LOWER EXTREMITY: ICD-10-CM

## 2024-06-26 PROCEDURE — 97112 NEUROMUSCULAR REEDUCATION: CPT | Mod: PN,CQ

## 2024-06-26 PROCEDURE — 97110 THERAPEUTIC EXERCISES: CPT | Mod: PN,CQ

## 2024-06-26 NOTE — PROGRESS NOTES
[facility]   Physical Therapy Treatment Note      Name: Mary Alice Garcia  [facility] Number: [id number]    Therapy Diagnosis:   Encounter Diagnoses   Name Primary?    Sciatica associated with disorder of lumbosacral spine Yes    Impaired strength of lower extremity     Impaired functional mobility and activity tolerance        Physician: Garrick Hernandez PA-C    Visit Date: 6/26/2024  Physician Orders: PT Eval and Treat   Medical Diagnosis from Referral: S39.012D (ICD-10-CM) - Strain of lumbar region, subsequent encounter  S39.012D (ICD-10-CM) - Strain of lumbar region, subsequent encounter   CLAIM#[id number] DOI:[id number] LUMBAR INJURY  Auth Request Number: [id number]   Evaluation Date: 4/19/24  Authorization Period Expiration: 03/26/2024  Plan of Care Expiration: End of 12 visits  Visit # / Visits authorized: 8/ 12  Cx/no show: 3   PTA Visit #: 2/5     Time In: 11:05 am  Time Out: 12:00 pm  Total Billable Time: 55 minutes-  1TE, 3 NMRE        #1/3 4/19/24 43%   #2/3  Due visit 7     #3/3         Precautions: Standard,  light duty at work    Date of onset: 8/19/24  From MD visit:  Patient's place of employment -[employer]   Patient's job title -   Date of Injury - 8/19/2023Body part injured - L Lower Back  Current work status per last visit - Light Duty     Subjective     Patient reports: Pain is only moderate because she took her medication today. Starts healthy back on 7/17/24.   She was compliant with home exercise program.  Response to previous treatment: decreased pain 2 days   Functional change: ongoing    Pain: 5/10  Location: left back , buttocks , and upper legs     Objective      Objective Measures updated at progress report unless specified.       5/22/24:   Antalgic, stiff gait pattern.    Hips PROM: WNL  Tenderness noted in bilateral piriformis, L5/S1,   Bilateral paraspinal tight,hypomobile.  Bilateral hip flexors tight (R>L)  Treatment       Mary Alice received the treatments listed  "below:      therapeutic exercises to develop strength, endurance, ROM, flexibility, posture, and core stabilization for 15 minutes including:  +Seated L piriformis stretch x 30'' hold x 3  +SKTC 10" x 10  +LTR 2'  +nu step 5' + MHP      manual therapy techniques: Joint mobilizations, Myofacial release, and Soft tissue Mobilization were applied to the: low back for 00 minutes, including:  Myofascial release hip flexors (R more tight than L, both tight) NP  Muscle energy technique piriformis bilaterally x 2 trials, 3 reps each, 5'' hold isometric contraction, stretch.- NP  LAD- mild relief   Theragun to left piriformis, gluteal muscles. NP  Supine Moist heat with e-stim (TENS N) x 15 mins during above manual therapy. (Heat only today)      neuromuscular re-education activities to improve: Coordination, Kinesthetic, Sense, Proprioception, and Posture for 40 minutes. The following activities were included:  Supine transversus abdominus activation, posterior pelvic tilt with bridge x 10 reps (difficult to maintain TA contraction)  Supine lower trunk rotation with TA contraction 20x  Supine iso hip abd with belt + TrA 5" x 2'  Supine iso hip add with yellow ball + TrA 5" x 2'  Seated nerve glides 2x15 reps bilateral     Only add next if pain is <5/10:  Sahrmann level 1 2x10  Sahrmann level 3 1x10  NP  Hooklying transverse abdominus activation SAPD 15x green theraband     therapeutic activities to improve functional performance for 00 minutes, including:  wall hip hinge 2x10 with 2 walking laps between sets NP  Sit to stand with hip hinge held due to pain    Teach bending/lifting with rounded back    Patient Education and Home Exercises       Education provided:   - what to expect functional dry needling   -Continue HEP    Written Home Exercises Provided: Patient instructed to cont prior HEP. Exercises were reviewed and Mary Alice was able to demonstrate them prior to the end of the session.  Mary Alice demonstrated good  " understanding of the education provided. See Electronic Medical Record under Patient Instructions for exercises provided during therapy sessions. HEP updated 5/22/24 by KM.    Assessment     Mary Alice is a 59 y.o. referred to outpatient Physical Therapy with a medical diagnosis of  S39.012D (ICD-10-CM) - Strain of lumbar region, subsequent encounter , S39.012D (ICD-10-CM) - Strain of lumbar region, subsequent encounter. Presents for visit 8/12. Moderate pain and ~2 week break from therapy. Follow up with occupational health yesterday and remains on light duty at this time, will also start healthy back on 7/17/24 with remain with us until then. Session continues to be limited due to pain and tolerance to higher level exercises. Continued to focus on mat core stability and gentle lumbar mobility. Will continue to address motor control deficit and weakness in gluteal muscles, core to decrease pain.         Mary Alice Is progressing well towards her goals.   Patient prognosis is Fair.   Rehab potential: Fair to good      Patient will benefit from skilled outpatient Physical Therapy to address the deficits stated above and in the chart below, provide patient /family education, to maximize patient's level of independence, and to address work-related functional deficits for their job as a .     Patient's spiritual, cultural and educational needs considered and pt agreeable to plan of care and goals.     Anticipated barriers to physical therapy: : non-work related co-morbidities, chronicity of current injury, and fear of re-injury     Goals:   Short Terms Goals: 6 visits      Goal  Progress  Date    (1)  Patient will be I with HOME EXERCISE PROGRAM  Initial, Not Met     (2)  Patient will be able to lift 15# from floor to waist level with appropriate technique x 10 reps.  Initial, Not Met      (3)   Patient will be able to stand for 30 mins or longer during her session with </=5/10 pain level, denoting improved tolerance to  loading of spine.  Initial, Not Met         Long Term Goals: 12 visits     Goal  Progress  Date    (1)  Patient will be able to lift 20# or more from floor to waist level with appropriate technique x 10 reps. Initial, Not Met      (2)   Patient will carry 20# x  ft with good core activation with </=3/10 pain level.  Initial, Not Met     (3)   Patient will be able to stand for duration of session with </=3/10 pain level reported.  Initial, Not Met         Plan   TA contraction, posterior pelvic tilt  SI joint stability via isometrics (supine, seated, standing)  Core stability in supine, sitting, standing  Find neutral pelvis      Plan of care Certification: 4/19/2024 to end of 12 visits.     Mary Kate Abraham, PTA   6/26/2024

## 2024-07-03 ENCOUNTER — TELEPHONE (OUTPATIENT)
Dept: PAIN MEDICINE | Facility: CLINIC | Age: 59
End: 2024-07-03
Payer: COMMERCIAL

## 2024-07-03 NOTE — TELEPHONE ENCOUNTER
Staff tried reaching out to patient to get her scheduled for a new patient workers comp appointment with one of our providers. Staff left voicemail.

## 2024-07-16 ENCOUNTER — HOSPITAL ENCOUNTER (EMERGENCY)
Facility: HOSPITAL | Age: 59
Discharge: HOME | End: 2024-07-16
Attending: STUDENT IN AN ORGANIZED HEALTH CARE EDUCATION/TRAINING PROGRAM
Payer: COMMERCIAL

## 2024-07-16 VITALS
HEIGHT: 63 IN | TEMPERATURE: 99 F | BODY MASS INDEX: 26.57 KG/M2 | SYSTOLIC BLOOD PRESSURE: 115 MMHG | HEART RATE: 54 BPM | OXYGEN SATURATION: 100 % | DIASTOLIC BLOOD PRESSURE: 76 MMHG | WEIGHT: 149.94 LBS | RESPIRATION RATE: 24 BRPM

## 2024-07-16 DIAGNOSIS — M54.6 ACUTE LEFT-SIDED THORACIC BACK PAIN: Primary | ICD-10-CM

## 2024-07-16 DIAGNOSIS — M54.10 RADICULOPATHY, UNSPECIFIED SPINAL REGION: ICD-10-CM

## 2024-07-16 DIAGNOSIS — R29.818 ACUTE FOCAL NEUROLOGICAL DEFICIT: ICD-10-CM

## 2024-07-16 DIAGNOSIS — E78.1 HYPERTRIGLYCERIDEMIA WITHOUT HYPERCHOLESTEROLEMIA: ICD-10-CM

## 2024-07-16 PROBLEM — R20.2 TINGLING OF LEFT ARM AND LEFT SIDE OF FACE: Status: ACTIVE | Noted: 2024-07-16

## 2024-07-16 LAB
ALBUMIN SERPL BCP-MCNC: 4 G/DL (ref 3.5–5.2)
ALLENS TEST: ABNORMAL
ALLENS TEST: NORMAL
ALP SERPL-CCNC: 73 U/L (ref 55–135)
ALT SERPL W/O P-5'-P-CCNC: 27 U/L (ref 10–44)
ANION GAP SERPL CALC-SCNC: 10 MMOL/L (ref 8–16)
AST SERPL-CCNC: 28 U/L (ref 10–40)
BASOPHILS # BLD AUTO: 0.05 K/UL (ref 0–0.2)
BASOPHILS NFR BLD: 0.6 % (ref 0–1.9)
BILIRUB SERPL-MCNC: 0.8 MG/DL (ref 0.1–1)
BUN SERPL-MCNC: 10 MG/DL (ref 6–20)
CALCIUM SERPL-MCNC: 9.9 MG/DL (ref 8.7–10.5)
CHLORIDE SERPL-SCNC: 103 MMOL/L (ref 95–110)
CHOLEST SERPL-MCNC: 191 MG/DL (ref 120–199)
CHOLEST/HDLC SERPL: 4.3 {RATIO} (ref 2–5)
CO2 SERPL-SCNC: 27 MMOL/L (ref 23–29)
CREAT SERPL-MCNC: 0.9 MG/DL (ref 0.5–1.4)
CREAT SERPL-MCNC: 0.9 MG/DL (ref 0.5–1.4)
DIFFERENTIAL METHOD BLD: NORMAL
EOSINOPHIL # BLD AUTO: 0.2 K/UL (ref 0–0.5)
EOSINOPHIL NFR BLD: 2.2 % (ref 0–8)
ERYTHROCYTE [DISTWIDTH] IN BLOOD BY AUTOMATED COUNT: 12.2 % (ref 11.5–14.5)
EST. GFR  (NO RACE VARIABLE): >60 ML/MIN/1.73 M^2
GLUCOSE SERPL-MCNC: 105 MG/DL (ref 70–110)
HCT VFR BLD AUTO: 40.7 % (ref 37–48.5)
HDLC SERPL-MCNC: 44 MG/DL (ref 40–75)
HDLC SERPL: 23 % (ref 20–50)
HGB BLD-MCNC: 14 G/DL (ref 12–16)
IMM GRANULOCYTES # BLD AUTO: 0.02 K/UL (ref 0–0.04)
IMM GRANULOCYTES NFR BLD AUTO: 0.2 % (ref 0–0.5)
INR PPP: 1 (ref 0.8–1.2)
LDLC SERPL CALC-MCNC: 79.6 MG/DL (ref 63–159)
LYMPHOCYTES # BLD AUTO: 3.5 K/UL (ref 1–4.8)
LYMPHOCYTES NFR BLD: 40.5 % (ref 18–48)
MCH RBC QN AUTO: 30.3 PG (ref 27–31)
MCHC RBC AUTO-ENTMCNC: 34.4 G/DL (ref 32–36)
MCV RBC AUTO: 88 FL (ref 82–98)
MONOCYTES # BLD AUTO: 0.6 K/UL (ref 0.3–1)
MONOCYTES NFR BLD: 6.6 % (ref 4–15)
NEUTROPHILS # BLD AUTO: 4.4 K/UL (ref 1.8–7.7)
NEUTROPHILS NFR BLD: 49.9 % (ref 38–73)
NONHDLC SERPL-MCNC: 147 MG/DL
NRBC BLD-RTO: 0 /100 WBC
PLATELET # BLD AUTO: 292 K/UL (ref 150–450)
PMV BLD AUTO: 9.8 FL (ref 9.2–12.9)
POC PTINR: 1.3 (ref 0.9–1.2)
POCT GLUCOSE: 121 MG/DL (ref 70–110)
POTASSIUM SERPL-SCNC: 3.8 MMOL/L (ref 3.5–5.1)
PROT SERPL-MCNC: 8.1 G/DL (ref 6–8.4)
PROTHROMBIN TIME: 10.9 SEC (ref 9–12.5)
RBC # BLD AUTO: 4.62 M/UL (ref 4–5.4)
SAMPLE: ABNORMAL
SAMPLE: NORMAL
SODIUM SERPL-SCNC: 140 MMOL/L (ref 136–145)
T4 FREE SERPL-MCNC: 1.16 NG/DL (ref 0.71–1.51)
TRIGL SERPL-MCNC: 337 MG/DL (ref 30–150)
TSH SERPL DL<=0.005 MIU/L-ACNC: 0.22 UIU/ML (ref 0.4–4)
WBC # BLD AUTO: 8.7 K/UL (ref 3.9–12.7)

## 2024-07-16 PROCEDURE — 84443 ASSAY THYROID STIM HORMONE: CPT | Performed by: STUDENT IN AN ORGANIZED HEALTH CARE EDUCATION/TRAINING PROGRAM

## 2024-07-16 PROCEDURE — 85610 PROTHROMBIN TIME: CPT

## 2024-07-16 PROCEDURE — 80061 LIPID PANEL: CPT | Performed by: STUDENT IN AN ORGANIZED HEALTH CARE EDUCATION/TRAINING PROGRAM

## 2024-07-16 PROCEDURE — 84439 ASSAY OF FREE THYROXINE: CPT | Performed by: STUDENT IN AN ORGANIZED HEALTH CARE EDUCATION/TRAINING PROGRAM

## 2024-07-16 PROCEDURE — 82565 ASSAY OF CREATININE: CPT

## 2024-07-16 PROCEDURE — 99900035 HC TECH TIME PER 15 MIN (STAT)

## 2024-07-16 PROCEDURE — 25000003 PHARM REV CODE 250: Performed by: STUDENT IN AN ORGANIZED HEALTH CARE EDUCATION/TRAINING PROGRAM

## 2024-07-16 PROCEDURE — 93005 ELECTROCARDIOGRAM TRACING: CPT

## 2024-07-16 PROCEDURE — 82962 GLUCOSE BLOOD TEST: CPT

## 2024-07-16 PROCEDURE — 80053 COMPREHEN METABOLIC PANEL: CPT | Performed by: STUDENT IN AN ORGANIZED HEALTH CARE EDUCATION/TRAINING PROGRAM

## 2024-07-16 PROCEDURE — 93010 ELECTROCARDIOGRAM REPORT: CPT | Mod: ,,, | Performed by: INTERNAL MEDICINE

## 2024-07-16 PROCEDURE — 96375 TX/PRO/DX INJ NEW DRUG ADDON: CPT

## 2024-07-16 PROCEDURE — G0426 INPT/ED TELECONSULT50: HCPCS | Mod: GT,,, | Performed by: PSYCHIATRY & NEUROLOGY

## 2024-07-16 PROCEDURE — 85025 COMPLETE CBC W/AUTO DIFF WBC: CPT | Performed by: STUDENT IN AN ORGANIZED HEALTH CARE EDUCATION/TRAINING PROGRAM

## 2024-07-16 PROCEDURE — 96374 THER/PROPH/DIAG INJ IV PUSH: CPT

## 2024-07-16 PROCEDURE — 99285 EMERGENCY DEPT VISIT HI MDM: CPT | Mod: 25

## 2024-07-16 PROCEDURE — 63600175 PHARM REV CODE 636 W HCPCS: Performed by: STUDENT IN AN ORGANIZED HEALTH CARE EDUCATION/TRAINING PROGRAM

## 2024-07-16 PROCEDURE — 85610 PROTHROMBIN TIME: CPT | Performed by: STUDENT IN AN ORGANIZED HEALTH CARE EDUCATION/TRAINING PROGRAM

## 2024-07-16 RX ORDER — LIDOCAINE 50 MG/G
1 PATCH TOPICAL
Status: DISCONTINUED | OUTPATIENT
Start: 2024-07-16 | End: 2024-07-16 | Stop reason: HOSPADM

## 2024-07-16 RX ORDER — PROCHLORPERAZINE EDISYLATE 5 MG/ML
10 INJECTION INTRAMUSCULAR; INTRAVENOUS ONCE
Status: COMPLETED | OUTPATIENT
Start: 2024-07-16 | End: 2024-07-16

## 2024-07-16 RX ORDER — KETOROLAC TROMETHAMINE 30 MG/ML
15 INJECTION, SOLUTION INTRAMUSCULAR; INTRAVENOUS
Status: COMPLETED | OUTPATIENT
Start: 2024-07-16 | End: 2024-07-16

## 2024-07-16 RX ORDER — METHOCARBAMOL 500 MG/1
1000 TABLET, FILM COATED ORAL
Status: COMPLETED | OUTPATIENT
Start: 2024-07-16 | End: 2024-07-16

## 2024-07-16 RX ORDER — METHOCARBAMOL 500 MG/1
1000 TABLET, FILM COATED ORAL 3 TIMES DAILY
Qty: 30 TABLET | Refills: 0 | Status: SHIPPED | OUTPATIENT
Start: 2024-07-16 | End: 2024-07-21

## 2024-07-16 RX ORDER — KETOROLAC TROMETHAMINE 10 MG/1
10 TABLET, FILM COATED ORAL EVERY 6 HOURS PRN
Qty: 20 TABLET | Refills: 0 | Status: SHIPPED | OUTPATIENT
Start: 2024-07-16 | End: 2024-07-21

## 2024-07-16 RX ADMIN — LIDOCAINE 1 PATCH: 50 PATCH CUTANEOUS at 08:07

## 2024-07-16 RX ADMIN — KETOROLAC TROMETHAMINE 15 MG: 30 INJECTION, SOLUTION INTRAMUSCULAR; INTRAVENOUS at 08:07

## 2024-07-16 RX ADMIN — METHOCARBAMOL 1000 MG: 500 TABLET ORAL at 08:07

## 2024-07-16 RX ADMIN — PROCHLORPERAZINE EDISYLATE 10 MG: 5 INJECTION INTRAMUSCULAR; INTRAVENOUS at 08:07

## 2024-07-16 NOTE — ED PROVIDER NOTES
NAME:  Mary Alice Garcia  CSN:     [id number]  MRN:    [id number]  ADMIT DATE: 2024        eMERGENCY dEPARTMENT eNCOUnter    CHIEF COMPLAINT    Chief Complaint   Patient presents with    Back Pain     Upper Left back with numbness to left arm       HPI    Mary Alice Garcia is a 59 y.o. female with a past medical history of  has a past medical history of Arthritis, Depression, Hyperlipidemia, Hypertension, Syncope and collapse, Thyroid disease, and Tuberculosis.     she presents to the ED due to pain to the left upper back since yesterday evening.  No known injury.  States she woke up around 3:00 a.m. with worsening pain to the back and numbness down the left arm.  She now notes pain behind her right eye and paresthesias both to the left arm and leg.  States she has never had symptoms like this in the past.  No vision changes.  No other symptoms currently.      HPI       PAST MEDICAL HISTORY  Past Medical History:   Diagnosis Date    Arthritis     Depression     Hyperlipidemia     Hypertension     2013    Syncope and collapse     Thyroid disease     Tuberculosis        SURGICAL HISTORY    Past Surgical History:   Procedure Laterality Date    BUNIONECTOMY Left      SECTION      COLONOSCOPY N/A 10/25/2023    Procedure: COLONOSCOPY;  Surgeon: Gadiel De MD;  Location: [facility];  Service: Endoscopy;  Laterality: N/A;    ESOPHAGOGASTRODUODENOSCOPY  2020    ESOPHAGOGASTRODUODENOSCOPY N/A 2020    Procedure: EGD (ESOPHAGOGASTRODUODENOSCOPY);  Surgeon: Rio Galeano MD;  Location: [facility];  Service: Endoscopy;  Laterality: N/A;    ESOPHAGOGASTRODUODENOSCOPY N/A 10/25/2023    Procedure: EGD (ESOPHAGOGASTRODUODENOSCOPY);  Surgeon: Gadiel De MD;  Location: [facility];  Service: Endoscopy;  Laterality: N/A;    HEMORRHOID SURGERY      KNEE SURGERY      right knee    thyroid removed      TONSILLECTOMY      TUBAL LIGATION         FAMILY HISTORY    Family History   Problem Relation  Name Age of Onset    Breast cancer Mother Willie Mae Brown Cooks     Cancer Mother Willie Mae Brown Cooks         breast cancer    Hypertension Mother Willie Mae Brown Cooks     Hyperlipidemia Father      Breast cancer Sister      Hypertension Brother Rio Garcia     Diabetes Maternal Grandmother Ellen Lopez     Diabetes Paternal Grandmother Allie Pressley     Cancer Maternal Aunt Marleni Vega     Drug abuse Maternal Aunt Araceli Garcia        SOCIAL HISTORY    Social History     Socioeconomic History    Marital status:    Tobacco Use    Smoking status: Never     Passive exposure: Never    Smokeless tobacco: Never   Substance and Sexual Activity    Alcohol use: Yes     Alcohol/week: 8.0 standard drinks of alcohol     Types: 3 Glasses of wine, 5 Cans of beer per week    Drug use: No    Sexual activity: Not Currently     Partners: Male     Birth control/protection: Condom, See Surgical Hx       MEDICATIONS  Current Outpatient Medications   Medication Instructions    acetaminophen (TYLENOL EXTRA STRENGTH) 1,000 mg, Oral, Every 6 hours PRN    buPROPion (WELLBUTRIN XL) 150 mg, Oral, Daily    calcium carbonate (OS-FLO) 500 mg calcium (1,250 mg) tablet 2 tablets, 2 times daily    diclofenac sodium (VOLTAREN) 2 g, Topical (Top), 3 times daily    fish oil-omega-3 fatty acids 300-1,000 mg capsule 2 g, Oral, Daily    gabapentin (NEURONTIN) 600 mg, Oral, 3 times daily    ketorolac (TORADOL) 10 mg, Oral, Every 6 hours PRN    levothyroxine (SYNTHROID) 137 mcg, Oral    methocarbamoL (ROBAXIN) 1,000 mg, Oral, 3 times daily    propranoloL (INDERAL) 60 mg, Oral, 2 times daily       ALLERGIES    Review of patient's allergies indicates:   Allergen Reactions    Paroxetine hcl Rash and Nausea And Vomiting         REVIEW OF SYSTEMS   Review of Systems       PHYSICAL EXAM    Reviewed Triage Note    VITAL SIGNS:   ED Triage Vitals [07/16/24 0614]   Enc Vitals Group      /88      Pulse 65      Resp 16      Temp 98.8 °F  "(37.1 °C)      Temp Source Oral      SpO2 100 %      Weight 150 lb      Height 5' 3"      Head Circumference       Peak Flow       Pain Score       Pain Loc       Pain Education       Exclude from Growth Chart        Patient Vitals for the past 24 hrs:   BP Temp Temp src Pulse Resp SpO2 Height Weight   07/16/24 1024 109/71 -- -- (!) 54 15 99 % -- --   07/16/24 0830 117/89 99.1 °F (37.3 °C) Oral 67 (!) 22 99 % -- --   07/16/24 0822 (!) 138/92 -- -- 65 14 99 % -- --   07/16/24 0751 (!) 138/92 -- -- 65 14 99 % -- --   07/16/24 0747 (!) 138/92 98.8 °F (37.1 °C) Oral 65 14 99 % -- --   07/16/24 0733 (!) 151/97 -- -- 65 20 96 % 5' 3" (1.6 m) 68 kg (149 lb 14.6 oz)   07/16/24 0722 (!) 151/97 -- -- 68 18 96 % -- --   07/16/24 0712 (!) 172/97 -- -- 67 16 99 % -- --   07/16/24 0614 137/88 98.8 °F (37.1 °C) Oral 65 16 100 % 5' 3" (1.6 m) 68 kg (150 lb)       Physical Exam    Nursing note and vitals reviewed.  Constitutional: She appears well-developed and well-nourished.   HENT:   Head: Normocephalic and atraumatic.   Eyes: EOM are normal. Pupils are equal, round, and reactive to light.   Neck: Neck supple.   Normal range of motion.  Cardiovascular:  Normal rate and regular rhythm.           Pulmonary/Chest: Breath sounds normal. No respiratory distress.   Abdominal: Abdomen is soft. There is no abdominal tenderness.   Musculoskeletal:         General: Normal range of motion.      Cervical back: Normal range of motion and neck supple.      Comments: Reproducible tenderness to palpation to left upper thoracic back.  No midline tenderness.     Neurological: She is alert and oriented to person, place, and time. A sensory deficit (subjective paresthesias to RUE and RLE) is present. No cranial nerve deficit. GCS score is 15. GCS eye subscore is 4. GCS verbal subscore is 5. GCS motor subscore is 6.   4/5 strength of the left upper and lower extremity which she states is secondary to elicitation of pain throughout the back.   Skin: " Skin is warm and dry.   Psychiatric: She has a normal mood and affect.          EKG     Interpreted by EM physician if performed:   Normal sinus rhythm. No ST elevation or depression.  T-wave inversion in III.  Normal intervals.  Rate of 66       ECG Results              ECG 12 lead (In process)        Collection Time Result Time QRS Duration OHS QTC Calculation    07/16/24 07:09:33 07/16/24 09:26:16 70 427                     In process by Interface, Lab In [facility] (07/16/24 09:26:22)                   Narrative:    Test Reason : R29.818,    Vent. Rate : 066 BPM     Atrial Rate : 066 BPM     P-R Int : 148 ms          QRS Dur : 070 ms      QT Int : 408 ms       P-R-T Axes : 050 003 -02 degrees     QTc Int : 427 ms    Normal sinus rhythm  Cannot rule out Anterior infarct ,age undetermined  Abnormal ECG  When compared with ECG of 06-AUG-2020 15:03,  Vent. rate has decreased BY  35 BPM    Referred By: [email or web address]   SELF           Confirmed By:                                       LABS  Pertinent labs reviewed. (See chart for details)   Labs Reviewed   TSH - Abnormal; Notable for the following components:       Result Value    TSH 0.223 (*)     All other components within normal limits   LIPID PANEL - Abnormal; Notable for the following components:    Triglycerides 337 (*)     All other components within normal limits   POCT GLUCOSE - Abnormal; Notable for the following components:    POCT Glucose 121 (*)     All other components within normal limits   ISTAT PROCEDURE - Abnormal; Notable for the following components:    POC PTINR 1.3 (*)     All other components within normal limits   CBC W/ AUTO DIFFERENTIAL   COMPREHENSIVE METABOLIC PANEL   PROTIME-INR   T4, FREE   POCT GLUCOSE, HAND-HELD DEVICE   ISTAT CREATININE         RADIOLOGY          Imaging Results              X-Ray Lumbar Spine Ap And Lateral (Final result)  Result time 07/16/24 09:13:42      Final result by Leland Pearl MD (07/16/24 09:13:42)                    Impression:      1. No acute displaced fracture or dislocation of the lumbar spine.      Electronically signed by: Leland Pearl MD  Date:    07/16/2024  Time:    09:13               Narrative:    EXAMINATION:  XR LUMBAR SPINE AP AND LATERAL    CLINICAL HISTORY:  low back pain with weakness to LLE;    TECHNIQUE:  AP, lateral and spot images were performed of the lumbar spine.    COMPARISON:  08/22/2023    FINDINGS:  Lateral imaging demonstrates adequate alignment of the lumbar spine without significant vertebral body height loss or disc space height loss.  There is lower lumbar facet arthropathy.  The sacral coccygeal segments are aligned and appear in similar orientation as compared to the previous exam.  AP spinal alignment is remarkable for dextroscoliotic curvature.  The bilateral sacroiliac joints are intact.                                       X-Ray Cervical Spine AP And Lateral (Final result)  Result time 07/16/24 09:15:08      Final result by Leland Pearl MD (07/16/24 09:15:08)                   Impression:      1. No acute displaced fracture or dislocation of the cervical spine.      Electronically signed by: Leland Pearl MD  Date:    07/16/2024  Time:    09:15               Narrative:    EXAMINATION:  XR CERVICAL SPINE AP LATERAL    CLINICAL HISTORY:  upper back pain with radiculopathy;    TECHNIQUE:  AP, lateral and open mouth views of the cervical spine were performed.    COMPARISON:  None.    FINDINGS:  Four views cervical spine.    Please note, C7 is obscured from view on lateral images.  Allowing for this, lateral imaging demonstrates adequate alignment of the cervical spine without significant vertebral body height loss.  There is mild disc space height loss involving C4-C5 and C5-C6.  The facet joints are aligned.  AP spinal alignment is unremarkable.  The visualized lung apices are grossly clear.  The odontoid is intact.  The lateral masses of C1 are in anatomic  relationship with C2.  Surgical changes are noted of the neck.                                       CT Head Without Contrast (Final result)  Result time 07/16/24 07:03:45      Final result by Jono Guerin MD (07/16/24 07:03:45)                   Impression:      No acute abnormality.      Electronically signed by: Jono Guerin MD  Date:    07/16/2024  Time:    07:03               Narrative:    EXAMINATION:  CT HEAD WITHOUT CONTRAST    CLINICAL HISTORY:  Neuro deficit, acute, stroke suspected;    TECHNIQUE:  Low dose axial CT images obtained throughout the head without intravenous contrast. Sagittal and coronal reconstructions were performed.    COMPARISON:  06/24/2021    FINDINGS:  Intracranial compartment:    Ventricles and sulci are normal in size for age without evidence of hydrocephalus. No extra-axial blood or fluid collections.    The brain parenchyma appears normal. No parenchymal mass, hemorrhage, edema or major vascular distribution infarct.    Skull/extracranial contents (limited evaluation): No fracture. Mastoid air cells are clear.Paranasal sinuses are essentially clear.                                        PROCEDURES    Procedures      ED COURSE & MEDICAL DECISION MAKING    Pertinent Labs & Imaging studies reviewed. (See chart for details and specific orders.)          Summary of review of records:   MRI of the lumbar spine shows mild degenerative changes of L5-S1.  Has been in physical therapy for sciatica associated with the lumbar spine and documentation chronic left-sided lower back pain.    Medical Decision Making  Amount and/or Complexity of Data Reviewed  Labs: ordered. Decision-making details documented in ED Course.  Radiology: ordered. Decision-making details documented in ED Course.    Risk  Prescription drug management.      Mary Alice Garcia is a 59 y.o. female who presents pain to the left upper back since yesterday evening with some associated paresthesias of the left arm now  with the left leg is well as well as headache with nausea and vomiting.  States she was not had symptoms like this in the past.    Differential includes but is not limited to radiculopathy, migraine, possible acute intracranial process          Medications   LIDOcaine 5 % patch 1 patch (1 patch Transdermal Patch Applied 7/16/24 0803)   ketorolac injection 15 mg (15 mg Intravenous Given 7/16/24 0802)   methocarbamoL tablet 1,000 mg (1,000 mg Oral Given 7/16/24 0825)   prochlorperazine injection Soln 10 mg (10 mg Intravenous Given 7/16/24 0825)       ED Course as of 07/16/24 1112   Tue Jul 16, 2024   0733 CT Head Without Contrast  No acute abnormality.      [HL]   0733 Discussed with Neurology, , feels that she initially woke up with symptoms around 3:00 a.m. and thus is outside of the window for tPA.  Feels that this has more likely radicular in nature or possibly even a component of stress due to becoming very tearful during exam. No additional recommendations from a stroke standpoint-advises spine imaging and following troponin.  [HL]   0755 Comprehensive metabolic panel  within normal limits  [HL]   0755 CBC W/ AUTO DIFFERENTIAL  within normal limits  [HL]   0755 INR: 1.0  within normal limits  [HL]   0755 POCT Glucose(!): 121  within normal limits  [HL]   0927 X-Ray Cervical Spine AP And Lateral  7 is obscured from view on lateral images.  Allowing for this, lateral imaging demonstrates adequate alignment of the cervical spine without significant vertebral body height loss.  There is mild disc space height loss involving C4-C5 and C5-C6.  The facet joints are aligned.  AP spinal alignment is unremarkable.  The visualized lung apices are grossly clear.  The odontoid is intact.  The lateral masses of C1 are in anatomic relationship with C2.  Surgical changes are noted of the neck. [HL]   0927 X-Ray Lumbar Spine Ap And Lateral  1. No acute displaced fracture or dislocation of the lumbar spine. [HL]   1045 On  reassessment, pain and symptoms have improved.  Does have some persistent pain to the left upper back with radiculopathy noted.  Did discuss hypertriglyceridemia and need for close primary care follow-up.  Reasons to return discussed and all questions addressed. [HL]      ED Course User Index  [HL] Jordyn Campuzano DO             FINAL IMPRESSION    Final diagnoses:  [R29.818] Acute focal neurological deficit  [M54.6] Acute left-sided thoracic back pain (Primary)  [M54.10] Radiculopathy, unspecified spinal region  [E78.1] Hypertriglyceridemia without hypercholesterolemia       DISPOSITION  Patient discharged in stable condition        ED Prescriptions       Medication Sig Dispense Start Date End Date Auth. Provider    ketorolac (TORADOL) 10 mg tablet Take 1 tablet (10 mg total) by mouth every 6 (six) hours as needed for Pain. 20 tablet 7/16/2024 7/21/2024 Jordyn Campuzano DO    methocarbamoL (ROBAXIN) 500 MG Tab Take 2 tablets (1,000 mg total) by mouth 3 (three) times daily. for 5 days 30 tablet 7/16/2024 7/21/2024 Jordyn Campuzano DO          Follow-up Information       Follow up With Specialties Details Why Contact Info    Kelley Badillo NP Family Medicine Schedule an appointment as soon as possible for a visit in 2 days  [address]  [address]  [facility] [id number] [phone]      [facility] Emergency Dept Emergency Medicine  As needed, If symptoms worsen [address] [facility] [address] [phone]              DISCLAIMER: This note was prepared with [employer] voice recognition transcription software. Garbled syntax, mangled pronouns, and other bizarre constructions may be attributed to that software system.             Jordyn Campuzano DO  07/16/24 8546

## 2024-07-16 NOTE — TELEMEDICINE CONSULT
[facility]  Vascular Neurology  Comprehensive Stroke Center  TeleVascular Neurology Acute Consultation Note        Consult Information  Consults    Consulting Provider: LUPE FLORENTINO   Current Providers  No providers found    Patient Location:  [facility] EMERGENCY DEPARTMENT Emergency Department    Spoke hospital nurse at bedside with patient assisting consultant.  Patient information was obtained from patient.       Stroke Documentation  Acute Stroke Times   Last Known Normal Date: 07/15/24  Last Known Normal Time: 1700  Stroke Team Called Date: 07/16/24  Stroke Team Called Time: 0704  Stroke Team Arrival Date: 07/16/24  Stroke Team Arrival Time: 0720 (no answer on cart)  CT Interpretation Time: 0715  Thrombolytic Therapy Recommended: No  Thrombectomy Recommended: No    NIH Scale:  1a. Level of Consciousness: 0-->Alert, keenly responsive  1b. LOC Questions: 0-->Answers both questions correctly  1c. LOC Commands: 0-->Performs both tasks correctly  2. Best Gaze: 0-->Normal  3. Visual: 0-->No visual loss  4. Facial Palsy: 0-->Normal symmetrical movements  5a. Motor Arm, Left: 0-->No drift, limb holds 90 (or 45) degrees for full 10 secs  5b. Motor Arm, Right: 0-->No drift, limb holds 90 (or 45) degrees for full 10 secs  6a. Motor Leg, Left: 0-->No drift, leg holds 30 degree position for full 5 secs  6b. Motor Leg, Right: 0-->No drift, leg holds 30 degree position for full 5 secs  7. Limb Ataxia: 0-->Absent  8. Sensory: 1-->Mild-to-moderate sensory loss, patient feels pinprick is less sharp or is dull on the affected side, or there is a loss of superficial pain with pinprick, but patient is aware of being touched  9. Best Language: 0-->No aphasia, normal  10. Dysarthria: 0-->Normal  11. Extinction and Inattention (formerly Neglect): 0-->No abnormality  Total (NIH Stroke Scale): 1      Modified Baxter:    [place] Coma Scale:     ABCD2 Score:    [id number] Score:    HAS -BLED Score:    ICH Score:   "  Hunt & Stone Classification:      Blood pressure 115/76, pulse (!) 54, temperature 99.1 °F (37.3 °C), temperature source Oral, resp. rate (!) 24, height 5' 3" (1.6 m), weight 68 kg (149 lb 14.6 oz), last menstrual period 2024, SpO2 100%, not currently breastfeeding.    Van Negative  In your opinion, this was a: Tier 2     Medical Decision Making  HPI:  59 y.o. female with h/o HTN presenting with chest pain, tingling.  Patient states L chest and shoulder pain started last night around 5 pm.  She woke up with facial and arm tingling around 3 am, which then got worse around 5 am.     Images personally reviewed and interpreted:  Study: Head CT  Study Interpretation: no acute findings     Assessment and plan:  60 y/o woman with complaints of L chest pain and tingling in L face and arm.  On exam she is tearful and reports pain when lifting L leg.  No facial droop or gaze abnormalities.  Not a candidate for TNK as outside window.  Recommend MRI brain for further evaluation, however given tingling and pain lower suspicion for acute stroke vs radicular process.    Lytics recommendation: Thrombolytic therapy not recommended due to Outside of treatment window   Thrombectomy recommendation: No; at this time symptoms not suggestive of large vessel occlusion  Placement recommendation: pending further studies               ROS  Physical Exam  Neurological:      Comments: Tearful  No facial droop  Decr sensation + tingling L face and L arm  No arm drift  Pain when lifting L leg       Past Medical History:   Diagnosis Date    Arthritis     Depression     Hyperlipidemia     Hypertension     2013    Syncope and collapse     Thyroid disease     Tuberculosis      Past Surgical History:   Procedure Laterality Date    BUNIONECTOMY Left      SECTION      COLONOSCOPY N/A 10/25/2023    Procedure: COLONOSCOPY;  Surgeon: Gadiel De MD;  Location: [facility];  Service: Endoscopy;  Laterality: N/A;    " ESOPHAGOGASTRODUODENOSCOPY  11/23/2020    ESOPHAGOGASTRODUODENOSCOPY N/A 11/23/2020    Procedure: EGD (ESOPHAGOGASTRODUODENOSCOPY);  Surgeon: Rio Galeano MD;  Location: [facility];  Service: Endoscopy;  Laterality: N/A;    ESOPHAGOGASTRODUODENOSCOPY N/A 10/25/2023    Procedure: EGD (ESOPHAGOGASTRODUODENOSCOPY);  Surgeon: Gadiel De MD;  Location: [facility] ENDO;  Service: Endoscopy;  Laterality: N/A;    HEMORRHOID SURGERY      KNEE SURGERY      right knee    thyroid removed      TONSILLECTOMY      TUBAL LIGATION       Family History   Problem Relation Name Age of Onset    Breast cancer Mother Willie Mae Brown Cooks     Cancer Mother Willie Mae Brown Cooks         breast cancer    Hypertension Mother Willie Mae Brown Cooks     Hyperlipidemia Father      Breast cancer Sister      Hypertension Brother Rio Jose     Diabetes Maternal Grandmother Phoebe Wordsworth     Diabetes Paternal Grandmother Allie Pressley     Cancer Maternal Aunt Marleni Curranar     Drug abuse Maternal Aunt Araceli Garcia        Diagnoses  Problem Noted   Tingling of Left Arm and Left Side of Face 7/16/2024       Tamiko Mendez MD      Emergent/Acute neurological consultation requested by spoke provider due to critical concerns for possible cerebrovascular event that could result in permanent loss of neurologic/bodily function, severe disability or death of this patient.  Immediate/timely evaluation by a highly prepared expert is paramount for optimal outcomes  High risk for neurological deterioration if not properly diagnosed  High risk for neurological deterioration if not treated promplty/as soon as possible  Complex diagnostic evaluation may be required (advanced imaging)  High risk treatment options (thrombolytics and/or thrombectomy)    Patient care was coordinated with spoke provider, including but not limted to    Discussing likely diagnosis/etiology of symptoms  Making recommendations for further diagnostic studies  Making  recommendations for intravenous thrombolytics or other advanced therapies  Making recommendations for disposition (admission/transfer for higher level of care)

## 2024-07-16 NOTE — ED NOTES
Report received from Marily WOO. Pt on monitor in room. Neuro assessment completed. Pt given water per request.

## 2024-07-16 NOTE — SUBJECTIVE & OBJECTIVE
HPI:  59 y.o. female with h/o HTN presenting with chest pain, tingling.  Patient states L chest and shoulder pain started last night around 5 pm.  She woke up with facial and arm tingling around 3 am, which then got worse around 5 am.     Images personally reviewed and interpreted:  Study: Head CT  Study Interpretation: no acute findings     Assessment and plan:  58 y/o woman with complaints of L chest pain and tingling in L face and arm.  On exam she is tearful and reports pain when lifting L leg.  No facial droop or gaze abnormalities.  Not a candidate for TNK as outside window.  Recommend MRI brain for further evaluation, however given tingling and pain lower suspicion for acute stroke vs radicular process.    Lytics recommendation: Thrombolytic therapy not recommended due to Outside of treatment window   Thrombectomy recommendation: No; at this time symptoms not suggestive of large vessel occlusion  Placement recommendation: pending further studies

## 2024-07-16 NOTE — ED TRIAGE NOTES
Pt reports having Left shoulder blade pain that radiates to her left arm causing numbness and tingling. Reports the pain worsens with position changes. Denies injury, Denies cardiac Hx outside of HTN No stroke symptoms noted

## 2024-07-16 NOTE — ED NOTES
Pt given paperwork and prescriptions. Pt stated understanding of dc and follow up instructions. Pt iv removed. Pt alert and oriented. Pt ambulatory at time of dc.

## 2024-07-16 NOTE — Clinical Note
"Mary Alice Negronen" Jose was seen and treated in our emergency department on 7/16/2024.  She may return to work on 07/17/2024.       If you have any questions or concerns, please don't hesitate to call.      Jordyn Campuzano, DO"

## 2024-07-16 NOTE — ED NOTES
Pt ambulatory to ED for L sided chest pain, radiating to L shoulder, L neck, down to L leg.  Pain started yesterday evening at 1700, worsening this am.  Pt endorses numbness and tingling to L side of face, LUE and bilateral feet.  Pt endorses SOB.  Denies blurred vision, endorses pain behind L eye.  Pt A/Ox4, voice and speech normal.  Motor deficits noted to LUE and LLE.  VSS.  Bed low/locked, siderails upx2. Pt agrees to plan of care.

## 2024-07-17 LAB
OHS QRS DURATION: 70 MS
OHS QTC CALCULATION: 427 MS

## 2024-07-26 ENCOUNTER — TELEPHONE (OUTPATIENT)
Dept: URGENT CARE | Facility: CLINIC | Age: 59
End: 2024-07-26
Payer: COMMERCIAL

## 2024-07-31 ENCOUNTER — CLINICAL SUPPORT (OUTPATIENT)
Dept: REHABILITATION | Facility: HOSPITAL | Age: 59
End: 2024-07-31
Payer: OTHER GOVERNMENT

## 2024-07-31 DIAGNOSIS — M53.86 DECREASED RANGE OF MOTION OF LUMBAR SPINE: ICD-10-CM

## 2024-07-31 DIAGNOSIS — R29.898 DECREASED STRENGTH OF TRUNK AND BACK: Primary | ICD-10-CM

## 2024-07-31 PROCEDURE — 97530 THERAPEUTIC ACTIVITIES: CPT

## 2024-07-31 PROCEDURE — 97750 PHYSICAL PERFORMANCE TEST: CPT | Mod: 32

## 2024-07-31 NOTE — PLAN OF CARE
[facility]  Physical Therapy Lumbar Evaluation      Name: Mary Alice Garcia  Clinic Number: [id number]    Therapy Diagnosis:   Encounter Diagnoses   Name Primary?    Decreased strength of trunk and back Yes    Decreased range of motion of lumbar spine      Physician: Garrick Hernandez PA-C    Physician Orders: PT Eval and Treat  Medical Diagnosis from Referral: M54.42,G89.29 (ICD-10-CM) - Chronic left-sided low back pain with left-sided sciatica Y99.0 (ICD-10-CM) - Work related injury  Evaluation Date: 2024  Authorization Period Expiration: 2024  Plan of Care Expiration: 10/9/2024  Reassessment Due: 2024  Visit # / Visits authorized:   MedX testing visit 2    Time In: 1:16 PM (pt LATE)  Time Out: 2:00 PM  Total Billable Time: 44 minutes  INSURANCE and OUTCOMES: Fee for Service with FOTO Outcomes 1/3    Precautions: standard    Pattern of pain determined: 1 PEN, pattern 5    Subjective     Date of onset: 2023  History of current condition: Mary Alice reports work injury 2023.  She was loading a piece of equipment with heavy envelopes which caused left sided low back pain.  Pt was sent to physical therapy in April with minimal improvement.  Pt reports avoiding movement due to fear of increasing pain.       Medical History:   Past Medical History:   Diagnosis Date    Arthritis     Depression     Hyperlipidemia     Hypertension     2013    Syncope and collapse     Thyroid disease     Tuberculosis        Surgical History:   Mary Alice Garcia  has a past surgical history that includes  section; Tonsillectomy; Tubal ligation; Knee surgery; Hemorrhoid surgery; thyroid removed; Esophagogastroduodenoscopy (2020); Esophagogastroduodenoscopy (N/A, 2020); Bunionectomy (Left); Colonoscopy (N/A, 10/25/2023); and Esophagogastroduodenoscopy (N/A, 10/25/2023).    Medications:   Mary Alice has a current medication list which includes the following  "prescription(s): acetaminophen, bupropion, calcium carbonate, diclofenac sodium, fish oil-omega-3 fatty acids, gabapentin, levothyroxine, and propranolol.    Allergies:   Review of patient's allergies indicates:   Allergen Reactions    Paroxetine hcl Rash and Nausea And Vomiting        Imaging: X-ray   EXAMINATION:  XR LUMBAR SPINE AP AND LATERAL     CLINICAL HISTORY:  low back pain with weakness to LLE;     TECHNIQUE:  AP, lateral and spot images were performed of the lumbar spine.     COMPARISON:  08/22/2023     FINDINGS:  Lateral imaging demonstrates adequate alignment of the lumbar spine without significant vertebral body height loss or disc space height loss.  There is lower lumbar facet arthropathy.  The sacral coccygeal segments are aligned and appear in similar orientation as compared to the previous exam.  AP spinal alignment is remarkable for dextroscoliotic curvature.  The bilateral sacroiliac joints are intact.     Impression:     1. No acute displaced fracture or dislocation of the lumbar spine.    Prior Therapy: yes  Prior Treatment: physical therapy   Social History:  lives alone in [facility], [address] [place]  Occupation: [employer], currently light duty  Leisure: none      Prior Level of Function: independent  Current Level of Function: independent  DME owned/used: treadmill  Gym Membership: no    Pain:  Current 7/10, worst 10/10, best 7/10   Location: left low back and leg  Description: stinging  Aggravating Factors: Standing and Bending  Easing Factors:  ice, sitting  Disturbed Sleep: yes    Pattern of pain questions:  1.  Where is your pain the worst? back  2.  Is your pain constant or intermittent? constant  3.  Does bending forward make your typical pain worse? yes  4.  Since the start of your back pain, has there been a change in your bowel or bladder? no  5.  What can't you do now that you use to be able to do? Work duties    Pts goals: "pain free"    Red Flag Screening:   Cough/Sneeze Strain: (+) (cough " but not sneeze)  Bladder/Bowel: (--)  Falls: (--)  Night pain: (--)  Unexplained weight loss: (--)  General health: fair    Objective      Postural examination/scapula alignment: Rounded shoulder, Head forward, and increased lumbar lordosis / anterior pelvic tilt  Joint integrity: Firm end feeling  Skin integrity:WNL   Edema: None  Correction of posture: better with lumbar roll  Sitting: poor  Standing: fair    MOVEMENT LOSS - Lumbar   Norms ROM Loss Initial   Flexion Fingers touch toes, sacral angle >/= 70 deg, uniform spinal curvature, posterior weight shift  minimal loss, self limiting due to pain   Extension ASIS surpasses toes, spine of scapulae surpasses heels, uniform spinal curve moderate loss   Side glide Right  minimal loss   Side glide Left  minimal loss   Rotation Right PT observes contralateral shoulder moderate loss   Rotation Left PT observes contralateral shoulder moderate loss     Lower Extremity Strength  Right LE  Left LE    Hip flexion: 4+/5 Hip flexion: 4-/5 (pain)   Hip extension:  4/5 Hip extension: 4-/5 (pain)   Hip abduction: 4/5 Hip abduction: 4-/5 (pain)   Hip adduction:  4/5 Hip adduction:  4/5   Hip External Rotation 4+/5 Hip External Rotation 4/5   Hip Internal rotation   4+/5 Hip Internal rotation 4/5   Knee Flexion 4+/5 Knee Flexion 4/5 (pain)   Knee Extension 4+/5 Knee Extension 4/5 (pain)   Ankle dorsiflexion: 5/5 Ankle dorsiflexion: 5/5   Ankle plantarflexion: 4+/5 Ankle plantarflexion: 4+/5     GAIT:  Assistive Device used: none  Level of Assistance: independent  Patient displays the following gait deviations: no gait deviations observed.     Special Tests:   Test Name  Test Result   Prone Instability Test (+)   SI Joint Provocation Test (+) pt endorses left sided pain with all motion   Straight Leg Raise (--)   Neural Tension Test (--)   Crossed Straight Leg Raise (--)   Walking on toes Able   Walking on heels  Able     NEUROLOGICAL SCREENING:     Sensory deficits: intact to  light touch    Reflexes:    Left Right   Patella Tendon 1+ 1+   Achilles Tendon 1+ 1+   Babinski  NT NT   Clonus (--) (--)     REPEATED TEST MOVEMENTS:    Baseline symptoms:  Repeated Flexion in Standing worse   Repeated Extension in Standing worse   Repeated Flexion in lying worse   Repeated Extension in lying  worse       STATIC TESTS and other movements:   Prone lie no effect   Prone lie on elbows worse   Sitting slouched  worse   Sitting erect better   Standing slouched worse   Standing erect  better   Lying prone in extension  NT   Long sitting   NT   Sustained flexion worse   Sustained prone using mat NT     Lumbar testing Visit 2      Intake Outcome Measure for FOTO Lumbar Survey    Therapist reviewed FOTO scores for Mary Alice Garcia on 7/31/2024.   FOTO report - see Media section or FOTO account episode details.    Intake Score: 47% functional ability  Goal 51% functional ability    NDI: 38% disability            Treatment     Total Treatment time separate from Evaluation: 15 minutes    Mary Alice received therapeutic exercises to develop/improve posture, lumbar ROM, strength, and muscular endurance for 5 minutes including the following exercises:     LTR x 10  DKTC x 10  Open books x 10    Written Home Exercises Provided: yes.    HEP AS FOLLOWS:  LTR, DKTC, open books    Exercises were reviewed and Mary Alice was able to demonstrate them prior to the end of the session. Mary Alice demonstrated good  understanding of the education provided.     See EMR under Patient Instructions for exercises provided 7/31/2024.    Mary Alice received the following:     MedX Testing:  MedX testing to be performed next visit        7/31/2024     4:43 PM   [facility]   Visit Number 1   VAS Pain Rating 7         Therapeutic Education/Activity provided for 10 minutes:   - Patient was given an [facility] Visit 1 handout which discusses the following:  - what to expect in therapy  - an overview of the program, including health  coaching and wellness  - importance of spinal hygiene, proper posture, lifting mechanics, sleep quality, and nutrition/hydration   - Nasim roll trialed, recommended, and purchase information was provided.  - Patient received a handout regarding anticipated muscular soreness following the isometric test and strategies for management were reviewed with patient including stretching, using ice and scheduled rest.   - Patient received verbal education on the following:   - Healthy [facility] program   - purpose of the isometric test  - safe progression of lumbar strengthening, wellness approach, and systemic strengthening.   - safe usage of MedX machine and testing protocols.    - initiated education about chronic pain / pain neuroscience education    Mary Alice received cold pack for 0 minutes to low back in Z-lie.    Assessment     Mary Alice is a 59 y.o. female referred to [facility] with a medical diagnosis of M54.42,G89.29 (ICD-10-CM) - Chronic left-sided low back pain with left-sided sciatica Y99.0 (ICD-10-CM) - Work related injury. Pt presents with chronic pain, fear of movement, decreased strength and range of motion in lumbar spine, increased anterior pelvic tilt and impaired flexibility of soft tissue in lumbar spine and bilateral hips, decreased left lower extremity strength, impaired endurance and overall activity tolerance.  Isometric strength testing will be performed next visit.  All of the above noted supports potential lumbar classification as a pattern 1 PEN  and pattern 5 with recurrent/or consistent symptoms, thus pt is a good candidate for the Healthy [facility] Program. Pt would benefit from LE and trunk mobility training, stability training,  improved cardiovascular and muscular endurance, neuromuscular re-education for posture, coordination, and muscular recruitment and education on positional offloading techniques to decrease the intensity and frequency of flare-ups.       Pain Pattern: 1 PEN, pattern 5         Pt prognosis is Good.     Pt will benefit from skilled outpatient Physical Therapy to address the deficits stated above and in the chart below, to provide pt/family education, and to maximize pt's level of independence. Based on the above history and physical examination an active physical therapy program is recommended.      Plan of care discussed with patient: Yes  Pt's spiritual, cultural and educational needs considered and patient is agreeable to the plan of care and goals as stated below:     Anticipated Barriers for therapy: chronicity of impairments    PT Evaluation Completed? Yes    Medical necessity is demonstrated by the following problem list:    History  Co-morbidities and personal factors that may impact the plan of care [x] LOW: no personal factors / co-morbidities  [] MODERATE: 1-2 personal factors / co-morbidities  [] HIGH: 3+ personal factors / co-morbidities    Moderate / High Support Documentation:   Co-morbidities affecting plan of care: none    Personal Factors:   coping style     Examination  Body Structures and Functions, activity limitations and participation restrictions that may impact the plan of care [] LOW: addressing 1-2 elements  [x] MODERATE: 3+ elements  [] HIGH: 4+ elements (please support below)    Moderate / High Support Documentation:     Clinical Presentation [x] LOW: stable  [] MODERATE: Evolving  [] HIGH: Unstable     Decision Making/ Complexity Score: low         GOALS: Pt is in agreement with the following goals.    Short term goals:  6 weeks or 10 visits   - Pt will demonstrate increased lumbar MedX ROM by at least 3 degrees from the initial ROM value with improvements noted in functional ROM and ability to perform ADLs. Appropriate and Ongoing  - Pt will demonstrate increased MedX average isometric strength value by 25% from initial test resulting in improved ability to perform bending, lifting, and carrying activities safely, confidently. Appropriate and Ongoing  -  Pt will report a reduction in worst pain score by 1-2 points for improved tolerance for work duties. Appropriate and Ongoing  - Pt able to perform HEP correctly with minimal cueing or supervision from therapist to encourage independent management of symptoms. Appropriate and Ongoing    Long term goals: 10 weeks or 20 visits   - Pt will demonstrate increased lumbar MedX ROM by at least 9 degrees from initial ROM value, resulting in improved ability to perform functional forward bending while standing and sitting. Appropriate and Ongoing  - Pt will demonstrate increased MedX average isometric strength value by 40% from initial test resulting in improved ability to perform bending, lifting, and carrying activities safely and confidently. Appropriate and Ongoing  - Pt to demonstrate ability to independently control and reduce their pain through posture positioning and mechanical movements throughout a typical day. Appropriate and Ongoing  - Pt will demonstrate reduced pain and improved functional outcomes as reported on the FOTO by reaching an intake score of >/= 51% functional ability in order to demonstrate subjective improvement in patient's condition. . Appropriate and Ongoing  - Pt will demonstrate independence with the HEP at discharge. Appropriate and Ongoing  - Pt will tolerate recreational walking for at least 30 minutes without increase in low back pain.  Appropriate and Ongoing    Plan     Outpatient physical therapy 2x week for 10 weeks or 20 visits to include the following:   - Patient education  - Therapeutic exercise  - Manual therapy  - Performance testing   - Neuromuscular Re-education  - Therapeutic activity   - Modalities    Pt may be seen by PTA as part of the rehabilitation team.     Therapist: Cony Rendon, PT  7/31/2024

## 2024-08-14 ENCOUNTER — CLINICAL SUPPORT (OUTPATIENT)
Dept: REHABILITATION | Facility: HOSPITAL | Age: 59
End: 2024-08-14
Payer: OTHER GOVERNMENT

## 2024-08-14 DIAGNOSIS — M53.86 DECREASED RANGE OF MOTION OF LUMBAR SPINE: ICD-10-CM

## 2024-08-14 DIAGNOSIS — R29.898 DECREASED STRENGTH OF TRUNK AND BACK: Primary | ICD-10-CM

## 2024-08-14 PROCEDURE — 97110 THERAPEUTIC EXERCISES: CPT

## 2024-08-14 PROCEDURE — 97750 PHYSICAL PERFORMANCE TEST: CPT

## 2024-08-14 NOTE — PROGRESS NOTES
" [facility] Treatment      Name: Mary Alice Garcia  [facility] Number: [id number]    Therapy Diagnosis:   Encounter Diagnoses   Name Primary?    Decreased strength of trunk and back Yes    Decreased range of motion of lumbar spine      Physician: Garrick Hernandez PA-C    Visit Date: 8/14/2024    Physician Orders: PT Eval and Treat  Medical Diagnosis from Referral: M54.42,G89.29 (ICD-10-CM) - Chronic left-sided low back pain with left-sided sciatica Y99.0 (ICD-10-CM) - Work related injury  Evaluation Date: 7/31/2024  Authorization Period Expiration: 9/13/2024  Plan of Care Expiration: 10/9/2024  Reassessment Due: 8/31/2024  Visit # / Visits authorized: 2/20 (2/12 authorized by )  MedX testing visit 2    Time In: 11:00AM  Time Out: 12:00PM  Total Billable Time: 55 minutes  INSURANCE and OUTCOMES: Fee for Service with FOTO Outcomes 1/3    Precautions: Standard    Pattern of pain determined: 1 PEN, pattern 5    Subjective   Mary Alice reports feeling okay today, moderate pain level. No adverse effects from evaluation. 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      Patient reports tolerating previous visit no adverse effects  Patient reports their pain to be 5/10 on a 0-10 scale with 0 being no pain and 10 being the worst pain imaginable.  Pain Location: left low back and leg     Work and leisure: [employer], currently light duty / none  Pt goals: "pain free"    Objective     Baseline IM Testing Results:      Initial: Midpoint: Final:   ROM 0-42 deg     Max Peak Torque 88      Min Peak Torque 47      Flex/Ext Ratio 1.9:1     % below normative data -48%     % gain from initial N/a       Outcomes:  Initial score:  Visit 5 score:  Goal:      Treatment    Mary Alice received the treatments listed below:      MedX testing performed day 2: Patient  received neuromuscular education to engage spinal musculature correctly for motor control and engagement of " musculature for 15 minutes including the MedX exercise component and practice and standard testing. MedX dynamic exercise and baseline isometric test performed with instructions to guide the patient safely through the testing procedure. Patient instructed to perform isometric test correctly and safely while building to an optimal force with a pain-free effort. Patient also instructed that they should feel support/pressure from MedX restraints but no pain/discomfort, and encouraged to report any pain to therapist. Patient demonstrated appropriate understanding of information and tolerance of test.  Education regarding purpose of test, safety during test given, and reviewed possible more soreness and strategies.            8/14/2024    11:06 AM   [facility]   Visit Number 2   VAS Pain Rating 5   Treadmill Time (in min.) 5 min   Lumbar Stretches - Slouch Overcorrection 10   Flexion in Lying 10   Lumbar Extension Seat Pad 0   Femur Restraint 5   Top Dead Center 24   Counterweight 191   Lumbar Flexion 42   Lumbar Extension 0   Lumbar Peak Torque 88 ft. lbs.   Min Torque 47   Test Percent Below Normative Data 48 %   Ice - Z Lie (in min.) 5      therapeutic exercises to develop strength, endurance, ROM, flexibility, posture, and core stabilization for 45 minutes including:  LTR x 10  DKTC with SB x 10  PPT cueing to avoid breath hold x10  Bridges 2x10  Clamshell R TB x10  Open books x 10  SOC x10    Peripheral muscle strengthening which included 1 set of 15-20 repetitions at a slow, controlled 10-13 second per rep pace focused on strengthening supporting musculature for improved body mechanics and functional mobility.  Pt and therapist focused on proper form during treatment to ensure optimal strengthening of each targeted muscle group.  Machines were utilized including torso rotation, leg press, hip abd and hip add, leg ext.  Leg curl, triceps, biceps, chest and row added visit 3      cold pack for 5 minutes to  low back in z-lie.    Home Exercises Provided and Patient Education Provided   Home exercises include:LTR, DKTC, open book  Cardio program:visit 5  Lifting education date:visit 11  Posture/Lumbar roll: recommended  Fridge Magnet Discharge handout (date given):  Equipment at home/gym membership: treadmill/no      Education provided:   - HEP  - POC  - Purpose and procedure for MedX isometric testing    Written Home Exercises Provided: Patient instructed to cont prior HEP.  Exercises were reviewed and Mary Alice was able to demonstrate them prior to the end of the session.  Mary Alice demonstrated good  understanding of the education provided.     See EMR under Patient Instructions for exercises provided prior visit.          Assessment     Pt presents to second healthy back visit reporting moderate levels of pain today. She was able to demo HEP with Min VC for form. Added PPT, bridges, and clamshells today for lumbopelvic stability and motor control, which she tolerated well. Pt received isometric testing today on  Lumbar MedX. She tested at 48% below age norms, indicating weakness of lumbar musculature. Begin dynamic strengthening next visit at 50% of max torque.  Pt was also able to complete half of the peripheral strengthening exercises without increased discomfort and will complete the complete circuit next visit as tolerated.    Patient is making good progress towards established goals.  Pt will continue to benefit from skilled outpatient physical therapy to address the deficits stated in the impairment chart, provide pt/family education and to maximize pt's level of independence in the home and community environment.     Anticipated Barriers for therapy: chronicity of impairments   Pt's spiritual, cultural and educational needs considered and pt agreeable to plan of care and goals as stated below:         Goals:   Short term goals:  6 weeks or 10 visits   - Pt will demonstrate increased lumbar MedX ROM by at least 3  degrees from the initial ROM value with improvements noted in functional ROM and ability to perform ADLs. Appropriate and Ongoing  - Pt will demonstrate increased MedX average isometric strength value by 25% from initial test resulting in improved ability to perform bending, lifting, and carrying activities safely, confidently. Appropriate and Ongoing  - Pt will report a reduction in worst pain score by 1-2 points for improved tolerance for work duties. Appropriate and Ongoing  - Pt able to perform HEP correctly with minimal cueing or supervision from therapist to encourage independent management of symptoms. Appropriate and Ongoing     Long term goals: 10 weeks or 20 visits   - Pt will demonstrate increased lumbar MedX ROM by at least 9 degrees from initial ROM value, resulting in improved ability to perform functional forward bending while standing and sitting. Appropriate and Ongoing  - Pt will demonstrate increased MedX average isometric strength value by 40% from initial test resulting in improved ability to perform bending, lifting, and carrying activities safely and confidently. Appropriate and Ongoing  - Pt to demonstrate ability to independently control and reduce their pain through posture positioning and mechanical movements throughout a typical day. Appropriate and Ongoing  - Pt will demonstrate reduced pain and improved functional outcomes as reported on the FOTO by reaching an intake score of >/= 51% functional ability in order to demonstrate subjective improvement in patient's condition. . Appropriate and Ongoing  - Pt will demonstrate independence with the HEP at discharge. Appropriate and Ongoing  - Pt will tolerate recreational walking for at least 30 minutes without increase in low back pain.  Appropriate and Ongoing      Plan   Continue with established Plan of Care towards established PT goals.       Therapist: Dania Moura, PT  8/14/2024

## 2024-08-20 ENCOUNTER — LAB VISIT (OUTPATIENT)
Dept: LAB | Facility: HOSPITAL | Age: 59
End: 2024-08-20
Attending: NURSE PRACTITIONER
Payer: COMMERCIAL

## 2024-08-20 ENCOUNTER — OFFICE VISIT (OUTPATIENT)
Dept: PRIMARY CARE CLINIC | Facility: CLINIC | Age: 59
End: 2024-08-20
Payer: COMMERCIAL

## 2024-08-20 VITALS
OXYGEN SATURATION: 99 % | SYSTOLIC BLOOD PRESSURE: 120 MMHG | RESPIRATION RATE: 16 BRPM | DIASTOLIC BLOOD PRESSURE: 78 MMHG | HEIGHT: 63 IN | BODY MASS INDEX: 28.13 KG/M2 | HEART RATE: 89 BPM | WEIGHT: 158.75 LBS

## 2024-08-20 DIAGNOSIS — F43.9 STRESS: ICD-10-CM

## 2024-08-20 DIAGNOSIS — E03.9 ACQUIRED HYPOTHYROIDISM: ICD-10-CM

## 2024-08-20 DIAGNOSIS — I10 ESSENTIAL HYPERTENSION: ICD-10-CM

## 2024-08-20 DIAGNOSIS — F41.9 ANXIETY: ICD-10-CM

## 2024-08-20 DIAGNOSIS — S00.521A BLISTER OF LIP WITHOUT INFECTION, INITIAL ENCOUNTER: ICD-10-CM

## 2024-08-20 DIAGNOSIS — R21 SKIN RASH: ICD-10-CM

## 2024-08-20 DIAGNOSIS — R73.01 IFG (IMPAIRED FASTING GLUCOSE): ICD-10-CM

## 2024-08-20 DIAGNOSIS — M53.87 SCIATICA ASSOCIATED WITH DISORDER OF LUMBOSACRAL SPINE: ICD-10-CM

## 2024-08-20 DIAGNOSIS — I10 ESSENTIAL HYPERTENSION: Primary | ICD-10-CM

## 2024-08-20 PROCEDURE — 3078F DIAST BP <80 MM HG: CPT | Mod: CPTII,S$GLB,, | Performed by: NURSE PRACTITIONER

## 2024-08-20 PROCEDURE — 3044F HG A1C LEVEL LT 7.0%: CPT | Mod: CPTII,S$GLB,, | Performed by: NURSE PRACTITIONER

## 2024-08-20 PROCEDURE — 84443 ASSAY THYROID STIM HORMONE: CPT | Performed by: NURSE PRACTITIONER

## 2024-08-20 PROCEDURE — 1159F MED LIST DOCD IN RCRD: CPT | Mod: CPTII,S$GLB,, | Performed by: NURSE PRACTITIONER

## 2024-08-20 PROCEDURE — 3074F SYST BP LT 130 MM HG: CPT | Mod: CPTII,S$GLB,, | Performed by: NURSE PRACTITIONER

## 2024-08-20 PROCEDURE — 84439 ASSAY OF FREE THYROXINE: CPT | Performed by: NURSE PRACTITIONER

## 2024-08-20 PROCEDURE — 99214 OFFICE O/P EST MOD 30 MIN: CPT | Mod: S$GLB,,, | Performed by: NURSE PRACTITIONER

## 2024-08-20 PROCEDURE — 85025 COMPLETE CBC W/AUTO DIFF WBC: CPT | Performed by: NURSE PRACTITIONER

## 2024-08-20 PROCEDURE — 99999 PR PBB SHADOW E&M-EST. PATIENT-LVL IV: CPT | Mod: PBBFAC,,, | Performed by: NURSE PRACTITIONER

## 2024-08-20 PROCEDURE — 3008F BODY MASS INDEX DOCD: CPT | Mod: CPTII,S$GLB,, | Performed by: NURSE PRACTITIONER

## 2024-08-20 PROCEDURE — 36415 COLL VENOUS BLD VENIPUNCTURE: CPT | Performed by: NURSE PRACTITIONER

## 2024-08-20 PROCEDURE — 1160F RVW MEDS BY RX/DR IN RCRD: CPT | Mod: CPTII,S$GLB,, | Performed by: NURSE PRACTITIONER

## 2024-08-20 RX ORDER — SERTRALINE HYDROCHLORIDE 50 MG/1
TABLET, FILM COATED ORAL
Qty: 30 TABLET | Refills: 11 | Status: SHIPPED | OUTPATIENT
Start: 2024-08-20

## 2024-08-20 RX ORDER — SERTRALINE HYDROCHLORIDE 50 MG/1
50 TABLET, FILM COATED ORAL DAILY
Qty: 30 TABLET | Refills: 11 | Status: SHIPPED | OUTPATIENT
Start: 2024-08-20 | End: 2024-08-20

## 2024-08-20 RX ORDER — SEMAGLUTIDE 0.25 MG/.5ML
0.25 INJECTION, SOLUTION SUBCUTANEOUS
Qty: 0.5 ML | Refills: 4 | Status: SHIPPED | OUTPATIENT
Start: 2024-08-20 | End: 2024-09-03

## 2024-08-20 NOTE — PROGRESS NOTES
[facility] Note    Chief Complaint      Chief Complaint   Patient presents with    Rash    Follow-up     History of Present Illness      Mary Alice Garcia is a 59 y.o. female patient who presents today for problem visit. Weight gain, anxiety/stress    Labs ordered  Weight gain  Will trial wegovy    Will trial zoloft  Health Maintenance   Topic Date Due    TETANUS VACCINE  Never done    Shingles Vaccine (1 of 2) Never done    Mammogram  2024    Lipid Panel  2029    Colorectal Cancer Screening  10/25/2033    Hepatitis C Screening  Completed       Past Medical History:   Diagnosis Date    Arthritis     Depression     Hyperlipidemia     Hypertension         Syncope and collapse     Thyroid disease     Tuberculosis        Past Surgical History:   Procedure Laterality Date    BUNIONECTOMY Left      SECTION      COLONOSCOPY N/A 10/25/2023    Procedure: COLONOSCOPY;  Surgeon: Gadiel De MD;  Location: [facility];  Service: Endoscopy;  Laterality: N/A;    ESOPHAGOGASTRODUODENOSCOPY  2020    ESOPHAGOGASTRODUODENOSCOPY N/A 2020    Procedure: EGD (ESOPHAGOGASTRODUODENOSCOPY);  Surgeon: Rio Galeano MD;  Location: [facility];  Service: Endoscopy;  Laterality: N/A;    ESOPHAGOGASTRODUODENOSCOPY N/A 10/25/2023    Procedure: EGD (ESOPHAGOGASTRODUODENOSCOPY);  Surgeon: Gadiel De MD;  Location: [facility];  Service: Endoscopy;  Laterality: N/A;    HEMORRHOID SURGERY      KNEE SURGERY      right knee    thyroid removed      TONSILLECTOMY      TUBAL LIGATION         family history includes Breast cancer in her mother and sister; Cancer in her maternal aunt and mother; Diabetes in her maternal grandmother and paternal grandmother; Drug abuse in her maternal aunt; Hyperlipidemia in her father; Hypertension in her brother and mother.    Social History     Tobacco Use    Smoking status: Never     Passive exposure: Never    Smokeless tobacco: Never   Substance  Use Topics    Alcohol use: Yes     Alcohol/week: 8.0 standard drinks of alcohol     Types: 3 Glasses of wine, 5 Cans of beer per week    Drug use: No       Review of Systems   Constitutional:  Negative for chills and fever.   HENT:  Negative for congestion, sinus pain and sore throat.    Eyes:  Negative for blurred vision.   Respiratory:  Negative for cough, shortness of breath and wheezing.    Cardiovascular:  Negative for chest pain, palpitations and leg swelling.   Gastrointestinal:  Negative for abdominal pain, constipation, diarrhea, nausea and vomiting.   Genitourinary:  Negative for dysuria.   Musculoskeletal:  Negative for myalgias.   Skin:  Negative for rash.   Neurological:  Negative for dizziness, weakness and headaches.   Psychiatric/Behavioral:  Negative for depression. The patient is nervous/anxious.         Outpatient Encounter Medications as of 8/20/2024   Medication Sig Dispense Refill    acetaminophen (TYLENOL EXTRA STRENGTH) 500 MG tablet Take 2 tablets (1,000 mg total) by mouth every 6 (six) hours as needed for Pain. 60 tablet 1    calcium carbonate (OS-FLO) 500 mg calcium (1,250 mg) tablet Take 2 tablets by mouth 2 (two) times daily.        diclofenac sodium (VOLTAREN) 1 % Gel Apply 2 g topically 3 (three) times daily. 100 g 2    fish oil-omega-3 fatty acids 300-1,000 mg capsule Take 2 g by mouth once daily.      gabapentin (NEURONTIN) 600 MG tablet Take 1 tablet (600 mg total) by mouth 3 (three) times daily. 90 tablet 1    [DISCONTINUED] levothyroxine (SYNTHROID) 137 MCG Tab tablet Take 137 mcg by mouth.      [DISCONTINUED] propranoloL (INDERAL) 60 MG tablet Take 1 tablet (60 mg total) by mouth 2 (two) times daily. 180 tablet 3    sertraline (ZOLOFT) 50 MG tablet Start with 1/2 tab by mouth x 1 week, then increase to 1 tab daily 30 tablet 11    [DISCONTINUED] buPROPion (WELLBUTRIN XL) 150 MG TB24 tablet Take 1 tablet (150 mg total) by mouth once daily. 30 tablet 11    [DISCONTINUED]  "semaglutide, weight loss, (WEGOVY) 0.25 mg/0.5 mL PnIj Inject 0.25 mg into the skin every 7 days. 0.5 mL 4    [DISCONTINUED] sertraline (ZOLOFT) 50 MG tablet Take 1 tablet (50 mg total) by mouth once daily. 30 tablet 11     No facility-administered encounter medications on file as of 8/20/2024.        Review of patient's allergies indicates:   Allergen Reactions    Paroxetine hcl Rash and Nausea And Vomiting       Physical Exam      Vital Signs  Pulse: 89  Resp: 16  SpO2: 99 %  BP: 120/78  BP Location: Right arm  Patient Position: Sitting  Height and Weight  Height: 5' 3" (160 cm)  Weight: 72 kg (158 lb 11.7 oz)  BSA (Calculated - sq m): 1.79 sq meters  BMI (Calculated): 28.1  Weight in (lb) to have BMI = 25: 140.8    Physical Exam  Vitals and nursing note reviewed.   Constitutional:       General: She is not in acute distress.     Appearance: Normal appearance. She is well-developed. She is not ill-appearing.   HENT:      Head: Normocephalic and atraumatic.      Right Ear: External ear normal.      Left Ear: External ear normal.   Eyes:      Conjunctiva/sclera: Conjunctivae normal.      Pupils: Pupils are equal, round, and reactive to light.   Neck:      Thyroid: No thyromegaly.      Vascular: No JVD.      Trachea: No tracheal deviation.   Cardiovascular:      Rate and Rhythm: Normal rate and regular rhythm.      Heart sounds: Normal heart sounds. No murmur heard.  Pulmonary:      Effort: Pulmonary effort is normal.      Breath sounds: Normal breath sounds.   Chest:      Chest wall: No tenderness.   Abdominal:      General: Bowel sounds are normal.      Palpations: Abdomen is soft.      Tenderness: There is no abdominal tenderness. There is no guarding.   Musculoskeletal:         General: Normal range of motion.      Cervical back: Normal range of motion and neck supple.   Lymphadenopathy:      Cervical: No cervical adenopathy.   Skin:     General: Skin is warm and dry.   Neurological:      General: No focal " deficit present.      Mental Status: She is alert and oriented to person, place, and time.   Psychiatric:         Mood and Affect: Mood normal.         Behavior: Behavior normal.         Thought Content: Thought content normal.         Judgment: Judgment normal.          Laboratory:  CBC:  Lab Results   Component Value Date    WBC 8.58 08/20/2024    RBC 4.47 08/20/2024    HGB 13.0 08/20/2024    HCT 39.4 08/20/2024     08/20/2024    MCV 88 08/20/2024    MCH 29.1 08/20/2024    MCHC 33.0 08/20/2024    MCHC 34.4 07/16/2024    MCHC 33.3 03/13/2024     CMP:  Lab Results   Component Value Date     07/16/2024    CALCIUM 9.9 07/16/2024    ALBUMIN 4.0 07/16/2024    PROT 8.1 07/16/2024     07/16/2024    K 3.8 07/16/2024    CO2 27 07/16/2024     07/16/2024    BUN 10 07/16/2024    ALKPHOS 73 07/16/2024    ALT 27 07/16/2024    AST 28 07/16/2024    BILITOT 0.8 07/16/2024    BILITOT 0.5 03/13/2024    BILITOT 0.4 07/18/2023     URINALYSIS:  Lab Results   Component Value Date    COLORU Yellow 10/15/2019    SPECGRAV <=1.005 (A) 10/15/2019    PHUR 7 11/08/2023    PROTEINUA Negative 10/15/2019    BACTERIA Rare 07/05/2012    NITRITE Negative 10/15/2019    LEUKOCYTESUR Negative 10/15/2019    UROBILINOGEN Negative 10/15/2019      LIPIDS:  Lab Results   Component Value Date    TSH 0.091 (L) 08/20/2024    TSH 0.223 (L) 07/16/2024    TSH 0.421 03/13/2024    HDL 44 07/16/2024    HDL 53 07/11/2023    HDL 45 08/14/2020    CHOL 191 07/16/2024    CHOL 218 (H) 07/11/2023    CHOL 202 (H) 08/14/2020    TRIG 337 (H) 07/16/2024    TRIG 142 07/11/2023    TRIG 107 08/14/2020    LDLCALC 79.6 07/16/2024    LDLCALC 136.6 07/11/2023    LDLCALC 135.6 08/14/2020    CHOLHDL 23.0 07/16/2024    CHOLHDL 24.3 07/11/2023    CHOLHDL 22.3 08/14/2020    NONHDLCHOL 147 07/16/2024    NONHDLCHOL 165 07/11/2023    NONHDLCHOL 157 08/14/2020    TOTALCHOLEST 4.3 07/16/2024    TOTALCHOLEST 4.1 07/11/2023    TOTALCHOLEST 4.5 08/14/2020     TSH:  Lab  Results   Component Value Date    TSH 0.091 (L) 08/20/2024    TSH 0.223 (L) 07/16/2024    TSH 0.421 03/13/2024     A1C:  Lab Results   Component Value Date    HGBA1C 5.7 (H) 03/13/2024    HGBA1C 5.4 07/11/2023    HGBA1C 5.6 08/14/2020         Assessment/Plan     Mary Alice Garcia is a 59 y.o.female with:    Essential hypertension  -     CBC Auto Differential; Future; Expected date: 08/20/2024  -     TSH; Future; Expected date: 08/20/2024  -     T4, Free; Future; Expected date: 08/20/2024  -     Discontinue: semaglutide, weight loss, (WEGOVY) 0.25 mg/0.5 mL PnIj; Inject 0.25 mg into the skin every 7 days.  Dispense: 0.5 mL; Refill: 4    Acquired hypothyroidism  -     CBC Auto Differential; Future; Expected date: 08/20/2024  -     TSH; Future; Expected date: 08/20/2024  -     T4, Free; Future; Expected date: 08/20/2024  -     Discontinue: semaglutide, weight loss, (WEGOVY) 0.25 mg/0.5 mL PnIj; Inject 0.25 mg into the skin every 7 days.  Dispense: 0.5 mL; Refill: 4    IFG (impaired fasting glucose)  -     CBC Auto Differential; Future; Expected date: 08/20/2024  -     TSH; Future; Expected date: 08/20/2024  -     T4, Free; Future; Expected date: 08/20/2024  -     Discontinue: semaglutide, weight loss, (WEGOVY) 0.25 mg/0.5 mL PnIj; Inject 0.25 mg into the skin every 7 days.  Dispense: 0.5 mL; Refill: 4    Stress  -     Discontinue: sertraline (ZOLOFT) 50 MG tablet; Take 1 tablet (50 mg total) by mouth once daily.  Dispense: 30 tablet; Refill: 11  -     CBC Auto Differential; Future; Expected date: 08/20/2024  -     TSH; Future; Expected date: 08/20/2024  -     T4, Free; Future; Expected date: 08/20/2024  -     Discontinue: semaglutide, weight loss, (WEGOVY) 0.25 mg/0.5 mL PnIj; Inject 0.25 mg into the skin every 7 days.  Dispense: 0.5 mL; Refill: 4  -     sertraline (ZOLOFT) 50 MG tablet; Start with 1/2 tab by mouth x 1 week, then increase to 1 tab daily  Dispense: 30 tablet; Refill: 11    Anxiety  -     Discontinue:  sertraline (ZOLOFT) 50 MG tablet; Take 1 tablet (50 mg total) by mouth once daily.  Dispense: 30 tablet; Refill: 11  -     CBC Auto Differential; Future; Expected date: 08/20/2024  -     TSH; Future; Expected date: 08/20/2024  -     T4, Free; Future; Expected date: 08/20/2024  -     Discontinue: semaglutide, weight loss, (WEGOVY) 0.25 mg/0.5 mL PnIj; Inject 0.25 mg into the skin every 7 days.  Dispense: 0.5 mL; Refill: 4  -     sertraline (ZOLOFT) 50 MG tablet; Start with 1/2 tab by mouth x 1 week, then increase to 1 tab daily  Dispense: 30 tablet; Refill: 11    Blister of lip without infection, initial encounter  -     Ambulatory referral/consult to Dermatology; Future; Expected date: 08/20/2024  -     CBC Auto Differential; Future; Expected date: 08/20/2024  -     TSH; Future; Expected date: 08/20/2024  -     T4, Free; Future; Expected date: 08/20/2024    Sciatica associated with disorder of lumbosacral spine  -     CBC Auto Differential; Future; Expected date: 08/20/2024  -     TSH; Future; Expected date: 08/20/2024  -     T4, Free; Future; Expected date: 08/20/2024  -     Discontinue: semaglutide, weight loss, (WEGOVY) 0.25 mg/0.5 mL PnIj; Inject 0.25 mg into the skin every 7 days.  Dispense: 0.5 mL; Refill: 4    Skin rash  -     Ambulatory referral/consult to Dermatology; Future; Expected date: 08/20/2024  -     CBC Auto Differential; Future; Expected date: 08/20/2024  -     TSH; Future; Expected date: 08/20/2024  -     T4, Free; Future; Expected date: 08/20/2024    BMI 28.0-28.9,adult          Health Maintenance Due   Topic Date Due    TETANUS VACCINE  Never done    Shingles Vaccine (1 of 2) Never done    Mammogram  07/24/2024    Influenza Vaccine (1) 09/01/2024    COVID-19 Vaccine (1 - 2023-24 season) Never done        I spent 34 minutes on the day of this encounter for preparing for, evaluating, treating, and managing this patient.      -Continue current medications and maintain follow up with specialists.   Return to clinic as needed for any concerns.  No follow-ups on file.       PAOLO DoverC  [facility]

## 2024-08-21 ENCOUNTER — PATIENT MESSAGE (OUTPATIENT)
Dept: PRIMARY CARE CLINIC | Facility: CLINIC | Age: 59
End: 2024-08-21
Payer: COMMERCIAL

## 2024-08-21 ENCOUNTER — CLINICAL SUPPORT (OUTPATIENT)
Dept: REHABILITATION | Facility: HOSPITAL | Age: 59
End: 2024-08-21
Payer: OTHER GOVERNMENT

## 2024-08-21 DIAGNOSIS — E03.9 ACQUIRED HYPOTHYROIDISM: Primary | ICD-10-CM

## 2024-08-21 DIAGNOSIS — M53.86 DECREASED RANGE OF MOTION OF LUMBAR SPINE: ICD-10-CM

## 2024-08-21 DIAGNOSIS — R29.898 DECREASED STRENGTH OF TRUNK AND BACK: Primary | ICD-10-CM

## 2024-08-21 LAB
BASOPHILS # BLD AUTO: 0.07 K/UL (ref 0–0.2)
BASOPHILS NFR BLD: 0.8 % (ref 0–1.9)
DIFFERENTIAL METHOD BLD: NORMAL
EOSINOPHIL # BLD AUTO: 0.2 K/UL (ref 0–0.5)
EOSINOPHIL NFR BLD: 2.4 % (ref 0–8)
ERYTHROCYTE [DISTWIDTH] IN BLOOD BY AUTOMATED COUNT: 12.2 % (ref 11.5–14.5)
HCT VFR BLD AUTO: 39.4 % (ref 37–48.5)
HGB BLD-MCNC: 13 G/DL (ref 12–16)
IMM GRANULOCYTES # BLD AUTO: 0.02 K/UL (ref 0–0.04)
IMM GRANULOCYTES NFR BLD AUTO: 0.2 % (ref 0–0.5)
LYMPHOCYTES # BLD AUTO: 3.5 K/UL (ref 1–4.8)
LYMPHOCYTES NFR BLD: 41.3 % (ref 18–48)
MCH RBC QN AUTO: 29.1 PG (ref 27–31)
MCHC RBC AUTO-ENTMCNC: 33 G/DL (ref 32–36)
MCV RBC AUTO: 88 FL (ref 82–98)
MONOCYTES # BLD AUTO: 0.7 K/UL (ref 0.3–1)
MONOCYTES NFR BLD: 7.8 % (ref 4–15)
NEUTROPHILS # BLD AUTO: 4.1 K/UL (ref 1.8–7.7)
NEUTROPHILS NFR BLD: 47.5 % (ref 38–73)
NRBC BLD-RTO: 0 /100 WBC
PLATELET # BLD AUTO: 285 K/UL (ref 150–450)
PMV BLD AUTO: 11.2 FL (ref 9.2–12.9)
RBC # BLD AUTO: 4.47 M/UL (ref 4–5.4)
T4 FREE SERPL-MCNC: 1.2 NG/DL (ref 0.71–1.51)
TSH SERPL DL<=0.005 MIU/L-ACNC: 0.09 UIU/ML (ref 0.4–4)
WBC # BLD AUTO: 8.58 K/UL (ref 3.9–12.7)

## 2024-08-21 PROCEDURE — 97110 THERAPEUTIC EXERCISES: CPT

## 2024-08-21 PROCEDURE — 97112 NEUROMUSCULAR REEDUCATION: CPT

## 2024-08-21 RX ORDER — LEVOTHYROXINE SODIUM 125 UG/1
125 TABLET ORAL
Qty: 30 TABLET | Refills: 11 | Status: SHIPPED | OUTPATIENT
Start: 2024-08-21 | End: 2025-08-21

## 2024-08-21 NOTE — PROGRESS NOTES
" [facility]      Name: Mary Alice Garcia  [facility] Number: [id number]    Therapy Diagnosis:   Encounter Diagnoses   Name Primary?    Decreased strength of trunk and back Yes    Decreased range of motion of lumbar spine      Physician: Garrick Hernandez PA-C    Visit Date: 8/21/2024    Physician Orders: PT Eval and Treat  Medical Diagnosis from Referral: M54.42,G89.29 (ICD-10-CM) - Chronic left-sided low back pain with left-sided sciatica Y99.0 (ICD-10-CM) - Work related injury  Evaluation Date: 7/31/2024  Authorization Period Expiration: 9/13/2024  Plan of Care Expiration: 10/9/2024  Reassessment Due: 8/31/2024  Visit # / Visits authorized: 2/20 (2/12 authorized by )  MedX testing visit 2    Time In: 8:30 AM  Time Out: 9:30 AM  Total Billable Time: 55 minutes (30 min 1:1)  INSURANCE and OUTCOMES: Fee for Service with FOTO Outcomes 1/3    Precautions: Standard    Pattern of pain determined: 1 PEN, pattern 5    Subjective   Mary Alice reports feeling okay today, moderate pain level.     Patient reports tolerating previous visit no adverse effects  Patient reports their pain to be 5/10 on a 0-10 scale with 0 being no pain and 10 being the worst pain imaginable.  Pain Location: left low back and leg     Work and leisure: [employer], currently light duty / none  Pt goals: "pain free"    Objective     Baseline IM Testing Results:      Initial: Midpoint: Final:   ROM 0-42 deg     Max Peak Torque 88      Min Peak Torque 47      Flex/Ext Ratio 1.9:1     % below normative data -48%     % gain from initial N/a       Outcomes:  Initial score: 47% functional ability   Visit 5 score:  Goal: 51% functional ability       Treatment    Mary Alice received the treatments listed below:      received neuromuscular education  to isolate and engage spinal stabilization musculature correctly for motor control and coordination to aid in function and posture for 10 minutes on the Medical Medx Machine.  Patient performed MedX " dynamic exercise with emphasis on spinal muscular control using pacer throughout  active range of motion. Therapist assisted patient in achieving optimal exertion for neural reeducation and endurance training by using the  [place] Exertion Rating scale, by instructing the patient to aim for mid range of exertion, performing 15-20 repetitions, slowly, correctly,and safely.          8/21/2024     9:24 AM   [facility]   Visit Number 3   VAS Pain Rating 5   Treadmill Time (in min.) 5 min   Lumbar Stretches - Slouch Overcorrection 10   Extension in Standing 10   Flexion in Lying 10   Lumbar Weight 44 lbs   Repetitions 16   Rating of Perceived Exertion 7   Ice - Z Lie (in min.) 0         therapeutic exercises to develop strength, endurance, ROM, flexibility, posture, and core stabilization for 45 minutes including:    LTR x 10  DKTC with SB x 10  PPT cueing to avoid breath hold x10  Bridges with RTB 2x10  Clamshell R TB x10  Open books x 10  SOC x10    Peripheral muscle strengthening which included 1 set of 15-20 repetitions at a slow, controlled 10-13 second per rep pace focused on strengthening supporting musculature for improved body mechanics and functional mobility.  Pt and therapist focused on proper form during treatment to ensure optimal strengthening of each targeted muscle group.  Machines were utilized including torso rotation, leg press, hip abd and hip add, leg ext.  Leg curl, triceps, biceps, chest and row added visit 3      cold pack for 5 minutes to low back in z-lie.    Home Exercises Provided and Patient Education Provided   Home exercises include:LTR, DKTC, open book  Cardio program:visit 5  Lifting education date:visit 11  Posture/Lumbar roll: recommended  Fridge Magnet Discharge handout (date given):  Equipment at home/gym membership: treadmill/no      Education provided:   - HEP  - POC  - Purpose and procedure for MedX isometric testing    Written Home Exercises Provided: Patient instructed to  cont prior HEP.  Exercises were reviewed and Mary Alice was able to demonstrate them prior to the end of the session.  Mary Alice demonstrated good  understanding of the education provided.     See EMR under Patient Instructions for exercises provided prior visit.          Assessment     Pt presents to visit reporting moderate levels of pain today.  Added standing lumbar extensions and straight leg raises with transverse abdominal activation today for lumbopelvic stability and motor control, which she tolerated well. Pt was able to start neuro reeducation training, strengthening, and endurance training on the lumbar MedX at 50% of max peak torque according to the initial visit isometric test. Lumbar MedX was set at 44 ft/lbs and she was able to complete 16 reps, with 7/10 RPE. Pt was also able to complete all of the peripheral strengthening exercises without increased discomfort.  and will complete the complete circuit next visit as tolerated.  Will progress as tolerated.     Patient is making good progress towards established goals.  Pt will continue to benefit from skilled outpatient physical therapy to address the deficits stated in the impairment chart, provide pt/family education and to maximize pt's level of independence in the home and community environment.     Anticipated Barriers for therapy: chronicity of impairments   Pt's spiritual, cultural and educational needs considered and pt agreeable to plan of care and goals as stated below:         Goals:   Short term goals:  6 weeks or 10 visits   - Pt will demonstrate increased lumbar MedX ROM by at least 3 degrees from the initial ROM value with improvements noted in functional ROM and ability to perform ADLs. Appropriate and Ongoing  - Pt will demonstrate increased MedX average isometric strength value by 25% from initial test resulting in improved ability to perform bending, lifting, and carrying activities safely, confidently. Appropriate and Ongoing  - Pt will  report a reduction in worst pain score by 1-2 points for improved tolerance for work duties. Appropriate and Ongoing  - Pt able to perform HEP correctly with minimal cueing or supervision from therapist to encourage independent management of symptoms. Appropriate and Ongoing     Long term goals: 10 weeks or 20 visits   - Pt will demonstrate increased lumbar MedX ROM by at least 9 degrees from initial ROM value, resulting in improved ability to perform functional forward bending while standing and sitting. Appropriate and Ongoing  - Pt will demonstrate increased MedX average isometric strength value by 40% from initial test resulting in improved ability to perform bending, lifting, and carrying activities safely and confidently. Appropriate and Ongoing  - Pt to demonstrate ability to independently control and reduce their pain through posture positioning and mechanical movements throughout a typical day. Appropriate and Ongoing  - Pt will demonstrate reduced pain and improved functional outcomes as reported on the FOTO by reaching an intake score of >/= 51% functional ability in order to demonstrate subjective improvement in patient's condition. . Appropriate and Ongoing  - Pt will demonstrate independence with the HEP at discharge. Appropriate and Ongoing  - Pt will tolerate recreational walking for at least 30 minutes without increase in low back pain.  Appropriate and Ongoing      Plan   Continue with established Plan of Care towards established PT goals.       Therapist: Cony Rendon, PT  8/21/2024

## 2024-08-22 ENCOUNTER — TELEPHONE (OUTPATIENT)
Dept: PRIMARY CARE CLINIC | Facility: CLINIC | Age: 59
End: 2024-08-22
Payer: COMMERCIAL

## 2024-08-22 DIAGNOSIS — R63.5 WEIGHT GAIN: ICD-10-CM

## 2024-08-22 DIAGNOSIS — R29.898 DECREASED STRENGTH OF TRUNK AND BACK: Primary | ICD-10-CM

## 2024-08-22 NOTE — TELEPHONE ENCOUNTER
Wegovy was denied by her insurance. She wants to know what else she can take for weight loss? Stated with everything else she is taking she doesn't want to go cold turkey

## 2024-08-22 NOTE — TELEPHONE ENCOUNTER
----- Message from Ly Arteaga sent at 8/22/2024 12:48 PM CDT -----  Type:  Patient Returning Call    Who Called: Pt  Who Left Message for Patient:  Does the patient know what this is regarding?: prescription  Would the patient rather a call back or a response via [facility]? Call  Best Call Back Number:  [phone]  Additional Information:  Pt would like to speak to provider related to prescription.  Pt states prior authorization is need for medication to be filled

## 2024-08-22 NOTE — TELEPHONE ENCOUNTER
----- Message from Stella Pressley sent at 8/22/2024  1:22 PM CDT -----  Contact: Patient @ [phone]  Type: Returning a call    Who left a message? Aby    When did the practice call? Today at 1:19, Call was lost    Does patient know what this is regarding:    Would the patient rather a call back or a response via [facility]? Call     Comments:

## 2024-08-23 ENCOUNTER — TELEPHONE (OUTPATIENT)
Dept: BARIATRICS | Facility: CLINIC | Age: 59
End: 2024-08-23
Payer: COMMERCIAL

## 2024-08-23 NOTE — TELEPHONE ENCOUNTER
I cannot prescribe any diet meds but the bariatric dept dose and I will refer to them for eval   Will repeat lipid panel. Pt not currently on a statin.

## 2024-08-27 ENCOUNTER — PATIENT MESSAGE (OUTPATIENT)
Dept: PRIMARY CARE CLINIC | Facility: CLINIC | Age: 59
End: 2024-08-27
Payer: COMMERCIAL

## 2024-08-27 DIAGNOSIS — I10 ESSENTIAL HYPERTENSION: ICD-10-CM

## 2024-08-27 RX ORDER — PROPRANOLOL HYDROCHLORIDE 60 MG/1
60 TABLET ORAL 2 TIMES DAILY
Qty: 180 TABLET | Refills: 3 | Status: SHIPPED | OUTPATIENT
Start: 2024-08-27

## 2024-08-27 NOTE — TELEPHONE ENCOUNTER
Refill Routing Note   Medication(s) are not appropriate for processing by [facility] for the following reason(s):        Non-participating provider    [facility] action(s):  Route               Appointments  past 12m or future 3m with PCP    Date Provider   Last Visit   8/20/2024 Kelley Badillo NP   Next Visit   Visit date not found Kelley Badillo NP   ED visits in past 90 days: 1        Note composed:12:07 PM 08/27/2024

## 2024-08-28 ENCOUNTER — TELEPHONE (OUTPATIENT)
Dept: BARIATRICS | Facility: CLINIC | Age: 59
End: 2024-08-28
Payer: COMMERCIAL

## 2024-08-28 ENCOUNTER — CLINICAL SUPPORT (OUTPATIENT)
Dept: REHABILITATION | Facility: HOSPITAL | Age: 59
End: 2024-08-28
Payer: OTHER GOVERNMENT

## 2024-08-28 DIAGNOSIS — M53.86 DECREASED RANGE OF MOTION OF LUMBAR SPINE: ICD-10-CM

## 2024-08-28 DIAGNOSIS — R29.898 DECREASED STRENGTH OF TRUNK AND BACK: Primary | ICD-10-CM

## 2024-08-28 PROCEDURE — 97110 THERAPEUTIC EXERCISES: CPT

## 2024-08-28 PROCEDURE — 97112 NEUROMUSCULAR REEDUCATION: CPT

## 2024-08-28 NOTE — PROGRESS NOTES
"  [facility]      Name: Mary Alice Garcia  Clinic Number: [id number]    Therapy Diagnosis:   Encounter Diagnoses   Name Primary?    Decreased strength of trunk and back Yes    Decreased range of motion of lumbar spine        Physician: Garrick Hernandez PA-C    Visit Date: 8/28/2024    Physician Orders: PT Eval and Treat  Medical Diagnosis from Referral: M54.42,G89.29 (ICD-10-CM) - Chronic left-sided low back pain with left-sided sciatica Y99.0 (ICD-10-CM) - Work related injury  Evaluation Date: 7/31/2024  Authorization Period Expiration: 9/13/2024  Plan of Care Expiration: 10/9/2024  Reassessment Due: 8/31/2024  Visit # / Visits authorized: 3/20 (2/12 authorized by )  MedX testing visit 2    Time In:7  Time Out: 8  Total Billable Time: 55 minutes   INSURANCE and OUTCOMES: Fee for Service with FOTO Outcomes 1/3    Precautions: Standard    Pattern of pain determined: 1 PEN, pattern 5    Subjective   Mary Alice reports feeling okay today, moderate pain level. Pt reports her R upper back is also hurting today, which she attributes to sleeping wrong.    Patient reports tolerating previous visit no adverse effects  Patient reports their pain to be 6/10 on a 0-10 scale with 0 being no pain and 10 being the worst pain imaginable.  Pain Location: left low back and leg     Work and leisure: [employer], currently light duty / none  Pt goals: "pain free"    Objective     Baseline IM Testing Results:      Initial: Midpoint: Final:   ROM 0-42 deg     Max Peak Torque 88      Min Peak Torque 47      Flex/Ext Ratio 1.9:1     % below normative data -48%     % gain from initial N/a       Outcomes:  Initial score: 47% functional ability   Visit 5 score:  Goal: 51% functional ability       Treatment    Mary Alice received the treatments listed below:      received neuromuscular education  to isolate and engage spinal stabilization musculature correctly for motor control and coordination to aid in function and posture " for 10 minutes on the Medical Medx Machine.  Patient performed MedX dynamic exercise with emphasis on spinal muscular control using pacer throughout  active range of motion. Therapist assisted patient in achieving optimal exertion for neural reeducation and endurance training by using the  [place] Exertion Rating scale, by instructing the patient to aim for mid range of exertion, performing 15-20 repetitions, slowly, correctly,and safely.          8/28/2024     7:53 AM   [facility]   Visit Number 4   VAS Pain Rating 6   Treadmill Time (in min.) 5 min   Lumbar Stretches - Slouch Overcorrection 10   Extension in Standing 10   Flexion in Lying 10   Lumbar Weight 44 lbs   Repetitions 18   Rating of Perceived Exertion 7   Ice - Z Lie (in min.) 5           therapeutic exercises to develop strength, endurance, ROM, flexibility, posture, and core stabilization for 50 minutes including:    LTR x 10  DKTC with SB x 10  PPT cueing to avoid breath hold x10  Bridges with RTB 2x10  Clamshell R TB x10  Open books x 10  SOC x10  BKFO RTB x10  Peripheral muscle strengthening which included 1 set of 15-20 repetitions at a slow, controlled 10-13 second per rep pace focused on strengthening supporting musculature for improved body mechanics and functional mobility.  Pt and therapist focused on proper form during treatment to ensure optimal strengthening of each targeted muscle group.  Machines were utilized including torso rotation, leg press, hip abd and hip add, leg ext.  Leg curl, triceps, biceps, chest and row added visit 3      cold pack for 5 minutes to low back in z-lie.    Home Exercises Provided and Patient Education Provided   Home exercises include:LTR, DKTC, open book  Cardio program:visit 5  Lifting education date:visit 11  Posture/Lumbar roll: recommended  Fridge Magnet Discharge handout (date given):  Equipment at home/gym membership: treadmill/no      Education provided:   - HEP  - POC  - Purpose and procedure for  MedX isometric testing    Written Home Exercises Provided: Patient instructed to cont prior HEP.  Exercises were reviewed and Mary Alice was able to demonstrate them prior to the end of the session.  Mary Alice demonstrated good  understanding of the education provided.     See EMR under Patient Instructions for exercises provided prior visit.          Assessment     Pt presents to visit reporting moderate levels of pain today.  Added BKFO's with RTB.  Pt was able to continue neuro reeducation training, strengthening, and endurance training on the lumbar MedX. Lumbar MedX was maintained at  44 ft/lbs and she was able to complete 18reps, with 7/10 RPE. Pt was also able to complete all of the peripheral strengthening exercises without increased discomfort.  and will complete the complete circuit next visit as tolerated.  Will progress as tolerated.     Patient is making good progress towards established goals.  Pt will continue to benefit from skilled outpatient physical therapy to address the deficits stated in the impairment chart, provide pt/family education and to maximize pt's level of independence in the home and community environment.     Anticipated Barriers for therapy: chronicity of impairments   Pt's spiritual, cultural and educational needs considered and pt agreeable to plan of care and goals as stated below:         Goals:   Short term goals:  6 weeks or 10 visits   - Pt will demonstrate increased lumbar MedX ROM by at least 3 degrees from the initial ROM value with improvements noted in functional ROM and ability to perform ADLs. Appropriate and Ongoing  - Pt will demonstrate increased MedX average isometric strength value by 25% from initial test resulting in improved ability to perform bending, lifting, and carrying activities safely, confidently. Appropriate and Ongoing  - Pt will report a reduction in worst pain score by 1-2 points for improved tolerance for work duties. Appropriate and Ongoing  - Pt able to  perform HEP correctly with minimal cueing or supervision from therapist to encourage independent management of symptoms. Appropriate and Ongoing     Long term goals: 10 weeks or 20 visits   - Pt will demonstrate increased lumbar MedX ROM by at least 9 degrees from initial ROM value, resulting in improved ability to perform functional forward bending while standing and sitting. Appropriate and Ongoing  - Pt will demonstrate increased MedX average isometric strength value by 40% from initial test resulting in improved ability to perform bending, lifting, and carrying activities safely and confidently. Appropriate and Ongoing  - Pt to demonstrate ability to independently control and reduce their pain through posture positioning and mechanical movements throughout a typical day. Appropriate and Ongoing  - Pt will demonstrate reduced pain and improved functional outcomes as reported on the FOTO by reaching an intake score of >/= 51% functional ability in order to demonstrate subjective improvement in patient's condition. . Appropriate and Ongoing  - Pt will demonstrate independence with the HEP at discharge. Appropriate and Ongoing  - Pt will tolerate recreational walking for at least 30 minutes without increase in low back pain.  Appropriate and Ongoing      Plan   Continue with established Plan of Care towards established PT goals.       Therapist: Qing Mcdonald, PT  8/28/2024

## 2024-08-30 ENCOUNTER — TELEPHONE (OUTPATIENT)
Dept: URGENT CARE | Facility: CLINIC | Age: 59
End: 2024-08-30
Payer: COMMERCIAL

## 2024-08-30 NOTE — TELEPHONE ENCOUNTER
Arley Ferrer RN Benoit, Michael E., PA-C  Caller: Unspecified (Today,  2:37 PM)  The patient would like to speak to you regarding her injury since she started the healthy back program she has been having increased pain on he left side and she is unable to take the medication while at work her number is [phone]        I RETURNED PATIENT'S CALL.  LEFT VOICEMAIL FOR HER TO CALL ME BACK.

## 2024-09-03 ENCOUNTER — TELEPHONE (OUTPATIENT)
Dept: BARIATRICS | Facility: CLINIC | Age: 59
End: 2024-09-03
Payer: COMMERCIAL

## 2024-09-05 ENCOUNTER — TELEPHONE (OUTPATIENT)
Dept: BARIATRICS | Facility: CLINIC | Age: 59
End: 2024-09-05
Payer: COMMERCIAL

## 2024-09-06 ENCOUNTER — TELEPHONE (OUTPATIENT)
Dept: BARIATRICS | Facility: CLINIC | Age: 59
End: 2024-09-06
Payer: COMMERCIAL

## 2024-09-08 ENCOUNTER — PATIENT MESSAGE (OUTPATIENT)
Dept: PRIMARY CARE CLINIC | Facility: CLINIC | Age: 59
End: 2024-09-08
Payer: COMMERCIAL

## 2024-09-10 ENCOUNTER — OFFICE VISIT (OUTPATIENT)
Dept: URGENT CARE | Facility: CLINIC | Age: 59
End: 2024-09-10
Payer: OTHER GOVERNMENT

## 2024-09-10 VITALS
TEMPERATURE: 98 F | BODY MASS INDEX: 28 KG/M2 | DIASTOLIC BLOOD PRESSURE: 67 MMHG | WEIGHT: 158 LBS | SYSTOLIC BLOOD PRESSURE: 96 MMHG | OXYGEN SATURATION: 97 % | RESPIRATION RATE: 17 BRPM | HEART RATE: 67 BPM | HEIGHT: 63 IN

## 2024-09-10 DIAGNOSIS — Z02.6 ENCOUNTER RELATED TO WORKER'S COMPENSATION CLAIM: ICD-10-CM

## 2024-09-10 DIAGNOSIS — G89.29 CHRONIC LEFT-SIDED LOW BACK PAIN WITH LEFT-SIDED SCIATICA: Primary | ICD-10-CM
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
## 2024-09-10 DIAGNOSIS — M54.42 CHRONIC LEFT-SIDED LOW BACK PAIN WITH LEFT-SIDED SCIATICA: Primary | ICD-10-CM

## 2024-09-10 DIAGNOSIS — S39.012A ACUTE MYOFASCIAL STRAIN OF LUMBAR REGION, INITIAL ENCOUNTER: ICD-10-CM

## 2024-09-10 PROCEDURE — 99213 OFFICE O/P EST LOW 20 MIN: CPT | Mod: S$GLB,,, | Performed by: PHYSICIAN ASSISTANT

## 2024-09-10 RX ORDER — ACETAMINOPHEN 500 MG
1000 TABLET ORAL EVERY 6 HOURS PRN
Qty: 60 TABLET | Refills: 1 | Status: SHIPPED | OUTPATIENT
Start: 2024-09-10 | End: 2025-09-10

## 2024-09-10 RX ORDER — NAPROXEN 500 MG/1
500 TABLET ORAL DAILY PRN
Qty: 30 TABLET | Refills: 1 | Status: SHIPPED | OUTPATIENT
Start: 2024-09-10

## 2024-09-10 NOTE — PROGRESS NOTES
Subjective:      Patient ID: Mary Alice Garcia is a 59 y.o. female.    Chief Complaint: Back Pain (DOI 08/19/2024 Back Pain Tucker)    Patient's place of employment - [employer]  Patient's job title -   Date of Injury - 8/19/2023  Body part injured - LOWER BACK   Current work status per last visit -  light duty   Improved, same, or worse -  worse  Pain Scale right now (1-10) -  8  tucker      Provider note:   Patient presents for follow-up early due to increased pain since starting the healthy back program.  Patient states that once she is completed with her PT session pain increases and is wondering what she can take to help with her pain.  Patient states she does not want to take gabapentin during the day as it makes her drowsy.  So far she has not taken anything for pain during the day.  She also mentions that the healthy back program is much more in-depth and in tune with her back problem.  Says the exercises hurt, however thinks she will benefit greatly from the program. MEB    Back Pain  Pertinent negatives include no numbness.     Constitution: Positive for activity change.   Musculoskeletal:  Positive for pain and back pain.   Neurological:  Negative for numbness and tingling.     Objective:     Physical Exam  Vitals and nursing note reviewed.   Constitutional:       General: She is not in acute distress.     Appearance: Normal appearance. She is well-developed.   HENT:      Head: Normocephalic and atraumatic.      Right Ear: Hearing and external ear normal.      Left Ear: Hearing and external ear normal.      Nose: Nose normal. No nasal deformity.   Eyes:      General: Lids are normal.      Conjunctiva/sclera: Conjunctivae normal.      Right eye: Right conjunctiva is not injected.      Left eye: Left conjunctiva is not injected.   Neck:      Trachea: Trachea normal.   Pulmonary:      Effort: Pulmonary effort is normal. No respiratory distress.      Breath sounds: No stridor.   Musculoskeletal:      Cervical back: Normal  and normal range of motion. No spinous process tenderness or muscular tenderness.      Thoracic back: Normal.      Lumbar back: Tenderness present. No deformity. Decreased range of motion (Flexion to 60° at the waist). Positive left straight leg raise test. Negative right straight leg raise test.        Back:    Skin:     General: Skin is warm and dry.      Findings: No abrasion or bruising.   Neurological:      General: No focal deficit present.      Mental Status: She is alert.   Psychiatric:         Attention and Perception: She is attentive.         Speech: Speech normal.         Behavior: Behavior normal.         Thought Content: Thought content normal.        Assessment:      1. Chronic left-sided low back pain with left-sided sciatica    2. Encounter related to worker's compensation claim    3. Acute myofascial strain of lumbar region, initial encounter    4. Work related injury      Plan:       Instructed to take 1 naproxen and 2 extra-strength Tylenol prior to her PT visits.  Continue physical therapy, home exercises.  She still has not been approved to see pain management.  I will inquire to find out what we need to do to get her approved to see pain management.  This has been going on for over a year.  Patient to return to clinic in 6 weeks or sooner as needed.    Medications Ordered This Encounter   Medications    acetaminophen (TYLENOL EXTRA STRENGTH) 500 MG tablet     Sig: Take 2 tablets (1,000 mg total) by mouth every 6 (six) hours as needed for Pain.     Dispense:  60 tablet     Refill:  1    naproxen (NAPROSYN) 500 MG tablet     Sig: Take 1 tablet (500 mg total) by mouth daily as needed (pain).     Dispense:  30 tablet     Refill:  1     Patient Instructions: Attention not to aggravate affected area, Daily home exercises/warm soaks, Continue Physical Therapy, Referral to specialist to be scheduled, once authorized   Restrictions:  (See CA-17)  Follow up in about 6 weeks (around 10/22/2024) for  Reassessment.

## 2024-09-10 NOTE — LETTER
[facility] [address] [address]  Phone: [phone]  Fax: [phone]  [facility]: [phone]    Pt Name: Mary Alice Garcia  Injury Date: 08/19/2023   Employee ID:  Date of Treatment: 09/10/2024   Company: [employer]      Appointment Time: 10:45 AM Arrived: 11;26 am   Provider: Garrick Hernandez PA-C Time Out:1:00 pm     Office Treatment:   1. Chronic left-sided low back pain with left-sided sciatica    2. Encounter related to worker's compensation claim    3. Acute myofascial strain of lumbar region, initial encounter    4. Work related injury      Medications Ordered This Encounter   Medications    acetaminophen (TYLENOL EXTRA STRENGTH) 500 MG tablet    naproxen (NAPROSYN) 500 MG tablet      Patient Instructions: Attention not to aggravate affected area, Daily home exercises/warm soaks, Continue Physical Therapy, Referral to specialist to be scheduled, once authorized    Restrictions:  (See CA-17)     Return Appointment: 10/22/2024 at 11:00 am   [place]

## 2024-09-13 ENCOUNTER — TELEPHONE (OUTPATIENT)
Dept: PAIN MEDICINE | Facility: CLINIC | Age: 59
End: 2024-09-13
Payer: COMMERCIAL

## 2024-09-13 ENCOUNTER — TELEPHONE (OUTPATIENT)
Dept: URGENT CARE | Facility: CLINIC | Age: 59
End: 2024-09-13
Payer: COMMERCIAL

## 2024-09-13 NOTE — TELEPHONE ENCOUNTER
----- Message from Skylar oMnson sent at 9/13/2024  1:22 PM CDT -----  Type:  Return Call    Who Called: SHOLA NICE [[id number]]    Who Left Message for Patient: Hardik     Does the patient know what this is regarding?: YES     Would the patient rather a call back or a response via [facility]? CALL back     Best Call Back Number: [phone]    Additional Information:

## 2024-09-13 NOTE — TELEPHONE ENCOUNTER
PT phone went straight to   , No ANSWER .   Reached out to her son, on the PT contact list., he answered and told me he would reach out to the PT his mother. Told PT  if he could get her to contact our office to get her scheduled for an appt, since her referral was approved.

## 2024-09-13 NOTE — TELEPHONE ENCOUNTER
Patient called and states that she would like to leave a message for Garrick Hernandez. She states that you were suppose to call her in two prescriptions and she would like to know what happen? She states that she's been going since Tuesday to [facility]'s looking for them and they are still saying that they don't have anything from you for her. I informed her that I would send him a message letting him know about this information you provided to me.

## 2024-09-16 ENCOUNTER — DOCUMENTATION ONLY (OUTPATIENT)
Dept: REHABILITATION | Facility: HOSPITAL | Age: 59
End: 2024-09-16
Payer: COMMERCIAL

## 2024-09-16 ENCOUNTER — TELEPHONE (OUTPATIENT)
Dept: PAIN MEDICINE | Facility: CLINIC | Age: 59
End: 2024-09-16
Payer: COMMERCIAL

## 2024-09-16 NOTE — TELEPHONE ENCOUNTER
----- Message from Chrissy Ray MA sent at 9/16/2024  1:15 PM CDT -----  Regarding: Return Call  Message Type: Return Call           Who Called:SHOLA NICE [[id number]]              Who Left Message for Patient:Hardik Sanders MA                Does the patient know what this is regarding?  Yes, pt wants to know if her workman's comp was approved              Best Call Back Number:[phone]               Additional Information: Patient is returning a call.  Please assist.

## 2024-09-16 NOTE — PROGRESS NOTES
Pt did not show for scheduled appointment today.  Pt evaluated on 7/11/24 and has only attended 6 PT sessions.  Called pt and informed pt to contact workmen's comp as she is not to be scheduled for any further appointments due to poor attendance.  D/C

## 2024-10-03 ENCOUNTER — TELEPHONE (OUTPATIENT)
Dept: PAIN MEDICINE | Facility: CLINIC | Age: 59
End: 2024-10-03
Payer: COMMERCIAL

## 2024-10-07 ENCOUNTER — TELEPHONE (OUTPATIENT)
Dept: BARIATRICS | Facility: CLINIC | Age: 59
End: 2024-10-07
Payer: COMMERCIAL

## 2024-10-14 ENCOUNTER — TELEPHONE (OUTPATIENT)
Dept: PAIN MEDICINE | Facility: CLINIC | Age: 59
End: 2024-10-14
Payer: COMMERCIAL

## 2024-10-22 ENCOUNTER — PATIENT MESSAGE (OUTPATIENT)
Dept: PRIMARY CARE CLINIC | Facility: CLINIC | Age: 59
End: 2024-10-22
Payer: COMMERCIAL

## 2024-10-23 NOTE — TELEPHONE ENCOUNTER
Pt states PCP told her to contact her if Zoloft dose was not strong enough and you would go up. Pt currently on 50mg. States she is steal dealing with anxiety. Please let me know if you prefer an appt to discuss.      LOV with Kelley Badillo NP , 8/20/2024

## 2024-10-24 DIAGNOSIS — F41.9 ANXIETY: ICD-10-CM

## 2024-10-24 DIAGNOSIS — F43.9 STRESS: Primary | ICD-10-CM

## 2024-10-24 RX ORDER — SERTRALINE HYDROCHLORIDE 100 MG/1
100 TABLET, FILM COATED ORAL DAILY
Qty: 90 TABLET | Refills: 1 | Status: SHIPPED | OUTPATIENT
Start: 2024-10-24

## 2024-10-31 ENCOUNTER — TELEPHONE (OUTPATIENT)
Dept: PAIN MEDICINE | Facility: CLINIC | Age: 59
End: 2024-10-31
Payer: COMMERCIAL

## 2024-10-31 ENCOUNTER — OFFICE VISIT (OUTPATIENT)
Dept: PAIN MEDICINE | Facility: CLINIC | Age: 59
End: 2024-10-31
Payer: OTHER GOVERNMENT

## 2024-10-31 ENCOUNTER — OFFICE VISIT (OUTPATIENT)
Dept: URGENT CARE | Facility: CLINIC | Age: 59
End: 2024-10-31
Payer: OTHER GOVERNMENT

## 2024-10-31 VITALS
OXYGEN SATURATION: 98 % | HEIGHT: 63 IN | SYSTOLIC BLOOD PRESSURE: 118 MMHG | HEART RATE: 65 BPM | TEMPERATURE: 98 F | WEIGHT: 156.5 LBS | BODY MASS INDEX: 27.73 KG/M2 | RESPIRATION RATE: 20 BRPM | DIASTOLIC BLOOD PRESSURE: 82 MMHG

## 2024-10-31 VITALS
HEART RATE: 68 BPM | HEIGHT: 63 IN | SYSTOLIC BLOOD PRESSURE: 125 MMHG | DIASTOLIC BLOOD PRESSURE: 86 MMHG | BODY MASS INDEX: 27.73 KG/M2 | WEIGHT: 156.5 LBS

## 2024-10-31 DIAGNOSIS — M51.362 DEGENERATION OF INTERVERTEBRAL DISC OF LUMBAR REGION WITH DISCOGENIC BACK PAIN AND LOWER EXTREMITY PAIN: ICD-10-CM

## 2024-10-31 DIAGNOSIS — M46.1 SACROILIITIS: Primary | ICD-10-CM

## 2024-10-31 DIAGNOSIS — Z02.6 ENCOUNTER RELATED TO WORKER'S COMPENSATION CLAIM: ICD-10-CM

## 2024-10-31 DIAGNOSIS — M47.816 LUMBAR SPONDYLOSIS: ICD-10-CM

## 2024-10-31 DIAGNOSIS — M54.42 CHRONIC LEFT-SIDED LOW BACK PAIN WITH LEFT-SIDED SCIATICA: Primary | ICD-10-CM
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
## 2024-10-31 DIAGNOSIS — G89.29 CHRONIC LEFT-SIDED LOW BACK PAIN WITH LEFT-SIDED SCIATICA: Primary | ICD-10-CM

## 2024-10-31 PROCEDURE — 99999 PR PBB SHADOW E&M-EST. PATIENT-LVL III: CPT | Mod: PBBFAC,,, | Performed by: STUDENT IN AN ORGANIZED HEALTH CARE EDUCATION/TRAINING PROGRAM

## 2024-10-31 PROCEDURE — 99204 OFFICE O/P NEW MOD 45 MIN: CPT | Mod: S$GLB,,, | Performed by: STUDENT IN AN ORGANIZED HEALTH CARE EDUCATION/TRAINING PROGRAM

## 2024-11-12 ENCOUNTER — TELEPHONE (OUTPATIENT)
Dept: PAIN MEDICINE | Facility: CLINIC | Age: 59
End: 2024-11-12
Payer: COMMERCIAL

## 2024-11-12 NOTE — TELEPHONE ENCOUNTER
----- Message from Yaneth sent at 11/12/2024  9:32 AM CST -----  Regarding: prior authization  Contact: [phone]  Pt called regarding prior authorization by the dept of labor because this is a workmen's comp case and she needs to make she it is approved before moving forward with the shot that Dr Mott wants to give her. Please call patient  back at [phone] procedure is scheduled for 11/19   TOVA DOBBS is a 69year old Male with history of HLD, HLD , DM2 who presents with worsening confusion and ascites w/ concern for acute decompensated cirrhosis.     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 Prior imaging w/ Hepatic steatosis. Suspect likely worsening MetALD cirrhosis given alcoholism initially thought to have acute decompensation however w/o ascites on imaging and no evidence of HE. CT A/P w/ hepatic steatosis w/ numerous subcentimeter nodules suggestive of regenerative nodules.     MELD: 21  Child-Islas: C 11  HE: no evidence of HE. A&Ox3, completed serial 7s without difficulty.   Varices: No prior EGD. No hx of varices  Ascites: no evidence of ascites on imaging   HCC: will need MRI abdomen to better characterize numerous subcentimeter nodules (likely as outpatient)    #Abdominal Pain  #Nausea/Vomiting w/ Poor PO intake.   May be related to infection vs gastritis vs PUD vs esophagitis vs GOO.  Unlikely to be 2/2 HE given absence of signs of HE at this time. Unclear at this time as patient has not wanted to attempt PO intake in the hospital. CT A/P w/o evidence of structural abnromalities or gastric wall thickening. No hx of dysphagia or odynophagia. Would recommend PO challenge as tolerated, if unable to tolerate regular diet, can attempt liquid diet. If no significant improvement throughout weekend, may have to consider endoscopic evaluation.     Recommendations:  - Follow up: EtoH level, Ammonia, PETH level [and preferably ethyl alcohol quantitative urine testing given faster turn around time] and serum/urine drug screening; check HAV IgM, HBsAg, HBsAb, HBcAb IgM, HCV Ab [with reflex HCV PCR if positive], HBV PCR, HEV IgM/IgG and assess for autoimmune etiology with SYEDA [antinuclear antibody], antimitochondrial antibody [AMA], ASMA [anti smooth muscle antibody], anti-LKM [liver kidney microsomal antibody], anti-sLA [soluble liver antigen antibody], and quantitative immunoglobulins [IgG, IgA, IgM], ceruloplasmin  - empiric high dose thiamine, folate, MVI  - Would recommend PO challenge as tolerated, if unable to tolerate regular diet, can attempt liquid diet.   - Some stool burden on CT A/P. Recommend trial Miralax QD  -trend clinical symptoms, exam findings, vital signs, CBC, CMP, INR   TOVA DOBBS is a 69year old Male with history of HLD, HLD , DM2 who presents with worsening confusion and ascites w/ concern for acute decompensated cirrhosis.     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 Prior imaging w/ Hepatic steatosis. Suspect likely worsening MetALD cirrhosis given alcoholism initially thought to have acute decompensation however w/o ascites on imaging and no evidence of HE. CT A/P w/ hepatic steatosis w/ numerous subcentimeter nodules suggestive of regenerative nodules.     MELD: 21  Child-Islas: C 11  HE: no evidence of HE. A&Ox3, completed serial 7s without difficulty.   Varices: No prior EGD. No hx of varices  Ascites: no evidence of ascites on imaging   HCC: will need MRI abdomen to better characterize numerous subcentimeter nodules (likely as outpatient)    #Abdominal Pain  #Nausea/Vomiting w/ Poor PO intake.   May be related to infection vs gastroparesis vs PUD vs GOO.  Unlikely to be 2/2 HE given absence of signs of HE at this time. Unclear at this time as patient has not wanted to attempt PO intake in the hospital. CT A/P w/o evidence of structural abnromalities or gastric wall thickening. No hx of dysphagia or odynophagia. Would recommend PO challenge as tolerated, if unable to tolerate regular diet, can attempt liquid diet. If no significant improvement throughout weekend, may have to consider endoscopic evaluation.     Recommendations:  - Follow up: EtoH level, Ammonia, PETH level [and preferably ethyl alcohol quantitative urine testing given faster turn around time] and serum/urine drug screening; check HAV IgM, HBsAg, HBsAb, HBcAb IgM, HCV Ab [with reflex HCV PCR if positive], HBV PCR, HEV IgM/IgG and assess for autoimmune etiology with SYEDA [antinuclear antibody], antimitochondrial antibody [AMA], ASMA [anti smooth muscle antibody], anti-LKM [liver kidney microsomal antibody], anti-sLA [soluble liver antigen antibody], and quantitative immunoglobulins [IgG, IgA, IgM], ceruloplasmin  - empiric high dose thiamine, folate, MVI  - Would recommend PO challenge as tolerated, if unable to tolerate regular diet, can attempt liquid diet.   - Some stool burden on CT A/P. Recommend trial Miralax QD  -trend clinical symptoms, exam findings, vital signs, CBC, CMP, INR   TOVA DOBBS is a 69year old Male with history of HLD, HLD , DM2 who presents with worsening confusion and ascites w/ concern for acute decompensated cirrhosis.     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 Prior imaging w/ Hepatic steatosis. Suspect likely worsening MetALD cirrhosis given alcoholism initially thought to have acute decompensation however w/o ascites on imaging and no evidence of HE. CT A/P w/ hepatic steatosis w/ numerous subcentimeter nodules suggestive of regenerative nodules.     MELD: 21  Child-Islas: C 11  HE: no evidence of HE. A&Ox3, completed serial 7s without difficulty.   Varices: No prior EGD. No hx of varices  Ascites: no evidence of ascites on imaging   HCC: will need MRI abdomen to better characterize numerous subcentimeter nodules (likely as outpatient)    #Abdominal Pain  #Nausea/Vomiting w/ Poor PO intake.   May be related to infection vs gastroparesis vs PUD vs GOO.  Unlikely to be 2/2 HE given absence of signs of HE at this time. Unclear at this time as patient has not wanted to attempt PO intake in the hospital. CT A/P w/o evidence of structural abnromalities or gastric wall thickening. No hx of dysphagia or odynophagia. Would recommend PO challenge as tolerated, if unable to tolerate regular diet, can attempt liquid diet. If no significant improvement throughout weekend, may have to consider endoscopic evaluation.     Recommendations:  - Follow up: EtoH level, Ammonia, PETH level [and preferably ethyl alcohol quantitative urine testing given faster turn around time] and serum/urine drug screening; check HAV IgM, HBsAg, HBsAb, HBcAb IgM, HCV Ab [with reflex HCV PCR if positive], HBV PCR, HEV IgM/IgG and assess for autoimmune etiology with SYEDA [antinuclear antibody], antimitochondrial antibody [AMA], ASMA [anti smooth muscle antibody], anti-LKM [liver kidney microsomal antibody], anti-sLA [soluble liver antigen antibody], and quantitative immunoglobulins [IgG, IgA, IgM], ceruloplasmin  - empiric high dose thiamine, folate, MVI  - Would recommend PO challenge as tolerated  - Please start standing Zofran 4 mg Q8hrs IV if Qtc ok   - If unable to tolerate PO intake despite Zofran w/ nausea/vomiting, please contact on-call GI fellow to schedule patient for EGD on Monday   - Some stool burden on CT A/P. Recommend trial Miralax QD  -trend clinical symptoms, exam findings, vital signs, CBC, CMP, INR    Discussed w/ Dr. Broussard     Note incomplete until finalized by attending signature/attestation.    Brad Dupont  GI/Hepatology Fellow, PGY-4    MONDAY-FRIDAY 8AM-5PM:  Please message via [employer] or email [email or web address]     On Weekends/Holidays (All day) and Weekdays after 5 PM to 8 AM  For nonurgent consults please email:  Please email [email or web address]  For urgent consults:  Please contact on call GI team. See Amion schedule ([facility]), [employer] paging system ([facility]), or call hospital  ([facility])       TOVA DOBBS is a 69year old Male with history of HLD, HLD , DM2 who presents with worsening confusion and ascites w/ concern for acute decompensated cirrhosis.     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 Prior imaging w/ Hepatic steatosis. Suspect likely worsening MetALD cirrhosis given alcoholism initially thought to have acute decompensation however w/o ascites on imaging and no evidence of HE. CT A/P w/ hepatic steatosis w/ numerous subcentimeter nodules suggestive of regenerative nodules.     MELD: 21  Child-Islas: C 11  HE: no evidence of HE. A&Ox3, completed serial 7s without difficulty.   Varices: No prior EGD. No hx of varices  Ascites: no evidence of ascites on imaging   HCC: will need MRI abdomen to better characterize numerous subcentimeter nodules (likely as outpatient)    #Abdominal Pain  #Nausea/Vomiting w/ Poor PO intake.   May be related to infection vs gastroparesis vs PUD vs GOO.  Unlikely to be 2/2 HE given absence of signs of HE at this time. Unclear at this time as patient has not wanted to attempt PO intake in the hospital. CT A/P w/o evidence of structural abnromalities or gastric wall thickening. No hx of dysphagia or odynophagia. Would recommend PO challenge as tolerated, if unable to tolerate regular diet, can attempt liquid diet. If no significant improvement throughout weekend, may have to consider endoscopic evaluation.     Recommendations:  - Follow up: EtoH level, Ammonia, PETH level [and preferably ethyl alcohol quantitative urine testing given faster turn around time] and serum/urine drug screening; check HAV IgM, HBsAg, HBsAb, HBcAb IgM, HCV Ab [with reflex HCV PCR if positive], HBV PCR, HEV IgM/IgG and assess for autoimmune etiology with SYEDA [antinuclear antibody], antimitochondrial antibody [AMA], ASMA [anti smooth muscle antibody], anti-LKM [liver kidney microsomal antibody], anti-sLA [soluble liver antigen antibody], and quantitative immunoglobulins [IgG, IgA, IgM], ceruloplasmin  - empiric high dose thiamine, folate, MVI  - Would recommend PO challenge as tolerated  - Please start standing Zofran 4 mg Q8hrs IV if Qtc ok   - Start 100 cc of 25% albumin Q8 hours for 3 doses   - If unable to tolerate PO intake despite Zofran w/ nausea/vomiting, please contact on-call GI fellow to schedule patient for EGD on Monday   - Some stool burden on CT A/P. Recommend trial Miralax QD  -trend clinical symptoms, exam findings, vital signs, CBC, CMP, INR    Discussed w/ Dr. Broussard     Note incomplete until finalized by attending signature/attestation.    Brad Dupont  GI/Hepatology Fellow, PGY-4    MONDAY-FRIDAY 8AM-5PM:  Please message via JustBook or email [email or web address]     On Weekends/Holidays (All day) and Weekdays after 5 PM to 8 AM  For nonurgent consults please email:  Please email [email or web address]  For urgent consults:  Please contact on call GI team. See Amion schedule ([facility]), [employer] paging system ([facility]), or call hospital  ([facility])       TOVA DOBBS is a 69year old Male with history of HLD, HLD , DM2 who presents with worsening confusion and ascites w/ concern for acute decompensated cirrhosis.     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 Prior imaging w/ Hepatic steatosis. Suspect likely worsening MetALD cirrhosis given alcoholism initially thought to have acute decompensation however w/o ascites on imaging and no evidence of HE. CT A/P w/ hepatic steatosis w/ numerous subcentimeter nodules suggestive of regenerative nodules.     MELD: 21  Child-Islas: C 11  HE: no evidence of HE. A&Ox3, completed serial 7s without difficulty.   Varices: No prior EGD. No hx of varices  Ascites: no evidence of ascites on imaging   HCC: will need MRI abdomen to better characterize numerous subcentimeter nodules (likely as outpatient)    #Abdominal Pain  #Nausea/Vomiting w/ Poor PO intake.   May be related to infection vs gastroparesis vs PUD vs GOO.  Unlikely to be 2/2 HE given absence of signs of HE at this time. Unclear at this time as patient has not wanted to attempt PO intake in the hospital. CT A/P w/o evidence of structural abnromalities or gastric wall thickening. No hx of dysphagia or odynophagia. Would recommend PO challenge as tolerated, if unable to tolerate regular diet, can attempt liquid diet. If no significant improvement throughout weekend, may have to consider endoscopic evaluation.     Recommendations:  - Follow up: EtoH level, Ammonia, PETH level [and preferably ethyl alcohol quantitative urine testing given faster turn around time] and serum/urine drug screening; check HAV IgM, HBsAg, HBsAb, HBcAb IgM, HCV Ab [with reflex HCV PCR if positive], HBV PCR, HEV IgM/IgG and assess for autoimmune etiology with SYEDA [antinuclear antibody], antimitochondrial antibody [AMA], ASMA [anti smooth muscle antibody], anti-LKM [liver kidney microsomal antibody], anti-sLA [soluble liver antigen antibody], and quantitative immunoglobulins [IgG, IgA, IgM], ceruloplasmin  - empiric high dose thiamine, folate, MVI  - Would recommend PO challenge as tolerated  - Please start standing Zofran 4 mg Q8hrs IV if Qtc ok   - Start 100 cc of 25% albumin Q8 hours for 3 doses   - If unable to tolerate PO intake despite Zofran w/ nausea/vomiting, please contact on-call GI fellow to schedule patient for EGD on Monday   - Some stool burden on CT A/P. Recommend trial Miralax QD  - Will check AFP  -trend clinical symptoms, exam findings, vital signs, CBC, CMP, INR    Discussed w/ Dr. Broussard     Note incomplete until finalized by attending signature/attestation.    Brad Dupont  GI/Hepatology Fellow, PGY-4    MONDAY-FRIDAY 8AM-5PM:  Please message via MySiteApp or email [email or web address]     On Weekends/Holidays (All day) and Weekdays after 5 PM to 8 AM  For nonurgent consults please email:  Please email [email or web address]  For urgent consults:  Please contact on call GI team. See Amion schedule ([facility]), [employer] [place] paging system ([facility]), or call hospital  ([facility])

## 2024-11-14 ENCOUNTER — TELEPHONE (OUTPATIENT)
Dept: PAIN MEDICINE | Facility: CLINIC | Age: 59
End: 2024-11-14
Payer: COMMERCIAL

## 2024-11-14 NOTE — TELEPHONE ENCOUNTER
----- Message from Shagufta sent at 11/14/2024 12:24 PM CST -----  Regarding: Pt returned a call for Quyen regarding upcoming procedure  Contact: [phone]  Type:  Patient Returning Call        Who Called:SHOLA NICE [[id number]]        Who Left Message for Patient:Quyen        Does the patient know what this is regarding?Pt returned a call for Quyen regarding upcoming procedure. Please advise         Best Call Back Number:Telephone Information:  Mobile          [phone]            Additional Information:

## 2024-11-18 ENCOUNTER — TELEPHONE (OUTPATIENT)
Dept: PAIN MEDICINE | Facility: CLINIC | Age: 59
End: 2024-11-18
Payer: COMMERCIAL

## 2024-11-25 ENCOUNTER — TELEPHONE (OUTPATIENT)
Dept: URGENT CARE | Facility: CLINIC | Age: 59
End: 2024-11-25
Payer: COMMERCIAL

## 2024-11-25 ENCOUNTER — TELEPHONE (OUTPATIENT)
Dept: PRIMARY CARE CLINIC | Facility: CLINIC | Age: 59
End: 2024-11-25
Payer: COMMERCIAL

## 2024-11-25 NOTE — TELEPHONE ENCOUNTER
----- Message from Caridad sent at 11/25/2024  8:10 AM CST -----  Type:  Same Day Appointment Request    Caller is requesting a same day appointment.  Caller declined first available appointment listed below.    Name of Caller:pt  When is the first available appointment?n/a  Symptoms:Symptom: Abdominal Pain - Female - Not Pregnant  Outcome: Schedule an appointment to be seen within 24 hours.  Reason: Caller denied all higher acuity questions  Best Call Back Number: [phone]  Additional Information:

## 2024-12-02 ENCOUNTER — TELEPHONE (OUTPATIENT)
Dept: PAIN MEDICINE | Facility: CLINIC | Age: 59
End: 2024-12-02
Payer: COMMERCIAL

## 2024-12-02 ENCOUNTER — TELEPHONE (OUTPATIENT)
Dept: ENDOCRINOLOGY | Facility: CLINIC | Age: 59
End: 2024-12-02
Payer: COMMERCIAL

## 2024-12-02 NOTE — TELEPHONE ENCOUNTER
----- Message from AMNA Randhawa sent at 12/2/2024  4:13 PM CST -----  Regarding: FW: Reschedule  Contact: [phone]  Pt calling to reschedule appt with Shonna Santoro from 12/3.  ----- Message -----  From: Marily Gay  Sent: 12/2/2024   4:00 PM CST  To: #  Subject: Reschedule                                       Type:  Needs Medical Advice    Who Called: Mary Alice  Calling to reschedule appointment on 12/3  No appointments in Epic    Would the patient rather a call back or a response via [facility]? Call back  Best Call Back Number: [phone]    Additional Information:

## 2024-12-02 NOTE — TELEPHONE ENCOUNTER
----- Message from Kassidy Campo sent at 12/2/2024 12:21 PM CST -----  Regarding: Advise  Contact: [phone]  Type: Advice    Who Called: Patient    Would the patient rather a call back or a response via [employer]? Call Back    Best Call Back Number: [phone]    Additional Information: Patient is asking for a return call from office to confirm if procedure scheduled 12/9/24 was approved by [employer] insurance.

## 2024-12-06 ENCOUNTER — TELEPHONE (OUTPATIENT)
Dept: PAIN MEDICINE | Facility: CLINIC | Age: 59
End: 2024-12-06
Payer: COMMERCIAL

## 2024-12-16 ENCOUNTER — TELEPHONE (OUTPATIENT)
Dept: PAIN MEDICINE | Facility: CLINIC | Age: 59
End: 2024-12-16
Payer: COMMERCIAL

## 2024-12-19 ENCOUNTER — TELEPHONE (OUTPATIENT)
Dept: PAIN MEDICINE | Facility: CLINIC | Age: 59
End: 2024-12-19
Payer: COMMERCIAL

## 2024-12-19 ENCOUNTER — HOSPITAL ENCOUNTER (EMERGENCY)
Facility: HOSPITAL | Age: 59
Discharge: HOME | End: 2024-12-19
Attending: EMERGENCY MEDICINE
Payer: COMMERCIAL

## 2024-12-19 VITALS
TEMPERATURE: 98 F | SYSTOLIC BLOOD PRESSURE: 141 MMHG | RESPIRATION RATE: 16 BRPM | WEIGHT: 152 LBS | HEART RATE: 84 BPM | DIASTOLIC BLOOD PRESSURE: 86 MMHG | OXYGEN SATURATION: 100 % | HEIGHT: 63 IN | BODY MASS INDEX: 26.93 KG/M2

## 2024-12-19 DIAGNOSIS — R52 PAIN: ICD-10-CM

## 2024-12-19 DIAGNOSIS — M25.561 ACUTE PAIN OF RIGHT KNEE: Primary | ICD-10-CM

## 2024-12-19 PROCEDURE — 99284 EMERGENCY DEPT VISIT MOD MDM: CPT | Mod: 25

## 2024-12-19 PROCEDURE — 63600175 PHARM REV CODE 636 W HCPCS: Performed by: EMERGENCY MEDICINE

## 2024-12-19 PROCEDURE — 96372 THER/PROPH/DIAG INJ SC/IM: CPT | Performed by: EMERGENCY MEDICINE

## 2024-12-19 RX ORDER — DEXAMETHASONE SODIUM PHOSPHATE 4 MG/ML
8 INJECTION, SOLUTION INTRA-ARTICULAR; INTRALESIONAL; INTRAMUSCULAR; INTRAVENOUS; SOFT TISSUE
Status: COMPLETED | OUTPATIENT
Start: 2024-12-19 | End: 2024-12-19

## 2024-12-19 RX ADMIN — DEXAMETHASONE SODIUM PHOSPHATE 8 MG: 4 INJECTION, SOLUTION INTRA-ARTICULAR; INTRALESIONAL; INTRAMUSCULAR; INTRAVENOUS; SOFT TISSUE at 08:12

## 2024-12-20 NOTE — ED PROVIDER NOTES
Encounter Date: 2024       History     Chief Complaint   Patient presents with    Knee Pain     C/o R knee pain w/ swelling and difficulties bending knee x3 days. Pt reports hx of R knee problems. Pt took naproxen for pain w/o sx relief. Denies other sx or complaints.      59-year-old female presents to the emergency department complaining of right knee swelling.  Onset a few days ago.  Patient has had similar symptoms in the past.  Notes pain and swelling have gradually worsened despite over-the-counter medications.  Has significant history of arthritis.  Denies any recent injury but notes pain is now making her limp and causing pain to her back and hip as well.  Denies any focal numbness or weakness.  Denies any fever.  No other symptoms reported at this time.      Review of patient's allergies indicates:   Allergen Reactions    Paroxetine hcl Rash and Nausea And Vomiting     Past Medical History:   Diagnosis Date    Arthritis     Depression     Hyperlipidemia     Hypertension     2013    Syncope and collapse     Thyroid disease     Tuberculosis      Past Surgical History:   Procedure Laterality Date    BUNIONECTOMY Left      SECTION      COLONOSCOPY N/A 10/25/2023    Procedure: COLONOSCOPY;  Surgeon: Gadiel De MD;  Location: [facility];  Service: Endoscopy;  Laterality: N/A;    ESOPHAGOGASTRODUODENOSCOPY  2020    ESOPHAGOGASTRODUODENOSCOPY N/A 2020    Procedure: EGD (ESOPHAGOGASTRODUODENOSCOPY);  Surgeon: Rio Galeano MD;  Location: [facility];  Service: Endoscopy;  Laterality: N/A;    ESOPHAGOGASTRODUODENOSCOPY N/A 10/25/2023    Procedure: EGD (ESOPHAGOGASTRODUODENOSCOPY);  Surgeon: Gadiel De MD;  Location: [facility];  Service: Endoscopy;  Laterality: N/A;    HEMORRHOID SURGERY      KNEE SURGERY      right knee    thyroid removed      TONSILLECTOMY      TUBAL LIGATION       Family History   Problem Relation Name Age of Onset    Breast cancer Mother  Willie Mae Brown Cooks     Cancer Mother Willie Mae Brown Cooks         breast cancer    Hypertension Mother Willie Mae Brown Cooks     Hyperlipidemia Father      Breast cancer Sister      Hypertension Brother Rio Garcia     Diabetes Maternal Grandmother Ellen Lopez     Diabetes Paternal Grandmother Allie Pressley     Cancer Maternal Aunt Marleni Vega     Drug abuse Maternal Aunt Araceli Garcia      Social History     Tobacco Use    Smoking status: Never     Passive exposure: Never    Smokeless tobacco: Never   Substance Use Topics    Alcohol use: Yes     Alcohol/week: 8.0 standard drinks of alcohol     Types: 3 Glasses of wine, 5 Cans of beer per week    Drug use: No     Review of Systems   Constitutional:  Negative for chills and fever.   HENT:  Negative for congestion.    Respiratory:  Negative for cough and shortness of breath.    Cardiovascular:  Negative for chest pain.   Gastrointestinal:  Negative for abdominal pain.   Musculoskeletal:  Negative for back pain.   Neurological:  Negative for headaches.       Physical Exam     Initial Vitals [12/19/24 1841]   BP Pulse Resp Temp SpO2   133/82 68 20 98.3 °F (36.8 °C) 99 %      MAP       --         Physical Exam    Nursing note and vitals reviewed.  Constitutional: She appears well-developed and well-nourished. No distress.   HENT:   Head: Normocephalic and atraumatic.   Eyes: Conjunctivae and EOM are normal. Pupils are equal, round, and reactive to light.   Neck: Neck supple. No tracheal deviation present.   Normal range of motion.  Cardiovascular:  Normal rate and intact distal pulses.           Pulmonary/Chest: No respiratory distress.   Musculoskeletal:         General: Tenderness (Diffuse, mild tenderness and mild-to-moderate swelling to the right knee; no point or bony tenderness; no erythema, induration, fluctuance, not warm to the touch) present. No edema. Normal range of motion.      Cervical back: Normal range of motion and neck supple.      Neurological: She is alert and oriented to person, place, and time. She has normal strength. No cranial nerve deficit. GCS score is 15. GCS eye subscore is 4. GCS verbal subscore is 5. GCS motor subscore is 6.   Skin: Skin is warm and dry.         ED Course   Procedures  Labs Reviewed - No data to display           X-Rays:   Independently Interpreted Readings:   Other Readings:  X-ray right knee independently interpreted by me pending radiology review: No acute fracture or dislocation appreciated    Imaging Results              X-Ray Knee 3 View Right (Final result)  Result time 12/19/24 21:22:59      Final result by Pan Mcgovern MD (12/19/24 21:22:59)                   Impression:      Stable tri compartment osteoarthritis of the right knee.      Electronically signed by: Pan Mcgovern  Date:    12/19/2024  Time:    21:22               Narrative:    EXAMINATION:  XR KNEE 3 VIEW RIGHT    CLINICAL HISTORY:  Pain, unspecified    TECHNIQUE:  AP, lateral, and Merchant views of the right knee were performed.    COMPARISON:  Of 04/25/2024    FINDINGS:  Tricompartment osteoarthritis without fracture or dislocation.  Medial compartment remains asymmetrically narrowed.  Small suprapatellar bursa effusion present.                                      Medications   dexAMETHasone injection 8 mg (8 mg Intramuscular Given 12/19/24 2042)     Medical Decision Making  59-year-old female presents emergency department complaining of right knee pain and swelling      Differential: Arthritis, contusion, sprain, strain, fracture, dislocation,      Patient given IM Decadron.  On re-evaluation reports some improvement in symptoms.  Informed her of results as well as plan to discharge with instructions on home management, over-the-counter medications, follow up with primary care physician, strict return precautions given.  Vital signs stable.    Problems Addressed:  Acute pain of right knee: acute illness or injury    Amount  and/or Complexity of Data Reviewed  External Data Reviewed: notes.     Details: Reviewed most recent pain medicine note documenting baseline medications and past medical history  Radiology: ordered and independent interpretation performed. Decision-making details documented in ED Course.    Risk  OTC drugs.  Prescription drug management.  Parenteral controlled substances.                                      Clinical Impression:  Final diagnoses:  [R52] Pain  [M25.561] Acute pain of right knee (Primary)          ED Disposition Condition    Discharge Stable          ED Prescriptions    None       Follow-up Information       Follow up With Specialties Details Why Contact Info    Kelley Badillo NP Family Medicine Schedule an appointment as soon as possible for a visit   [address] [facility]  [address]  [facility] [address]  [phone]               Pio Posadas MD  12/19/24 9820

## 2024-12-20 NOTE — FIRST PROVIDER EVALUATION
Emergency Department TeleTriage Encounter Note      CHIEF COMPLAINT    Chief Complaint   Patient presents with    Knee Pain     C/o R knee pain w/ swelling and difficulties bending knee x3 days. Pt reports hx of R knee problems. Pt took naproxen for pain w/o sx relief. Denies other sx or complaints.        VITAL SIGNS   Initial Vitals [12/19/24 1841]   BP Pulse Resp Temp SpO2   133/82 68 20 98.3 °F (36.8 °C) 99 %      MAP       --            ALLERGIES    Review of patient's allergies indicates:   Allergen Reactions    Paroxetine hcl Rash and Nausea And Vomiting       PROVIDER TRIAGE NOTE  Verbal consent for the teletriage evaluation was given by the patient at the start of the evaluation.  All efforts will be made to maintain patient's privacy during the evaluation.      This is a teletriage evaluation of a 59 y.o. female presenting to the ED with c/o right knee pain and swelling that started 3 days ago.  Had a fall 3 days ago re-injuring the knee. Limited physical exam via telehealth: The patient is awake, alert, answering questions appropriately and is not in respiratory distress.  As the Teletriage provider, I performed an initial assessment and ordered appropriate labs and imaging studies, if any, to facilitate the patient's care once placed in the ED. Once a room is available, care and a full evaluation will be completed by an alternate ED provider.  Any additional orders and the final disposition will be determined by that provider.  All imaging and labs will not be followed-up by the Teletriage Team, including myself.       ORDERS  Labs Reviewed - No data to display    ED Orders (720h ago, onward)      Start Ordered     Status Ordering Provider    12/19/24 1859 12/19/24 1858  X-Ray Knee 3 View Right  1 time imaging         Ordered MACEY WALSH            Virtual Visit Note: The provider triage portion of this emergency department evaluation and documentation was performed via [employer], a HIPAA-compliant  telemedicine application, in concert with a tele-presenter in the room. A face to face patient evaluation with one of my colleagues will occur once the patient is placed in an emergency department room.      DISCLAIMER: This note was prepared with [employer] voice recognition transcription software. Garbled syntax, mangled pronouns, and other bizarre constructions may be attributed to that software system.

## 2024-12-20 NOTE — ED TRIAGE NOTES
Pt presents to ED today c/o swelling to right knee   Denies injury or trauma   Concerned for blood clot   Denies HX ,CP , or SOB

## 2024-12-20 NOTE — DISCHARGE INSTRUCTIONS
I recommend taking ibuprofen 600 mg every 6 hours with food and/or Tylenol 650 mg every 6 hours as needed for pain and swelling.  Please follow-up with your primary care physician and/or pain management physician for further evaluation and management.  Please return with any new or worsening symptoms.

## 2024-12-27 ENCOUNTER — NURSE TRIAGE (OUTPATIENT)
Dept: ADMINISTRATIVE | Facility: CLINIC | Age: 59
End: 2024-12-27
Payer: COMMERCIAL

## 2024-12-28 NOTE — TELEPHONE ENCOUNTER
Pt calling regarding knee pain/swelling. Phone was disconnected, prior to completing triage. I have attempted call back with no success. LVM for return call.    Reason for Disposition   Unable to complete triage due to phone connection issues    Protocols used: No Contact or Duplicate Contact Call-A-AH

## 2024-12-28 NOTE — TELEPHONE ENCOUNTER
Left VM with call back #s  Reason for Disposition   Message left on unidentified voice mail.  Phone number verified.    Protocols used: No Contact or Duplicate Contact Call-A-AH

## 2025-01-07 ENCOUNTER — OFFICE VISIT (OUTPATIENT)
Dept: URGENT CARE | Facility: CLINIC | Age: 60
End: 2025-01-07
Payer: OTHER GOVERNMENT

## 2025-01-07 VITALS
HEART RATE: 78 BPM | DIASTOLIC BLOOD PRESSURE: 83 MMHG | RESPIRATION RATE: 20 BRPM | TEMPERATURE: 99 F | WEIGHT: 149.94 LBS | HEIGHT: 63 IN | OXYGEN SATURATION: 98 % | BODY MASS INDEX: 26.57 KG/M2 | SYSTOLIC BLOOD PRESSURE: 141 MMHG

## 2025-01-07 DIAGNOSIS — S39.012D STRAIN OF LUMBAR REGION, SUBSEQUENT ENCOUNTER: ICD-10-CM

## 2025-01-07 DIAGNOSIS — Z02.6 ENCOUNTER RELATED TO WORKER'S COMPENSATION CLAIM: ICD-10-CM

## 2025-01-07 DIAGNOSIS — S39.012A ACUTE MYOFASCIAL STRAIN OF LUMBAR REGION, INITIAL ENCOUNTER: ICD-10-CM

## 2025-01-07 DIAGNOSIS — M54.42 CHRONIC LEFT-SIDED LOW BACK PAIN WITH LEFT-SIDED SCIATICA: Primary | ICD-10-CM

## 2025-01-07 DIAGNOSIS — G89.29 CHRONIC LEFT-SIDED LOW BACK PAIN WITH LEFT-SIDED SCIATICA: Primary | ICD-10-CM
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
## 2025-01-07 DIAGNOSIS — M51.369 ANNULAR TEAR OF LUMBAR DISC: ICD-10-CM

## 2025-01-07 PROCEDURE — 99213 OFFICE O/P EST LOW 20 MIN: CPT | Mod: S$GLB,,, | Performed by: PHYSICIAN ASSISTANT

## 2025-01-07 RX ORDER — DICLOFENAC SODIUM 10 MG/G
2 GEL TOPICAL 3 TIMES DAILY PRN
Qty: 100 G | Refills: 2 | Status: SHIPPED | OUTPATIENT
Start: 2025-01-07

## 2025-01-07 RX ORDER — METHOCARBAMOL 500 MG/1
500 TABLET, FILM COATED ORAL 2 TIMES DAILY PRN
Qty: 30 TABLET | Refills: 1 | Status: SHIPPED | OUTPATIENT
Start: 2025-01-07

## 2025-01-07 NOTE — LETTER
[facility] [address] [address]  Phone: [phone]  Fax: [phone]  [facility]: [phone]    Pt Name: Mary Alice Garcia  Injury Date: 08/19/2023   Employee ID: [id number] Date of Treatment: 01/07/2025   Company: [employer]      Appointment Time: 11:15 AM Arrived:  11:35 AM   Provider: Garrick Hernandez PA-C Time Out: 1:10 PM     Office Treatment:   1. Chronic left-sided low back pain with left-sided sciatica    2. Encounter related to worker's compensation claim    3. Annular tear of lumbar disc    4. Strain of lumbar region, subsequent encounter    5. Work related injury    6. Acute myofascial strain of lumbar region, initial encounter      Medications Ordered This Encounter   Medications    diclofenac sodium (VOLTAREN) 1 % Gel    methocarbamoL (ROBAXIN) 500 MG Tab      Patient Instructions: Attention not to aggravate affected area, Daily home exercises/warm soaks      Restrictions:  (See CA-17)     Return Appointment: 3/4/2025 at 8:30 AM    TABATHA

## 2025-01-07 NOTE — PROGRESS NOTES
"Subjective:      Patient ID: Mary Alice Garcia is a 59 y.o. female.    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  Patient states she continues to have significant pain about the left low back with intermittent radiation down the left leg.  Patient was seen by pain management, however was not able to get her procedure due to insurance approval.  Patient states she has been in contact with her  who reports that the "code" is wrong that was submitted by the pain management clinic.  Patient states the  supposedly talked directly with the pain management  who made the exact corrections suggested by the  and resubmitted the claim.  This occurred the week before Porter.  She has yet to hear from her  or the pain Clinic as to whether or not the procedure has been approved.  In the meantime she continues to have significant pain and has run out of prescription medications.  She is here for refills. MEB    Back Pain  Pertinent negatives include no numbness.     Constitution: Positive for activity change.   Musculoskeletal:  Positive for pain and back pain.   Neurological:  Negative for numbness and tingling.     Objective:     Physical Exam  Vitals and nursing note reviewed.   Constitutional:       General: She is not in acute distress.     Appearance: She is well-developed. She is not diaphoretic.   HENT:      Head: Normocephalic and atraumatic.      Right Ear: Hearing and external ear normal.      Left Ear: Hearing and external ear normal.      Nose: Nose normal. No nasal deformity.   Eyes:      General: Lids are " normal. No scleral icterus.     Conjunctiva/sclera: Conjunctivae normal.   Neck:      Trachea: Trachea normal.   Cardiovascular:      Pulses: Normal pulses.   Pulmonary:      Effort: Pulmonary effort is normal. No respiratory distress.      Breath sounds: No stridor.   Musculoskeletal:      Cervical back: Normal range of motion.   Skin:     General: Skin is warm and dry.      Capillary Refill: Capillary refill takes less than 2 seconds.   Neurological:      Mental Status: She is alert. She is not disoriented.      GCS: GCS eye subscore is 4. GCS verbal subscore is 5. GCS motor subscore is 6.      Sensory: No sensory deficit.   Psychiatric:         Attention and Perception: She is attentive.         Speech: Speech normal.         Behavior: Behavior normal.        Assessment:      1. Chronic left-sided low back pain with left-sided sciatica    2. Encounter related to worker's compensation claim    3. Annular tear of lumbar disc    4. Strain of lumbar region, subsequent encounter    5. Work related injury    6. Acute myofascial strain of lumbar region, initial encounter      Plan:       I will notify [facility] to coordinate getting pain management injection approved.  Patient to contact her  again to verify approval.  In the meantime continue with home exercises and warm soaks.  Patient to continue with light duty.  Follow-up in 2 months or sooner as needed.  I will wait to see results from pain management procedure before conducting FCE and determining MMI.    Medications Ordered This Encounter   Medications    diclofenac sodium (VOLTAREN) 1 % Gel     Sig: Apply 2 g topically 3 (three) times daily as needed (pain).     Dispense:  100 g     Refill:  2    methocarbamoL (ROBAXIN) 500 MG Tab     Sig: Take 1 tablet (500 mg total) by mouth 2 (two) times daily as needed (muscle spasms).     Dispense:  30 tablet     Refill:  1     Patient Instructions: Attention not to aggravate affected area, Daily home exercises/warm  soaks   Restrictions:  (See CA-17)  Follow up in about 8 weeks (around 3/4/2025) for Reassessment.

## 2025-01-08 ENCOUNTER — TELEPHONE (OUTPATIENT)
Dept: PAIN MEDICINE | Facility: CLINIC | Age: 60
End: 2025-01-08
Payer: COMMERCIAL

## 2025-01-08 DIAGNOSIS — S39.012A BACK STRAIN, INITIAL ENCOUNTER: Primary | ICD-10-CM

## 2025-01-08 NOTE — TELEPHONE ENCOUNTER
"Sending new request for SI joint injection under muscle strain ICD10 code.    ----- Message from Nurse Hernandez sent at 1/7/2025  1:35 PM CST -----  This patient's injury is covered by the Department of Labor.  They are very specific when it comes to the "accepted condition."  For this patient, to have the injection addressed, the referral has to contain the diagnosis:    [id number] - STRAIN OF MUSCLE, FASCIA AND TENDON OF LOWER BACK, INIT    If you would, enter a new referral with this diagnosis code and I'll follow up to get it re-submitted.    Thanks  Silvano  "

## 2025-01-15 ENCOUNTER — TELEPHONE (OUTPATIENT)
Dept: GASTROENTEROLOGY | Facility: CLINIC | Age: 60
End: 2025-01-15
Payer: COMMERCIAL

## 2025-01-15 NOTE — TELEPHONE ENCOUNTER
----- Message from Thi sent at 1/15/2025 10:15 AM CST -----  Type:  Sooner Apoointment Request ( Gastro Department)    Caller is requesting a sooner appointment.    Name of Caller:Ms Wheat   When is the first available appointment?May   Symptoms:blood in stool  Would the patient rather a call back or a response via [facility]? Call   Best Call Back Number: [phone]  Additional Information: she had an appt for today but, could not make it to the office she had to go back home have diarrhea

## 2025-01-27 ENCOUNTER — TELEPHONE (OUTPATIENT)
Dept: BARIATRICS | Facility: CLINIC | Age: 60
End: 2025-01-27
Payer: COMMERCIAL

## 2025-01-27 NOTE — TELEPHONE ENCOUNTER
----- Message from Tamar sent at 1/27/2025 12:47 PM CST -----  Regarding: Schedule Appt  Contact: SHOLA NICE [[id number]]  Returning a Missed Call    Caller: SHOLA NICE [[id number]]    Returning call to :  Dayan    Caller can be reached @: [phone]    Nature of the call:  Missed Call concerning scheduling appt

## 2025-01-27 NOTE — TELEPHONE ENCOUNTER
----- Message from Tamar sent at 1/27/2025 12:47 PM CST -----  Regarding: Schedule Appt  Contact: SHOLA NICE [[id number]]  Returning a Missed Call    Caller: SHOLA NICE [[id number]]    Returning call to :  Dayan    Caller can be reached @: [phone]    Nature of the call:  Missed Call concerning scheduling appt

## 2025-01-27 NOTE — TELEPHONE ENCOUNTER
Pt is interested in bariatric medications, scheduled FC phone call appt. Pt stated she has not been in the 140's in a very long time . Weight on 1/7/25 is incorrect. Pt stated has not been measured in years and may be 5'2 or 5'3 and weights about 155 lbs to 160 . Informed pt her BMI has to be at Least 28 with a cor mobilitiy. Pt has hypertension .

## 2025-02-04 ENCOUNTER — TELEPHONE (OUTPATIENT)
Dept: URGENT CARE | Facility: CLINIC | Age: 60
End: 2025-02-04
Payer: COMMERCIAL

## 2025-02-05 ENCOUNTER — TELEPHONE (OUTPATIENT)
Dept: GASTROENTEROLOGY | Facility: CLINIC | Age: 60
End: 2025-02-05
Payer: COMMERCIAL

## 2025-02-05 NOTE — TELEPHONE ENCOUNTER
Attempted to contact the patient to r/s 2/6 appt with Espinoza. Pt didn't answer and I left a voicemail to return to our office to r/s the appt.

## 2025-02-11 ENCOUNTER — TELEPHONE (OUTPATIENT)
Dept: URGENT CARE | Facility: CLINIC | Age: 60
End: 2025-02-11
Payer: COMMERCIAL

## 2025-02-11 NOTE — TELEPHONE ENCOUNTER
Left tere in regards to wc/rv appt changed to 3/11/25 and to call back if day/time does not work for her/patient.    Zohreh    Also called secondary number/son and left a message call us at her soonest convenience in regards to her upcoming appt.

## 2025-03-26 ENCOUNTER — LAB VISIT (OUTPATIENT)
Dept: LAB | Facility: HOSPITAL | Age: 60
End: 2025-03-26
Attending: NURSE PRACTITIONER
Payer: COMMERCIAL

## 2025-03-26 ENCOUNTER — OFFICE VISIT (OUTPATIENT)
Dept: PRIMARY CARE CLINIC | Facility: CLINIC | Age: 60
End: 2025-03-26
Payer: COMMERCIAL

## 2025-03-26 VITALS
HEART RATE: 63 BPM | DIASTOLIC BLOOD PRESSURE: 82 MMHG | HEIGHT: 63 IN | SYSTOLIC BLOOD PRESSURE: 122 MMHG | BODY MASS INDEX: 28.71 KG/M2 | WEIGHT: 162.06 LBS | OXYGEN SATURATION: 99 % | RESPIRATION RATE: 16 BRPM

## 2025-03-26 DIAGNOSIS — Z00.00 ANNUAL PHYSICAL EXAM: Primary | ICD-10-CM

## 2025-03-26 DIAGNOSIS — G89.29 CHRONIC PAIN OF BOTH KNEES: ICD-10-CM

## 2025-03-26 DIAGNOSIS — I10 ESSENTIAL HYPERTENSION: ICD-10-CM

## 2025-03-26 DIAGNOSIS — G43.809 OTHER MIGRAINE WITHOUT STATUS MIGRAINOSUS, NOT INTRACTABLE: ICD-10-CM

## 2025-03-26 DIAGNOSIS — R73.01 IFG (IMPAIRED FASTING GLUCOSE): ICD-10-CM

## 2025-03-26 DIAGNOSIS — Z13.5 SCREENING FOR EYE CONDITION: ICD-10-CM

## 2025-03-26 DIAGNOSIS — Z13.6 ENCOUNTER FOR LIPID SCREENING FOR CARDIOVASCULAR DISEASE: ICD-10-CM

## 2025-03-26 DIAGNOSIS — M25.561 CHRONIC PAIN OF BOTH KNEES: ICD-10-CM

## 2025-03-26 DIAGNOSIS — Z00.00 ANNUAL PHYSICAL EXAM: ICD-10-CM

## 2025-03-26 DIAGNOSIS — E03.9 ACQUIRED HYPOTHYROIDISM: ICD-10-CM

## 2025-03-26 DIAGNOSIS — F33.1 MODERATE EPISODE OF RECURRENT MAJOR DEPRESSIVE DISORDER: ICD-10-CM

## 2025-03-26 DIAGNOSIS — M25.562 CHRONIC PAIN OF BOTH KNEES: ICD-10-CM

## 2025-03-26 DIAGNOSIS — E66.3 OVERWEIGHT (BMI 25.0-29.9): ICD-10-CM

## 2025-03-26 DIAGNOSIS — K59.00 CONSTIPATION, UNSPECIFIED CONSTIPATION TYPE: ICD-10-CM

## 2025-03-26 DIAGNOSIS — K21.9 GASTROESOPHAGEAL REFLUX DISEASE WITHOUT ESOPHAGITIS: ICD-10-CM

## 2025-03-26 DIAGNOSIS — Z13.1 SCREENING FOR DIABETES MELLITUS: ICD-10-CM

## 2025-03-26 DIAGNOSIS — Z13.220 ENCOUNTER FOR LIPID SCREENING FOR CARDIOVASCULAR DISEASE: ICD-10-CM

## 2025-03-26 DIAGNOSIS — Z12.31 ENCOUNTER FOR SCREENING MAMMOGRAM FOR MALIGNANT NEOPLASM OF BREAST: ICD-10-CM

## 2025-03-26 LAB
ABSOLUTE EOSINOPHIL (OHS): 0.51 K/UL
ABSOLUTE MONOCYTE (OHS): 0.52 K/UL (ref 0.3–1)
ABSOLUTE NEUTROPHIL COUNT (OHS): 4.3 K/UL (ref 1.8–7.7)
ALBUMIN SERPL BCP-MCNC: 3.9 G/DL (ref 3.5–5.2)
ALP SERPL-CCNC: 66 UNIT/L (ref 40–150)
ALT SERPL W/O P-5'-P-CCNC: 42 UNIT/L (ref 10–44)
ANION GAP (OHS): 11 MMOL/L (ref 8–16)
AST SERPL-CCNC: 36 UNIT/L (ref 11–45)
BASOPHILS # BLD AUTO: 0.08 K/UL
BASOPHILS NFR BLD AUTO: 0.9 %
BILIRUB SERPL-MCNC: 0.6 MG/DL (ref 0.1–1)
BUN SERPL-MCNC: 10 MG/DL (ref 6–20)
CALCIUM SERPL-MCNC: 9.5 MG/DL (ref 8.7–10.5)
CHLORIDE SERPL-SCNC: 103 MMOL/L (ref 95–110)
CHOLEST SERPL-MCNC: 204 MG/DL (ref 120–199)
CHOLEST/HDLC SERPL: 5.1 {RATIO} (ref 2–5)
CO2 SERPL-SCNC: 24 MMOL/L (ref 23–29)
CREAT SERPL-MCNC: 0.8 MG/DL (ref 0.5–1.4)
EAG (OHS): 117 MG/DL (ref 68–131)
ERYTHROCYTE [DISTWIDTH] IN BLOOD BY AUTOMATED COUNT: 12.2 % (ref 11.5–14.5)
GFR SERPLBLD CREATININE-BSD FMLA CKD-EPI: >60 ML/MIN/1.73/M2
GLUCOSE SERPL-MCNC: 97 MG/DL (ref 70–110)
HBA1C MFR BLD: 5.7 % (ref 4–5.6)
HCT VFR BLD AUTO: 42.3 % (ref 37–48.5)
HDLC SERPL-MCNC: 40 MG/DL (ref 40–75)
HDLC SERPL: 19.6 % (ref 20–50)
HGB BLD-MCNC: 13.8 GM/DL (ref 12–16)
IMM GRANULOCYTES # BLD AUTO: 0.03 K/UL (ref 0–0.04)
IMM GRANULOCYTES NFR BLD AUTO: 0.3 % (ref 0–0.5)
LDLC SERPL CALC-MCNC: 131 MG/DL (ref 63–159)
LYMPHOCYTES # BLD AUTO: 3.63 K/UL (ref 1–4.8)
MCH RBC QN AUTO: 28.9 PG (ref 27–50)
MCHC RBC AUTO-ENTMCNC: 32.6 G/DL (ref 32–36)
MCV RBC AUTO: 89 FL (ref 82–98)
NONHDLC SERPL-MCNC: 164 MG/DL
NUCLEATED RBC (/100WBC) (OHS): 0 /100 WBC
PLATELET # BLD AUTO: 299 K/UL (ref 150–450)
PMV BLD AUTO: 10.7 FL (ref 9.2–12.9)
POTASSIUM SERPL-SCNC: 4.8 MMOL/L (ref 3.5–5.1)
PROT SERPL-MCNC: 7.6 GM/DL (ref 6–8.4)
RBC # BLD AUTO: 4.78 M/UL (ref 4–5.4)
RELATIVE EOSINOPHIL (OHS): 5.6 %
RELATIVE LYMPHOCYTE (OHS): 40 % (ref 18–48)
RELATIVE MONOCYTE (OHS): 5.7 % (ref 4–15)
RELATIVE NEUTROPHIL (OHS): 47.5 % (ref 38–73)
SODIUM SERPL-SCNC: 138 MMOL/L (ref 136–145)
T4 FREE SERPL-MCNC: 1.13 NG/DL (ref 0.71–1.51)
TRIGL SERPL-MCNC: 165 MG/DL (ref 30–150)
TSH SERPL-ACNC: 0.92 UIU/ML (ref 0.4–4)
WBC # BLD AUTO: 9.07 K/UL (ref 3.9–12.7)

## 2025-03-26 PROCEDURE — 99396 PREV VISIT EST AGE 40-64: CPT | Mod: S$GLB,,, | Performed by: NURSE PRACTITIONER

## 2025-03-26 PROCEDURE — 1160F RVW MEDS BY RX/DR IN RCRD: CPT | Mod: CPTII,S$GLB,, | Performed by: NURSE PRACTITIONER

## 2025-03-26 PROCEDURE — 82040 ASSAY OF SERUM ALBUMIN: CPT

## 2025-03-26 PROCEDURE — 80061 LIPID PANEL: CPT

## 2025-03-26 PROCEDURE — 84439 ASSAY OF FREE THYROXINE: CPT

## 2025-03-26 PROCEDURE — 3079F DIAST BP 80-89 MM HG: CPT | Mod: CPTII,S$GLB,, | Performed by: NURSE PRACTITIONER

## 2025-03-26 PROCEDURE — 83036 HEMOGLOBIN GLYCOSYLATED A1C: CPT

## 2025-03-26 PROCEDURE — 3008F BODY MASS INDEX DOCD: CPT | Mod: CPTII,S$GLB,, | Performed by: NURSE PRACTITIONER

## 2025-03-26 PROCEDURE — 84443 ASSAY THYROID STIM HORMONE: CPT

## 2025-03-26 PROCEDURE — 1159F MED LIST DOCD IN RCRD: CPT | Mod: CPTII,S$GLB,, | Performed by: NURSE PRACTITIONER

## 2025-03-26 PROCEDURE — 3044F HG A1C LEVEL LT 7.0%: CPT | Mod: CPTII,S$GLB,, | Performed by: NURSE PRACTITIONER

## 2025-03-26 PROCEDURE — 99999 PR PBB SHADOW E&M-EST. PATIENT-LVL V: CPT | Mod: PBBFAC,,, | Performed by: NURSE PRACTITIONER

## 2025-03-26 PROCEDURE — 36415 COLL VENOUS BLD VENIPUNCTURE: CPT

## 2025-03-26 PROCEDURE — 3074F SYST BP LT 130 MM HG: CPT | Mod: CPTII,S$GLB,, | Performed by: NURSE PRACTITIONER

## 2025-03-26 PROCEDURE — 85025 COMPLETE CBC W/AUTO DIFF WBC: CPT

## 2025-03-26 RX ORDER — GLUCOSAMINE SULFATE 500 MG
TABLET ORAL
COMMUNITY
Start: 2025-01-06

## 2025-03-26 RX ORDER — MECOBALAMIN 1000 MCG
TABLET,CHEWABLE ORAL
COMMUNITY
Start: 2025-03-21

## 2025-03-26 RX ORDER — MELOXICAM 15 MG/1
15 TABLET ORAL DAILY
COMMUNITY
Start: 2025-03-08

## 2025-03-26 RX ORDER — SUMATRIPTAN SUCCINATE 50 MG/1
50 TABLET ORAL
Qty: 30 TABLET | Refills: 0 | Status: SHIPPED | OUTPATIENT
Start: 2025-03-26 | End: 2025-06-20

## 2025-03-26 RX ORDER — BIOTIN 1000 MCG
TABLET,CHEWABLE ORAL
COMMUNITY
Start: 2024-11-23

## 2025-03-26 NOTE — PROGRESS NOTES
[facility] Note    Chief Complaint      Chief Complaint   Patient presents with    Annual Exam     History of Present Illness      Mary Alice Garcia is a 60 y.o. female patient with chronic conditions of depression, hypertension, hypothyroid, GERD who presents today for annual  .  Labs- ordered  Eye exams- glasses  Gyn- [facility]  Mammo- ordered  Colon-2023, repeat in 10 years    Vaccines:  Tetanus, shingles and pneumonia due    History of Present Illness    CHIEF COMPLAINT:  Mary Alice presents today for follow up of headaches    HEADACHE:  She reports experiencing headaches for the past 3 months. She uses Goody's powder for relief, which she carries regularly. Fioricet was previously ineffective for headache management. She continues propranolol twice daily.    MUSCULOSKELETAL:  She reports severe knee pain with associated swelling. Previous steroid injection from orthopedics provided temporary relief but symptoms are returning. Meloxicam 15mg is not providing adequate pain relief. She also experiences weather-dependent arthritis symptoms affecting the ankle and toe.    GASTROINTESTINAL:  She takes stool softener daily for frequent constipation.      ROS:  Gastrointestinal: +constipation  Musculoskeletal: +joint pain, +limb pain, +pain with movement, +joint swelling  Neurological: +headache         Health Maintenance   Topic Date Due    TETANUS VACCINE  Never done    Shingles Vaccine (1 of 2) Never done    Pneumococcal Vaccines (Age 50+) (1 of 1 - PCV) Never done    Mammogram  07/24/2024    COVID-19 Vaccine (1 - 2024-25 season) Never done    Influenza Vaccine (Season Ended) 09/01/2025    Hemoglobin A1c (Prediabetes)  03/26/2026    Cervical Cancer Screening  06/12/2028    Lipid Panel  03/26/2030    Colorectal Cancer Screening  10/25/2033    RSV Vaccine (Age 60+ and Pregnant patients) (1 - 1-dose 75+ series) 04/01/2042    Hepatitis C Screening  Completed    HIV Screening  Completed       Past Medical  "History:   Diagnosis Date    Arthritis     Depression     Hyperlipidemia     Hypertension         Syncope and collapse     Thyroid disease     Tuberculosis        Past Surgical History:   Procedure Laterality Date    BUNIONECTOMY Left      SECTION      COLONOSCOPY N/A 10/25/2023    Procedure: COLONOSCOPY;  Surgeon: Gadiel De MD;  Location: [facility];  Service: Endoscopy;  Laterality: N/A;    ESOPHAGOGASTRODUODENOSCOPY  2020    ESOPHAGOGASTRODUODENOSCOPY N/A 2020    Procedure: EGD (ESOPHAGOGASTRODUODENOSCOPY);  Surgeon: Rio Galeano MD;  Location: [facility];  Service: Endoscopy;  Laterality: N/A;    ESOPHAGOGASTRODUODENOSCOPY N/A 10/25/2023    Procedure: EGD (ESOPHAGOGASTRODUODENOSCOPY);  Surgeon: Gadiel De MD;  Location: [facility];  Service: Endoscopy;  Laterality: N/A;    HEMORRHOID SURGERY      KNEE SURGERY      right knee    thyroid removed      TONSILLECTOMY      TUBAL LIGATION         family history includes Breast cancer in her mother and sister; Cancer in her maternal aunt and mother; Diabetes in her maternal grandmother and paternal grandmother; Drug abuse in her maternal aunt; Hyperlipidemia in her father; Hypertension in her brother and mother.    Social History[1]    Encounter Medications[2]     Review of patient's allergies indicates:   Allergen Reactions    Paroxetine hcl Rash and Nausea And Vomiting       Physical Exam      Vital Signs  Pulse: 63  Resp: 16  SpO2: 99 %  BP: 122/82  BP Location: Right arm  Patient Position: Sitting  Height and Weight  Height: 5' 3" (160 cm)  Weight: 73.5 kg (162 lb 0.6 oz)  BSA (Calculated - sq m): 1.81 sq meters  BMI (Calculated): 28.7  Weight in (lb) to have BMI = 25: 140.8    Physical Exam  Vitals and nursing note reviewed.   Constitutional:       General: She is not in acute distress.     Appearance: Normal appearance. She is well-developed. She is not ill-appearing.   HENT:      Head: Normocephalic and " atraumatic.      Right Ear: External ear normal.      Left Ear: External ear normal.   Eyes:      Conjunctiva/sclera: Conjunctivae normal.      Pupils: Pupils are equal, round, and reactive to light.   Neck:      Thyroid: No thyromegaly.      Vascular: No JVD.      Trachea: No tracheal deviation.   Cardiovascular:      Rate and Rhythm: Normal rate and regular rhythm.      Heart sounds: Normal heart sounds. No murmur heard.  Pulmonary:      Effort: Pulmonary effort is normal.      Breath sounds: Normal breath sounds.   Chest:      Chest wall: No tenderness.   Abdominal:      General: Bowel sounds are normal.      Palpations: Abdomen is soft.      Tenderness: There is no abdominal tenderness. There is no guarding.   Musculoskeletal:         General: Normal range of motion.      Cervical back: Normal range of motion and neck supple.   Lymphadenopathy:      Cervical: No cervical adenopathy.   Skin:     General: Skin is warm and dry.   Neurological:      General: No focal deficit present.      Mental Status: She is alert and oriented to person, place, and time.   Psychiatric:         Mood and Affect: Mood normal.         Behavior: Behavior normal.         Thought Content: Thought content normal.         Judgment: Judgment normal.          Laboratory:  CBC:  Lab Results   Component Value Date    WBC 9.07 03/26/2025    RBC 4.78 03/26/2025    HGB 13.8 03/26/2025    HCT 42.3 03/26/2025     03/26/2025    MCV 89 03/26/2025    MCH 28.9 03/26/2025    MCHC 32.6 03/26/2025    MCHC 33.0 08/20/2024    MCHC 34.4 07/16/2024     CMP:  Lab Results   Component Value Date    GLU 97 03/26/2025    CALCIUM 9.5 03/26/2025    ALBUMIN 3.9 03/26/2025    PROT 7.6 03/26/2025     03/26/2025    K 4.8 03/26/2025    CO2 24 03/26/2025     03/26/2025    BUN 10 03/26/2025    ALKPHOS 66 03/26/2025    ALT 42 03/26/2025    AST 36 03/26/2025    BILITOT 0.6 03/26/2025    BILITOT 0.8 07/16/2024    BILITOT 0.5 03/13/2024     URINALYSIS:  Lab  Results   Component Value Date    COLORU Yellow 10/15/2019    SPECGRAV <=1.005 (A) 10/15/2019    PHUR 7 11/08/2023    PROTEINUA Negative 10/15/2019    BACTERIA Rare 07/05/2012    NITRITE Negative 10/15/2019    LEUKOCYTESUR Negative 10/15/2019    UROBILINOGEN Negative 10/15/2019      LIPIDS:  Lab Results   Component Value Date    TSH 0.923 03/26/2025    TSH 0.091 (L) 08/20/2024    TSH 0.223 (L) 07/16/2024    HDL 40 03/26/2025    HDL 44 07/16/2024    HDL 53 07/11/2023    CHOL 204 (H) 03/26/2025    CHOL 191 07/16/2024    CHOL 218 (H) 07/11/2023    TRIG 165 (H) 03/26/2025    TRIG 337 (H) 07/16/2024    TRIG 142 07/11/2023    LDLCALC 131.0 03/26/2025    LDLCALC 79.6 07/16/2024    LDLCALC 136.6 07/11/2023    CHOLHDL 19.6 (L) 03/26/2025    CHOLHDL 23.0 07/16/2024    CHOLHDL 24.3 07/11/2023    NONHDLCHOL 164 03/26/2025    NONHDLCHOL 147 07/16/2024    NONHDLCHOL 165 07/11/2023    TOTALCHOLEST 5.1 (H) 03/26/2025    TOTALCHOLEST 4.3 07/16/2024    TOTALCHOLEST 4.1 07/11/2023     TSH:  Lab Results   Component Value Date    TSH 0.923 03/26/2025    TSH 0.091 (L) 08/20/2024    TSH 0.223 (L) 07/16/2024     A1C:  Lab Results   Component Value Date    HGBA1C 5.7 (H) 03/26/2025    HGBA1C 5.7 (H) 03/13/2024    HGBA1C 5.4 07/11/2023    HGBA1C 5.6 08/14/2020         Assessment/Plan     Mary Alice Garcia is a 60 y.o.female with:    Assessment & Plan      IMPRESSION:  - Considered and started Imitrex for persistent headaches, potentially related to menopause.  - Evaluated knee pain, noting it may be due to bone-on-bone contact rather than arthritis.  - Assessed current pain management regimen, including Meloxicam 15 mg, finding it ineffective at the highest dose.  - Recommend steroid injections for knee pain relief, emphasizing importance of orthopedic administration for safety.  - Discussed potential link between menopause and headaches.    MODERATE EPISODE OF RECURRENT MAJOR DEPRESSIVE DISORDER:  - Acknowledged patient's improved mood due to  new job and removal from stressful work environment, despite initial report of feeling depressed due to son moving back in and financial struggles.  - Noted previous treatment with sertraline, but current treatment not specified given the improved mood and situation.  - Advised to continue monitoring mood and report any significant changes.    TENSION-TYPE HEADACHE:  - Mary Alice reports daily headaches for the last 3 months, effectively relieved by Goody's powder.  - Butalbital/acetaminophen/caffeine combination was ineffective.  - Considered potential relationship with menopause.  - Recommend trial of sumatriptan for relief.  - Advised monitoring frequency and severity of headaches.  - Continued propranolol at current dose of 2 tablets daily.    KNEE PAIN:  - Mary Alice reports severe knee pain and swelling, likely due to bone-on-bone contact rather than arthritis.  - Recent orthopedic knee injection's effects are diminishing, and meloxicam 15mg is ineffective for pain relief.  - Knee surgery is needed but constrained by financial limitations.  - Recommend periodic corticosteroid injections from an orthopedic specialist, emphasizing proper technique to avoid infection risks.  - Advised monitoring pain levels and reporting significant changes.    ANKLE AND FOOT PAIN:  - Mary Alice reports occasional pain in ankle and toe, possibly related to weather changes.  - Overall foot problems have improved.  - Advised continued monitoring of symptoms and reporting any worsening.    CONSTIPATION:  - Mary Alice reports frequent constipation, currently using daily stool softeners.  - Advised increasing fluid intake and dietary fiber.      Annual physical exam  -     CBC Auto Differential; Future; Expected date: 03/26/2025  -     Comprehensive Metabolic Panel; Future; Expected date: 03/26/2025  -     Hemoglobin A1C; Future; Expected date: 03/26/2025  -     TSH; Future; Expected date: 03/26/2025  -     T4, Free; Future; Expected date: 03/26/2025  -      Lipid Panel; Future; Expected date: 03/26/2025    Essential hypertension  -     CBC Auto Differential; Future; Expected date: 03/26/2025  -     Comprehensive Metabolic Panel; Future; Expected date: 03/26/2025  -     Hemoglobin A1C; Future; Expected date: 03/26/2025  -     TSH; Future; Expected date: 03/26/2025  -     T4, Free; Future; Expected date: 03/26/2025  -     Lipid Panel; Future; Expected date: 03/26/2025    Acquired hypothyroidism  -     CBC Auto Differential; Future; Expected date: 03/26/2025  -     Comprehensive Metabolic Panel; Future; Expected date: 03/26/2025  -     Hemoglobin A1C; Future; Expected date: 03/26/2025  -     TSH; Future; Expected date: 03/26/2025  -     T4, Free; Future; Expected date: 03/26/2025  -     Lipid Panel; Future; Expected date: 03/26/2025    Moderate episode of recurrent major depressive disorder  -     CBC Auto Differential; Future; Expected date: 03/26/2025  -     Comprehensive Metabolic Panel; Future; Expected date: 03/26/2025  -     Hemoglobin A1C; Future; Expected date: 03/26/2025  -     TSH; Future; Expected date: 03/26/2025  -     T4, Free; Future; Expected date: 03/26/2025  -     Lipid Panel; Future; Expected date: 03/26/2025    IFG (impaired fasting glucose)  -     CBC Auto Differential; Future; Expected date: 03/26/2025  -     Comprehensive Metabolic Panel; Future; Expected date: 03/26/2025  -     Hemoglobin A1C; Future; Expected date: 03/26/2025  -     TSH; Future; Expected date: 03/26/2025  -     T4, Free; Future; Expected date: 03/26/2025  -     Lipid Panel; Future; Expected date: 03/26/2025    Gastroesophageal reflux disease without esophagitis  -     CBC Auto Differential; Future; Expected date: 03/26/2025  -     Comprehensive Metabolic Panel; Future; Expected date: 03/26/2025  -     Hemoglobin A1C; Future; Expected date: 03/26/2025  -     TSH; Future; Expected date: 03/26/2025  -     T4, Free; Future; Expected date: 03/26/2025  -     Lipid Panel; Future;  Expected date: 03/26/2025    BMI 28.0-28.9,adult  -     CBC Auto Differential; Future; Expected date: 03/26/2025  -     Comprehensive Metabolic Panel; Future; Expected date: 03/26/2025  -     Hemoglobin A1C; Future; Expected date: 03/26/2025  -     TSH; Future; Expected date: 03/26/2025  -     T4, Free; Future; Expected date: 03/26/2025  -     Lipid Panel; Future; Expected date: 03/26/2025    Overweight (BMI 25.0-29.9)  -     CBC Auto Differential; Future; Expected date: 03/26/2025  -     Comprehensive Metabolic Panel; Future; Expected date: 03/26/2025  -     Hemoglobin A1C; Future; Expected date: 03/26/2025  -     TSH; Future; Expected date: 03/26/2025  -     T4, Free; Future; Expected date: 03/26/2025  -     Lipid Panel; Future; Expected date: 03/26/2025    Other migraine without status migrainosus, not intractable  -     CBC Auto Differential; Future; Expected date: 03/26/2025  -     Comprehensive Metabolic Panel; Future; Expected date: 03/26/2025  -     Hemoglobin A1C; Future; Expected date: 03/26/2025  -     TSH; Future; Expected date: 03/26/2025  -     T4, Free; Future; Expected date: 03/26/2025  -     Lipid Panel; Future; Expected date: 03/26/2025  -     Discontinue: sumatriptan (IMITREX) 50 MG tablet; Take 1 tablet (50 mg total) by mouth every 2 (two) hours as needed for Migraine.  Dispense: 30 tablet; Refill: 0    Encounter for screening mammogram for malignant neoplasm of breast  -     Mammo Digital Screening Bilat w/ Sean (XPD); Future; Expected date: 03/26/2025    Screening for eye condition  -     Ambulatory referral/consult to Optometry; Future; Expected date: 03/26/2025    Chronic pain of both knees  -     CBC Auto Differential; Future; Expected date: 03/26/2025  -     Comprehensive Metabolic Panel; Future; Expected date: 03/26/2025  -     Hemoglobin A1C; Future; Expected date: 03/26/2025  -     TSH; Future; Expected date: 03/26/2025  -     T4, Free; Future; Expected date: 03/26/2025  -     Lipid  Panel; Future; Expected date: 03/26/2025    Constipation, unspecified constipation type  -     CBC Auto Differential; Future; Expected date: 03/26/2025  -     Comprehensive Metabolic Panel; Future; Expected date: 03/26/2025  -     Hemoglobin A1C; Future; Expected date: 03/26/2025  -     TSH; Future; Expected date: 03/26/2025  -     T4, Free; Future; Expected date: 03/26/2025  -     Lipid Panel; Future; Expected date: 03/26/2025    Encounter for lipid screening for cardiovascular disease  -     CBC Auto Differential; Future; Expected date: 03/26/2025  -     Comprehensive Metabolic Panel; Future; Expected date: 03/26/2025  -     Hemoglobin A1C; Future; Expected date: 03/26/2025  -     TSH; Future; Expected date: 03/26/2025  -     T4, Free; Future; Expected date: 03/26/2025  -     Lipid Panel; Future; Expected date: 03/26/2025    Screening for diabetes mellitus  -     CBC Auto Differential; Future; Expected date: 03/26/2025  -     Comprehensive Metabolic Panel; Future; Expected date: 03/26/2025  -     Hemoglobin A1C; Future; Expected date: 03/26/2025  -     TSH; Future; Expected date: 03/26/2025  -     T4, Free; Future; Expected date: 03/26/2025  -     Lipid Panel; Future; Expected date: 03/26/2025          Health Maintenance Due   Topic Date Due    TETANUS VACCINE  Never done    Shingles Vaccine (1 of 2) Never done    Pneumococcal Vaccines (Age 50+) (1 of 1 - PCV) Never done    Mammogram  07/24/2024    COVID-19 Vaccine (1 - 2024-25 season) Never done        I spent 38 minutes on the day of this encounter for preparing for, evaluating, treating, and managing this patient.      -Continue current medications and maintain follow up with specialists.   Follow up in about 1 year (around 3/26/2026), or if symptoms worsen or fail to improve.     This note was generated with the assistance of ambient listening technology. Verbal consent was obtained by the patient and accompanying visitor(s) for the recording of patient  appointment to facilitate this note. I attest to having reviewed and edited the generated note for accuracy, though some syntax or spelling errors may persist. Please contact the author of this note for any clarification.        JULISA Dover  [facility] location                       [1]   Social History  Tobacco Use    Smoking status: Never     Passive exposure: Never    Smokeless tobacco: Never   Substance Use Topics    Alcohol use: Yes     Alcohol/week: 8.0 standard drinks of alcohol     Types: 3 Glasses of wine, 5 Cans of beer per week    Drug use: No   [2]   Outpatient Encounter Medications as of 3/26/2025   Medication Sig Dispense Refill    acetaminophen (TYLENOL EXTRA STRENGTH) 500 MG tablet Take 2 tablets (1,000 mg total) by mouth every 6 (six) hours as needed for Pain. 60 tablet 1    biotin 1,000 mcg Chew       calcium carbonate (OS-FLO) 500 mg calcium (1,250 mg) tablet Take 2 tablets by mouth 2 (two) times daily.        diclofenac sodium (VOLTAREN) 1 % Gel Apply 2 g topically 3 (three) times daily as needed (pain). 100 g 2    fish oil-omega-3 fatty acids 300-1,000 mg capsule Take 2 g by mouth once daily.      levothyroxine (SYNTHROID) 125 MCG tablet Take 1 tablet (125 mcg total) by mouth before breakfast. 30 tablet 11    lysine 1,000 mg Tab       magnesium aspart,citrate,oxide 400 mg magnesium Cap       mecobalamin, vitamin B12, (B12 ACTIVE) 1,000 mcg Chew       meloxicam (MOBIC) 15 MG tablet Take 15 mg by mouth once daily.      propranoloL (INDERAL) 60 MG tablet Take 1 tablet (60 mg total) by mouth 2 (two) times daily. 180 tablet 3    [DISCONTINUED] methocarbamoL (ROBAXIN) 500 MG Tab Take 1 tablet (500 mg total) by mouth 2 (two) times daily as needed (muscle spasms). 30 tablet 1    [DISCONTINUED] gabapentin (NEURONTIN) 600 MG tablet Take 1 tablet (600 mg total) by mouth 3 (three) times daily. 90 tablet 1    [DISCONTINUED] sertraline (ZOLOFT) 100 MG tablet Take 1 tablet (100 mg  total) by mouth once daily. 90 tablet 1    [DISCONTINUED] sumatriptan (IMITREX) 50 MG tablet Take 1 tablet (50 mg total) by mouth every 2 (two) hours as needed for Migraine. 30 tablet 0     No facility-administered encounter medications on file as of 3/26/2025.

## 2025-03-31 ENCOUNTER — RESULTS FOLLOW-UP (OUTPATIENT)
Dept: PRIMARY CARE CLINIC | Facility: CLINIC | Age: 60
End: 2025-03-31

## 2025-04-10 PROBLEM — J06.9 UPPER RESPIRATORY TRACT INFECTION: Status: RESOLVED | Noted: 2023-10-02 | Resolved: 2025-04-10

## 2025-04-10 PROBLEM — F33.1 MODERATE EPISODE OF RECURRENT MAJOR DEPRESSIVE DISORDER: Status: ACTIVE | Noted: 2025-04-10

## 2025-04-10 PROBLEM — H61.21 HEARING LOSS OF RIGHT EAR DUE TO CERUMEN IMPACTION: Status: RESOLVED | Noted: 2023-10-02 | Resolved: 2025-04-10

## 2025-06-05 ENCOUNTER — NURSE TRIAGE (OUTPATIENT)
Dept: ADMINISTRATIVE | Facility: CLINIC | Age: 60
End: 2025-06-05
Payer: COMMERCIAL

## 2025-06-16 ENCOUNTER — TELEPHONE (OUTPATIENT)
Dept: OPTOMETRY | Facility: CLINIC | Age: 60
End: 2025-06-16
Payer: COMMERCIAL

## 2025-07-02 ENCOUNTER — PATIENT MESSAGE (OUTPATIENT)
Dept: PRIMARY CARE CLINIC | Facility: CLINIC | Age: 60
End: 2025-07-02
Payer: COMMERCIAL

## 2025-07-28 ENCOUNTER — OFFICE VISIT (OUTPATIENT)
Dept: ORTHOPEDICS | Facility: CLINIC | Age: 60
End: 2025-07-28
Payer: COMMERCIAL

## 2025-07-28 VITALS — WEIGHT: 162.06 LBS | HEIGHT: 63 IN | BODY MASS INDEX: 28.71 KG/M2

## 2025-07-28 DIAGNOSIS — M25.561 RIGHT KNEE PAIN, UNSPECIFIED CHRONICITY: Primary | ICD-10-CM

## 2025-07-28 DIAGNOSIS — M17.11 PRIMARY OSTEOARTHRITIS OF RIGHT KNEE: Primary | ICD-10-CM

## 2025-07-28 DIAGNOSIS — M17.11 PRIMARY OSTEOARTHRITIS OF RIGHT KNEE: ICD-10-CM

## 2025-07-28 PROCEDURE — 3008F BODY MASS INDEX DOCD: CPT | Mod: CPTII,S$GLB,, | Performed by: SURGERY

## 2025-07-28 PROCEDURE — 99999 PR PBB SHADOW E&M-EST. PATIENT-LVL III: CPT | Mod: PBBFAC,,, | Performed by: SURGERY

## 2025-07-28 PROCEDURE — 1159F MED LIST DOCD IN RCRD: CPT | Mod: CPTII,S$GLB,, | Performed by: SURGERY

## 2025-07-28 PROCEDURE — 3044F HG A1C LEVEL LT 7.0%: CPT | Mod: CPTII,S$GLB,, | Performed by: SURGERY

## 2025-07-28 PROCEDURE — 20610 DRAIN/INJ JOINT/BURSA W/O US: CPT | Mod: RT,S$GLB,, | Performed by: SURGERY

## 2025-07-28 PROCEDURE — 99214 OFFICE O/P EST MOD 30 MIN: CPT | Mod: 25,S$GLB,, | Performed by: SURGERY

## 2025-07-28 RX ORDER — TRIAMCINOLONE ACETONIDE 40 MG/ML
40 INJECTION, SUSPENSION INTRA-ARTICULAR; INTRAMUSCULAR
Status: DISCONTINUED | OUTPATIENT
Start: 2025-07-28 | End: 2025-07-28 | Stop reason: HOSPADM

## 2025-07-28 RX ADMIN — TRIAMCINOLONE ACETONIDE 40 MG: 40 INJECTION, SUSPENSION INTRA-ARTICULAR; INTRAMUSCULAR at 10:07

## 2025-07-28 NOTE — PROGRESS NOTES
"SUBJECTIVE:    Ms. Garcia is here today for a follow up visit.  She has bilateral knee pain.  Having difficulty with squats, going up/down stairs  Has not received any management to date.    Does have a history of tuberculosis for which she received multiple injections, has a small fear of injections.      OBJECTIVE:      Vitals:    07/28/25 1021 07/28/25 1022   Weight: 73.5 kg (162 lb 0.6 oz)    Height: 5' 3" (1.6 m)    PainSc:    8       Lower Extremity Exam  Alert, oriented  Has crepitus on exam  0-130 with pain patellofemoral  Lig stable, NVI     DIAGNOSTIC STUDIES: x-rays of the bilateral knees with mild PF deformity, minimal deformity    ASSESSMENT:   1. Bilateral knee osteoarthritis      PLAN:  There are no diagnoses linked to this encounter.    We discussed operative and non-operative treatments.  Home exercise sheet given - HEP [id number] - exercises given and demonstrated to patient by provider (>15m duration)  Lateral  brace ordered.  Injection performed  Follow up 3 months  Continue home exercises and home over-the-counter medications.      Mike Luo MD  [facility]  Orthopedic Surgery      This note was done with voice recognition software. Please excuse any errors missed in proof reading.       "

## 2025-07-28 NOTE — PROCEDURES
Large Joint Aspiration/Injection: R knee    Date/Time: 7/28/2025 10:15 AM    Performed by: Mike Luo MD  Authorized by: Mike Luo MD      Local anesthesia used?: Yes    Anesthesia:  Local infiltration  Local anesthetic:  Lidocaine 1% without epinephrine    Details:  Needle Size:  21 G  Approach:  Anteromedial  Location:  Knee  Site:  R knee  Medications:  40 mg triamcinolone acetonide 40 mg/mL  Patient tolerance:  Patient tolerated the procedure well with no immediate complications

## 2025-08-15 ENCOUNTER — OFFICE VISIT (OUTPATIENT)
Dept: PRIMARY CARE CLINIC | Facility: CLINIC | Age: 60
End: 2025-08-15
Payer: COMMERCIAL

## 2025-08-15 ENCOUNTER — PATIENT MESSAGE (OUTPATIENT)
Dept: PRIMARY CARE CLINIC | Facility: CLINIC | Age: 60
End: 2025-08-15

## 2025-08-15 ENCOUNTER — LAB VISIT (OUTPATIENT)
Dept: LAB | Facility: HOSPITAL | Age: 60
End: 2025-08-15
Attending: NURSE PRACTITIONER
Payer: COMMERCIAL

## 2025-08-15 VITALS
BODY MASS INDEX: 28.52 KG/M2 | OXYGEN SATURATION: 98 % | SYSTOLIC BLOOD PRESSURE: 118 MMHG | RESPIRATION RATE: 16 BRPM | HEIGHT: 63 IN | DIASTOLIC BLOOD PRESSURE: 74 MMHG | WEIGHT: 160.94 LBS | HEART RATE: 71 BPM

## 2025-08-15 DIAGNOSIS — F33.1 MODERATE EPISODE OF RECURRENT MAJOR DEPRESSIVE DISORDER: ICD-10-CM

## 2025-08-15 DIAGNOSIS — R63.5 WEIGHT GAIN: ICD-10-CM

## 2025-08-15 DIAGNOSIS — Z13.220 ENCOUNTER FOR LIPID SCREENING FOR CARDIOVASCULAR DISEASE: ICD-10-CM

## 2025-08-15 DIAGNOSIS — Z80.3 FAMILY HISTORY OF BREAST CANCER IN MOTHER: ICD-10-CM

## 2025-08-15 DIAGNOSIS — I10 ESSENTIAL HYPERTENSION: ICD-10-CM

## 2025-08-15 DIAGNOSIS — E03.9 ACQUIRED HYPOTHYROIDISM: ICD-10-CM

## 2025-08-15 DIAGNOSIS — Z13.1 SCREENING FOR DIABETES MELLITUS: ICD-10-CM

## 2025-08-15 DIAGNOSIS — Z00.00 ANNUAL PHYSICAL EXAM: ICD-10-CM

## 2025-08-15 DIAGNOSIS — Z13.6 ENCOUNTER FOR LIPID SCREENING FOR CARDIOVASCULAR DISEASE: ICD-10-CM

## 2025-08-15 DIAGNOSIS — Z00.00 ANNUAL PHYSICAL EXAM: Primary | ICD-10-CM

## 2025-08-15 DIAGNOSIS — I10 ESSENTIAL HYPERTENSION: Primary | ICD-10-CM

## 2025-08-15 DIAGNOSIS — Z74.09 IMPAIRED FUNCTIONAL MOBILITY AND ACTIVITY TOLERANCE: ICD-10-CM

## 2025-08-15 DIAGNOSIS — G89.29 CHRONIC PAIN OF RIGHT KNEE: ICD-10-CM

## 2025-08-15 DIAGNOSIS — Z12.31 ENCOUNTER FOR SCREENING MAMMOGRAM FOR MALIGNANT NEOPLASM OF BREAST: ICD-10-CM

## 2025-08-15 DIAGNOSIS — R73.01 IFG (IMPAIRED FASTING GLUCOSE): ICD-10-CM

## 2025-08-15 DIAGNOSIS — M25.561 CHRONIC PAIN OF RIGHT KNEE: ICD-10-CM

## 2025-08-15 DIAGNOSIS — E55.9 VITAMIN D DEFICIENCY: ICD-10-CM

## 2025-08-15 DIAGNOSIS — Z12.11 SCREEN FOR COLON CANCER: ICD-10-CM

## 2025-08-15 LAB
25(OH)D3+25(OH)D2 SERPL-MCNC: 52 NG/ML (ref 30–96)
ABSOLUTE EOSINOPHIL (OHS): 0.18 K/UL
ABSOLUTE MONOCYTE (OHS): 0.42 K/UL (ref 0.3–1)
ABSOLUTE NEUTROPHIL COUNT (OHS): 4.64 K/UL (ref 1.8–7.7)
ALBUMIN SERPL BCP-MCNC: 4.3 G/DL (ref 3.5–5.2)
ALP SERPL-CCNC: 71 UNIT/L (ref 40–150)
ALT SERPL W/O P-5'-P-CCNC: 52 UNIT/L (ref 0–55)
ANION GAP (OHS): 10 MMOL/L (ref 8–16)
AST SERPL-CCNC: 68 UNIT/L (ref 0–50)
BASOPHILS # BLD AUTO: 0.06 K/UL
BASOPHILS NFR BLD AUTO: 0.8 %
BILIRUB SERPL-MCNC: 1 MG/DL (ref 0.1–1)
BUN SERPL-MCNC: 8 MG/DL (ref 6–20)
CALCIUM SERPL-MCNC: 9.8 MG/DL (ref 8.7–10.5)
CHLORIDE SERPL-SCNC: 103 MMOL/L (ref 95–110)
CHOLEST SERPL-MCNC: 278 MG/DL (ref 120–199)
CHOLEST/HDLC SERPL: 4.7 {RATIO} (ref 2–5)
CO2 SERPL-SCNC: 27 MMOL/L (ref 23–29)
CREAT SERPL-MCNC: 0.7 MG/DL (ref 0.5–1.4)
EAG (OHS): 123 MG/DL (ref 68–131)
ERYTHROCYTE [DISTWIDTH] IN BLOOD BY AUTOMATED COUNT: 13.3 % (ref 11.5–14.5)
GFR SERPLBLD CREATININE-BSD FMLA CKD-EPI: >60 ML/MIN/1.73/M2
GLUCOSE SERPL-MCNC: 106 MG/DL (ref 70–110)
HBA1C MFR BLD: 5.9 % (ref 4–5.6)
HCT VFR BLD AUTO: 43.3 % (ref 37–48.5)
HDLC SERPL-MCNC: 59 MG/DL (ref 40–75)
HDLC SERPL: 21.2 % (ref 20–50)
HGB BLD-MCNC: 14.4 GM/DL (ref 12–16)
IMM GRANULOCYTES # BLD AUTO: 0.02 K/UL (ref 0–0.04)
IMM GRANULOCYTES NFR BLD AUTO: 0.3 % (ref 0–0.5)
LDLC SERPL CALC-MCNC: 179 MG/DL (ref 63–159)
LYMPHOCYTES # BLD AUTO: 2.36 K/UL (ref 1–4.8)
MCH RBC QN AUTO: 29.6 PG (ref 27–31)
MCHC RBC AUTO-ENTMCNC: 33.3 G/DL (ref 32–36)
MCV RBC AUTO: 89 FL (ref 82–98)
NONHDLC SERPL-MCNC: 219 MG/DL
NUCLEATED RBC (/100WBC) (OHS): 0 /100 WBC
PLATELET # BLD AUTO: 304 K/UL (ref 150–450)
PMV BLD AUTO: 10.7 FL (ref 9.2–12.9)
POTASSIUM SERPL-SCNC: 4.6 MMOL/L (ref 3.5–5.1)
PROT SERPL-MCNC: 8.4 GM/DL (ref 6–8.4)
RBC # BLD AUTO: 4.87 M/UL (ref 4–5.4)
RELATIVE EOSINOPHIL (OHS): 2.3 %
RELATIVE LYMPHOCYTE (OHS): 30.7 % (ref 18–48)
RELATIVE MONOCYTE (OHS): 5.5 % (ref 4–15)
RELATIVE NEUTROPHIL (OHS): 60.4 % (ref 38–73)
SODIUM SERPL-SCNC: 140 MMOL/L (ref 136–145)
T4 FREE SERPL-MCNC: 1.19 NG/DL (ref 0.71–1.51)
TRIGL SERPL-MCNC: 200 MG/DL (ref 30–150)
TSH SERPL-ACNC: 4.66 UIU/ML (ref 0.4–4)
WBC # BLD AUTO: 7.68 K/UL (ref 3.9–12.7)

## 2025-08-15 PROCEDURE — 85025 COMPLETE CBC W/AUTO DIFF WBC: CPT

## 2025-08-15 PROCEDURE — 3078F DIAST BP <80 MM HG: CPT | Mod: CPTII,S$GLB,, | Performed by: NURSE PRACTITIONER

## 2025-08-15 PROCEDURE — 3008F BODY MASS INDEX DOCD: CPT | Mod: CPTII,S$GLB,, | Performed by: NURSE PRACTITIONER

## 2025-08-15 PROCEDURE — 99999 PR PBB SHADOW E&M-EST. PATIENT-LVL IV: CPT | Mod: PBBFAC,,, | Performed by: NURSE PRACTITIONER

## 2025-08-15 PROCEDURE — 1160F RVW MEDS BY RX/DR IN RCRD: CPT | Mod: CPTII,S$GLB,, | Performed by: NURSE PRACTITIONER

## 2025-08-15 PROCEDURE — 36415 COLL VENOUS BLD VENIPUNCTURE: CPT

## 2025-08-15 PROCEDURE — 84443 ASSAY THYROID STIM HORMONE: CPT

## 2025-08-15 PROCEDURE — 3074F SYST BP LT 130 MM HG: CPT | Mod: CPTII,S$GLB,, | Performed by: NURSE PRACTITIONER

## 2025-08-15 PROCEDURE — 80053 COMPREHEN METABOLIC PANEL: CPT

## 2025-08-15 PROCEDURE — 80061 LIPID PANEL: CPT

## 2025-08-15 PROCEDURE — 84439 ASSAY OF FREE THYROXINE: CPT

## 2025-08-15 PROCEDURE — 83036 HEMOGLOBIN GLYCOSYLATED A1C: CPT

## 2025-08-15 PROCEDURE — 1159F MED LIST DOCD IN RCRD: CPT | Mod: CPTII,S$GLB,, | Performed by: NURSE PRACTITIONER

## 2025-08-15 PROCEDURE — 3044F HG A1C LEVEL LT 7.0%: CPT | Mod: CPTII,S$GLB,, | Performed by: NURSE PRACTITIONER

## 2025-08-15 PROCEDURE — 99396 PREV VISIT EST AGE 40-64: CPT | Mod: S$GLB,,, | Performed by: NURSE PRACTITIONER

## 2025-08-15 PROCEDURE — 82306 VITAMIN D 25 HYDROXY: CPT

## 2025-08-16 ENCOUNTER — PATIENT MESSAGE (OUTPATIENT)
Dept: PRIMARY CARE CLINIC | Facility: CLINIC | Age: 60
End: 2025-08-16
Payer: COMMERCIAL

## 2025-08-18 ENCOUNTER — PATIENT MESSAGE (OUTPATIENT)
Dept: PRIMARY CARE CLINIC | Facility: CLINIC | Age: 60
End: 2025-08-18
Payer: COMMERCIAL

## 2025-08-18 DIAGNOSIS — I10 ESSENTIAL HYPERTENSION: ICD-10-CM

## 2025-08-18 DIAGNOSIS — R63.5 WEIGHT GAIN: ICD-10-CM

## 2025-08-18 DIAGNOSIS — R73.01 IFG (IMPAIRED FASTING GLUCOSE): ICD-10-CM

## 2025-08-18 DIAGNOSIS — R73.03 PREDIABETES: Primary | ICD-10-CM

## 2025-08-18 DIAGNOSIS — E03.9 ACQUIRED HYPOTHYROIDISM: ICD-10-CM

## 2025-08-19 ENCOUNTER — PATIENT MESSAGE (OUTPATIENT)
Dept: PRIMARY CARE CLINIC | Facility: CLINIC | Age: 60
End: 2025-08-19
Payer: COMMERCIAL

## 2025-08-19 DIAGNOSIS — R73.03 PREDIABETES: Primary | ICD-10-CM

## 2025-08-19 DIAGNOSIS — R73.01 IFG (IMPAIRED FASTING GLUCOSE): ICD-10-CM

## 2025-08-19 RX ORDER — SEMAGLUTIDE 0.25 MG/.5ML
0.25 INJECTION, SOLUTION SUBCUTANEOUS
Qty: 2 ML | Refills: 0 | Status: SHIPPED | OUTPATIENT
Start: 2025-08-19 | End: 2025-08-19 | Stop reason: SDUPTHER

## 2025-08-19 RX ORDER — SEMAGLUTIDE 0.25 MG/.5ML
0.25 INJECTION, SOLUTION SUBCUTANEOUS
Qty: 2 ML | Refills: 0 | Status: SHIPPED | OUTPATIENT
Start: 2025-08-19

## 2025-08-19 RX ORDER — METFORMIN HYDROCHLORIDE 500 MG/1
500 TABLET, EXTENDED RELEASE ORAL
Qty: 90 TABLET | Refills: 3 | Status: SHIPPED | OUTPATIENT
Start: 2025-08-19 | End: 2026-08-19

## 2025-09-05 DIAGNOSIS — E03.9 ACQUIRED HYPOTHYROIDISM: ICD-10-CM

## 2025-09-05 RX ORDER — LEVOTHYROXINE SODIUM 125 UG/1
125 TABLET ORAL
Qty: 30 TABLET | Refills: 11 | Status: SHIPPED | OUTPATIENT
Start: 2025-09-05